# Patient Record
Sex: FEMALE | Race: WHITE | NOT HISPANIC OR LATINO | Employment: UNEMPLOYED | URBAN - NONMETROPOLITAN AREA
[De-identification: names, ages, dates, MRNs, and addresses within clinical notes are randomized per-mention and may not be internally consistent; named-entity substitution may affect disease eponyms.]

---

## 2018-01-09 NOTE — PROGRESS NOTES
NOV 10 2016         RE: Jo Beard                                 To: [facility]   MR#: [id number]                                   [address]   : [address] [address], [address] [place] [address]    ENC: [id number]                             Fax: [phone]   (Exam #: [id number])      The LMP of this 25year old,  G1, P0-0-0-0 patient was unknown, her   working MARII is DEC 7 2016 and the current gestational age is 42 weeks 1   day by [address] [place] [address]  A sonographic examination was performed on NOV   10 2016 using real time equipment  The ultrasound examination was   performed using abdominal technique  The patient has a BMI of 27 5  Her   blood pressure today was 135/78  MFM ultrasound 16   21w1d   16 MARII      Cardiac motion was observed at 134 bpm       INDICATIONS      asthma      Exam Types      Level I      RESULTS      Fetus # 1 of 1   Vertex presentation   Fetal growth appeared normal   Placenta Location = Posterior   No placenta previa   Placenta Grade = I      MEASUREMENTS (* Included In Average GA)      AC              33 8 cm        37 weeks 6 days* (83%)   BPD              8 7 cm        35 weeks 0 days* (33%)   HC              32 0 cm        35 weeks 4 days* (31%)   Femur            7 2 cm        36 weeks 1 day * (62%)      Cerebellum       4 7 cm        36 weeks 1 day   CisternaMagna    7 9 mm      HC/AC           0 95   FL/AC           0 21   FL/BPD          0 83   EFW (Ac/Fl/Hc)  3094 grams - 6 lbs 13 oz                 (65%)      THE AVERAGE GESTATIONAL AGE is 36 weeks 1 day +/- 21 days  AMNIOTIC FLUID      Q1: 2 8      Q2: 3 4      Q3: 4 9      Q4: 5 1   MAGGIE Total = 16 3 cm   Amniotic Fluid: Normal      ANATOMY DETAILS      Visualized Appearing Sonographically Normal:   HEAD: (Calvarium, BPD Level, Cavum, Lateral Ventricles, Cerebellum,   Cisterna Magna);    TH  CAV  : (Lungs, Diaphragm); HEART: (Four Chamber   View, Proximal Right Outflow, Cardiac Axis, Cardiac Position);    ABD    CAV : (Liver);    STOMACH, RIGHT KIDNEY, LEFT KIDNEY, BLADDER, PLACENTA      Not Visualized:   HEART: (Proximal Left Outflow)      IMPRESSION      Toussaint IUP   36 weeks and 1 day by this ultrasound  (MARII=DEC 7 2016)   36 weeks and 1 day by 1st Tri Sono  (MARII=DEC 7 2016)   Vertex presentation   Fetal growth appeared normal   Regular fetal heart rate of 134 bpm   Posterior placenta   No placenta previa      GENERAL COMMENT      On exam today the patient appears well, in no acute distress, and denies   any complaints  Her abdomen is non-tender  There has been appropriate interval fetal growth  Good fetal movement and   tone are seen  The amniotic fluid volume appears normal   The placenta is   posterior and it appears sonographically normal   The anatomic structures   listed above could not be optimally imaged today because of fetal   position; however, these structures were seen on a prior ultrasound and   appeared sonographically normal   The patient was informed of today's   findings and all of her questions were answered  The limitations of   ultrasound were reviewed with the patient   labor precautions as   well as fetal kick counts were reviewed  Recommend further ultrasounds as clinically indicated  Thank you very much for allowing us to participate in the care of this   very nice patient  Should you have any questions, please do not hesitate   to contact our office  HALI Mcdaniel M D     Electronically signed 11/10/16 14:08

## 2018-01-11 NOTE — PROGRESS NOTES
OCT 11 2016         RE: Preet Marin                                 To: [facility] [address]  [facility]   MR#: [id number]                                   [address]   : [address] [facility], [address] [place] [address]    ENC: [id number]                             Fax: [phone]   (Exam #: [id number])      The LMP of this 27year old,  G1, P0-0-0-0 patient was unknown, her   working MARII is DEC 7 2016 and the current gestational age is 34 weeks 6   days by [address] [place] [address]  A sonographic examination was performed on OCT   11 2016 using real time equipment  The ultrasound examination was   performed using abdominal technique  The patient has a BMI of 26 7  Her   blood pressure today was 105/70  MFM ultrasound 16   21w1d   16 MARII      Cardiac motion was observed at 139 bpm       INDICATIONS      asthma      Exam Types      Level I      RESULTS      Fetus # 1 of 1   Vertex presentation   Fetal growth appeared normal   Placenta Location = Posterior   No placenta previa   Placenta Grade = I      MEASUREMENTS (* Included In Average GA)      AC              28 1 cm        32 weeks 1 day * (52%)   BPD              7 7 cm        30 weeks 6 days* (21%)   HC              28 3 cm        30 weeks 5 days* (12%)   Femur            6 3 cm        32 weeks 3 days* (58%)      Cerebellum       3 7 cm        32 weeks 1 day   CisternaMagna    6 1 mm      HC/AC           1 01   FL/AC           0 22   FL/BPD          0 82   EFW (Ac/Fl/Hc)  1896 grams - 4 lbs 3 oz                 (46%)      THE AVERAGE GESTATIONAL AGE is 31 weeks 4 days +/- 18 days        AMNIOTIC FLUID      Q1: 3 4      Q2: 3 4      Q3: 2 4      Q4: 4 1   MAGGIE Total = 13 2 cm   Amniotic Fluid: Normal      ANATOMY DETAILS      Visualized Appearing Sonographically Normal:   HEAD: (Calvarium, BPD Level, Cavum, Lateral Ventricles, Choroid Plexus,   Cerebellum, Cisterna Magna);    TH  CAV : (Diaphragm); HEART: (Four   eXelate, Proximal Right Outflow, 3 Vessel Trachea, Short Axis of   Greater Vessels, Ductal Arch, Aortic Arch, Interventricular Septum,   Interatrial Septum, Cardiac Axis, Cardiac Position);    ABD  CAV ,   STOMACH, RIGHT KIDNEY, LEFT KIDNEY, BLADDER, PLACENTA      Not Visualized:   HEART: (Proximal Left Outflow)      BIOPHYSICAL PROFILE      The Biophysical Profile score was 8/8  Breathin  Movement: 2  Tone: 2  AFV: 2      IMPRESSION      Toussaint IUP   31 weeks and 4 days by this ultrasound  (MARII=DEC 9 2016)   31 weeks and 6 days by 1st Tri Sono  (MARII=DEC 7 2016)   Vertex presentation   Fetal growth appeared normal   Regular fetal heart rate of 139 bpm   Posterior placenta   No placenta previa      GENERAL COMMENT      Office Visit      On exam today the patient appears well, in no acute distress, and denies   any complaints other than occasional asthma exacerbation  Her abdomen is   non-tender  There has been appropriate interval fetal growth  Good fetal movement and   tone are seen  The amniotic fluid volume appears normal   The placenta is   posterior and it appears sonographically normal   The anatomic structures   listed above could not be optimally imaged today because of fetal   position; however, these structures were seen on a prior ultrasound and   appeared sonographically normal   The patient was informed of today's   findings and all of her questions were answered  The limitations of   ultrasound were reviewed with the patient   labor precautions and   fetal kick counts were reviewed  Provide the patient with a new requisition for a Ventolin inhaler given   that she stated she has not had one but feels the need to utilize it   recently for some wheezing  I provided her instructions upon its use and   asked her to call her family doctor if she feels like she needs to use it   more than twice a week        Recommend the patient return as clinically indicated  Thank you very much for allowing us to participate in the care of this   very nice patient  Should you have any questions, please do not hesitate   to contact our office  Please note, in addition to the time spent discussing the results of the   ultrasound, I spent approximately 10 minutes of face-to-face time with the   patient, greater than 50% of which was spent in counseling and the   coordination of care for this patient  HALI Angela M D     Electronically signed 10/11/16 13:55

## 2018-01-12 NOTE — MISCELLANEOUS
Message  Return to work or school:   [facility]  She was seen in my office on 11/16/2016       Due to high risk with patient blood pressure she is to sit and takes breaks frequently  Signatures   Electronically signed by :  Carolyn Villarreal, ; Nov 16 2016  1:51PM EST                       (Author)

## 2018-01-13 NOTE — RESULT NOTES
Verified Results  (1) URINE CULTURE [id number] 12:00AM Tobytonja Solano     Test Name Result Flag Reference   CULTURE, URINE, ROUTINE  A    CULTURE, URINE, ROUTINE         MICRO NUMBER:      [id number]    TEST STATUS:       FINAL    SPECIMEN SOURCE:   URINE    SPECIMEN QUALITY:  ADEQUATE    RESULT:            Greater than 100,000 CFU/mL of Escherichia coli                            E coli                            ----------------                            INT   MICHELLE     AMOX/CLAVULANATE       R     >=32 0     AMPICILLIN             R     >=32 0     AMP/SULBACTAM          R     >=32 0     CEFAZOLIN              R     >=64 0 **1     CEFEPIME               S     <=1 0     CEFTRIAXONE            S     <=1 0     CIPROFLOXACIN          S     <=0 25     ERTAPENEM              S     <=0 5     GENTAMICIN             S     <=1 0     IMIPENEM               S     <=0 25     LEVOFLOXACIN           S     <=0 12     NITROFURANTOIN         S     <=16 0     PIP/TAZOBACTAM         S     8 0     TOBRAMYCIN             S     <=1 0     TRIMETHOPRIM/SULFA     S     <=20 0  S=Susceptible  I=Intermediate  R=Resistant  * = Not Tested  NR = Not Reported  **NN = See Therapy Comments  THERAPY COMMENTS      Note 1:      ORAL therapy: A cefazolin MICHELLE of < 32 predicts      susceptibility to the oral agents cefaclor,      cefdinir, cefpodoxime, cefprozil, cefuroxime,      cephalexin, and loracarbef when used for therapy      of uncomplicated UTIs due to E  coli,      K  pneumoniae, and P  mirabilis  PARENTERAL therapy: A cefazolin MICHELLE of > 8      indicates resistance to parenteral cefazolin  An alternate test method must be performed to      to confirm susceptibility to parenteral cefazolin

## 2018-01-14 NOTE — MISCELLANEOUS
Reason For Visit  Reason For Visit Free Text Note Form: Received request to contact pt  regarding elevated depression score  Call to pt  who reports she is feeling more anxious than depressed  She denies SI at this time  Pt  reports she is interested in outpatient counseling however her Medical Assistance is pending and therefore she is self-pay for services at this time  / [facility]  office as option for free mental health services and she is agreeable to consider  Supportive counseling provided to pt  Letter mailed to pt  today including contact information for [facility] as well as contact information for  at [facility]  Pt  denies further needs at this time  Will continue to be available to provide support  Active Problems    1  Anxiety (300 00) (F41 9)   2  Normal pregnancy (V22 2) (Z34 90)   3  Screen for STD (sexually transmitted disease) (V74 5) (Z11 3)   4  UTI in pregnancy (646 60,599 0) (O23 40)    Current Meds   1  Prenatal Vitamins 0 8 MG Oral Tablet; TAKE 1 TABLET DAILY; Therapy: [id number] to (Evaluate:93Eqj0883)  Requested for: 92QFG8682; Last   Rx:[id number] Ordered    Allergies    1  No Known Drug Allergies    2   Seasonal    Signatures   Electronically signed by : BEBE Betancourt; Jul 28 2016 11:00AM EST                       (Author)

## 2018-01-16 NOTE — PROGRESS NOTES
2016         RE: Sukumar Rebolledo                                 To: FLASH MATTA [facility]   MR#: [id number]                                   [address] [place] [address]   : [place] [facility]  ENC: [id number]                             Debbie [address]   (Exam #: [id number])                           Fax: [phone]      The LMP of this 27year old,  G1, P0-0-0-0 patient was unknown, her   working MARII is DEC 7 2016 and the current gestational age is 22 weeks 1   day by [address] [place] [address]  A sonographic examination was performed on 2016 using real time equipment  The ultrasound examination was   performed using abdominal & vaginal techniques  The patient has a BMI of   24 6  Her blood pressure today was 112/61  MFM ultrasound 16   21w1d   16 MARII Thank you very much for   referring this very nice patient for a  consultation and fetal   anatomic survey  This is the patient's first pregnancy  She has a   history of asthma for which she takes albuterol although she no longer has   this medication in her possession  She has a history of anxiety not   requiring any medications  She denies the current use of tobacco,   alcohol, or drugs  Her medications include prenatal vitamins and   albuterol when necessary  The patient's family medical history is   noncontributory  The father the baby states that he has a brother and   sister both of whom have developmental delay although they do not know if   it is geneticly related  A review of systems is otherwise negative  On   exam, the patient appears well, in no acute distress, and her abdomen is   nontender  The patient's lung exam shows that there is no wheezing or   rhonchi appreciated  Her lungs are clear to auscultation bilaterally        Cardiac motion was observed at 148 bpm       INDICATIONS      fetal anatomical survey      Exam Types      LEVEL II   Transvaginal      RESULTS      Fetus # 1 of 1   Vertex presentation   Fetal growth appeared normal   Placenta Location = Posterior   No placenta previa   Placenta Grade = I      MEASUREMENTS (* Included In Average GA)      AC              16 2 cm        21 weeks 0 days* (50%)   BPD              4 9 cm        20 weeks 5 days* (40%)   HC              18 3 cm        20 weeks 4 days* (37%)   Femur            3 8 cm        22 weeks 2 days* (71%)      Nuchal Fold      3 0 mm      Humerus          3 3 cm        21 weeks 1 day   (48%)   Radius           2 6 cm        20 weeks 6 days   Ulna             2 9 cm        20 weeks 6 days   Tibia            2 8 cm        20 weeks 0 days  (13%)   Fibula           2 9 cm        19 weeks 5 days   Foot             3 3 cm        20 weeks 1 day      Cerebellum       2 3 cm        21 weeks 6 days   Biorbit          3 2 cm        20 weeks 4 days   CisternaMagna    8 3 mm      HC/AC           1 13   FL/AC           0 23   FL/BPD          0 78   EFW (Ac/Fl/Hc)   425 grams - 0 lbs 15 oz      THE AVERAGE GESTATIONAL AGE is 21 weeks 1 day +/- 10 days  AMNIOTIC FLUID         Largest Vertical Pocket = 5 8 cm   Amniotic Fluid: Normal      UTERINE ARTERIES                                  S/D   PI    RI    NOTCH       Left Uterine Artery        1 46  0 40  0 31       Right Uterine Artery       2 12  0 84  0 53      CERVICAL EVALUATION      The cervix appeared normal (Ultrasound Examination)  SUPINE      Cervical Length: 4 30 cm      OTHER TEST RESULTS           Funneling?: No             Dynamic Changes?: No        Resp  To TFP?: No      ANATOMY      Head                                    Normal   Face/Neck                               Normal   Th  Cav  Normal   Heart                                   Normal   Abd  Cav                                 Normal   Stomach                                 Normal   Right Kidney Normal   Left Kidney                             Normal   Bladder                                 Normal   Abd  Wall                               Normal   Spine                                   Normal   Extrems                                 Normal   Genitalia                               Normal   Placenta                                Normal   Umbl  Cord                              Normal   Uterus                                  Normal   PCI                                     Normal      ANATOMY DETAILS      Visualized Appearing Sonographically Normal:   HEAD: (Calvarium, BPD Level, Cavum, Lateral Ventricles, Choroid Plexus,   Cerebellum, Cisterna Magna);    FACE/NECK: (Neck, Nuchal Fold, Profile,   Orbits, Nose/Lips, Palate, Face);    TH  CAV : (Diaphragm); HEART:   (Four Chamber View, Proximal Left Outflow, Proximal Right Outflow, 3   Vessel Trachea, Short Axis of Greater Vessels, Ductal Arch, Aortic Arch,   Interventricular Septum, Interatrial Septum, Cardiac Axis, Cardiac   Position);    ABD  CAV , STOMACH, RIGHT KIDNEY, LEFT KIDNEY, BLADDER, ABD  WALL, SPINE: (Cervical Spine, Thoracic Spine, Lumbar Spine, Sacrum);      EXTREMS: (Lt Humerus, Rt Humerus, Lt Forearm, Rt Forearm, Lt Hand, Rt   Hand, Lt Femur, Rt Femur, Lt Low Leg, Rt Low Leg, Lt Foot, Rt Foot);      GENITALIA (Female), PLACENTA, UMBL  CORD, UTERUS, PCI      ADNEXA      The left ovary appeared normal and measured 2 2 x 1 5 x 1 3 cm with a   volume of 2 2 cc  The right ovary appeared normal and measured 2 5 x 1 9 x   1 6 cm with a volume of 4 0 cc        IMPRESSION      Toussaint IUP   21 weeks and 1 day by this ultrasound  (MARII=DEC 7 2016)   21 weeks and 1 day by [facility] Tri Sono  (MARII=DEC 7 2016)   Vertex presentation   Fetal growth appeared normal   Normal anatomy survey   Regular fetal heart rate of 148 bpm   Posterior placenta   No placenta previa      GENERAL COMMENT      The patient has declined genetic screening, and we discussed that a normal   ultrasound does not exclude all congenital birth defects or karyotypic   abnormalities  The fetal anatomic survey is complete  There is no sonographic evidence   of fetal abnormalities at this time  Good fetal movement and tone are   seen  The amniotic fluid volume appears normal   The placenta is   posterior and it appears sonographically normal  No notching of either   uterine artery waveform is appreciated and resistive indices are normal     A transvaginal ultrasound was performed to assess the cervix, which was   not seen well transabdominally  The cervical length was 4 3 centimeters,   which is normal for the current gestational age  There was no significant   funneling or dynamic changes appreciated  The patient was informed of   today's findings and all of her questions were answered  The limitations   of ultrasound were reviewed with the patient, which she accepts  We discussed that asthma puts her at increased risk for adverse pregnancy   outcome and adverse maternal outcome secondary to the physiologic changes   of pregnancy and that is very important to control asthma well during   pregnancy as it is outside of pregnancy  In general, asthma is managed   similarly during pregnancy as it is outside of pregnancy with a rescue   inhaler (beta 2 agonist) as needed and inhaled corticosteroids or inhaled   long-acting beta-2 agonists for severe persistent asthma  I recommend   monitoring her asthma through the use of a peak flow monitor to obtain   objective evidence of adequate control  I provided the patient with a   prescription for albuterol and instructions on its use    She was   instructed that if she requires its use more than twice a week, she should   see her primary care doctor or return for evaluation for an inhaled   corticosteroids      Please note, in addition to the time spent discussing the results of the   ultrasound, I spent approximately 15 minutes of face-to-face time with the   patient, greater than 50% of which was spent in counseling and the   coordination of care for this patient  Thank you very much for allowing us to participate in the care of this   very nice patient  Should you have any questions, please do not hesitate   to contact our office  HALI Cortes , SKY Edwards     Electronically signed 07/28/16 16:21

## 2018-01-18 NOTE — RESULT NOTES
Verified Results  (1) PRENATAL PANEL [id number] 11:19AM Kirstie Backers Order Number: [id number]     Test Name Result Flag Reference   HIV 1/2 AND P24 Non-Reactive  Non-Reactive   This test detects HIV 1, HIV2 and p24 Antigen     BILIRUBIN UA Negative  Negative   CLARITY Hazy     COLOR Yellow     KETONES UA Negative mg/dl  Negative   LEUKOCYTE ESTERASE UA Small A Negative   NITRITE UA Positive A Negative   BLOOD UA Negative  Negative, Trace-Intact   PH UA 6 5  4 5-8 0   PROTEIN UA Negative mg/dl  Negative   SPECIFIC GRAVITY UA 1 025  1 003-1 030   GLUCOSE UA Negative mg/dl  Negative   UROBILINOGEN UA 0 2 E U /dl  0 2, 1 0 E U /dl   HEPATITIS B SURFACE ANTIGEN Non-reactive  Non-reactive, NonReactive - Confirmed   BASOPHILS ABSOLUTE COUNT 0 01 Thousands/?L  0 00-0 10   EOSINOPHILS ABSOLUTE COUNT 0 07 Thousand/?L  0 00-0 61   LYMPHOCYTES ABSOLUTE COUNT 1 61 Thousands/?L  0 60-4 47   MONOCYTES ABSOLUTE COUNT 0 49 Thousand/?L  0 17-1 22   NEUTROPHILS ABSOLUTE COUNT 4 89 Thousands/?L  1 85-7 62   BASOPHILS RELATIVE PERCENT 0 %  0-1   EOSINOPHILS RELATIVE PERCENT 1 %  0-6   HEMATOCRIT 32 5 % L 34 8-46 1   HEMOGLOBIN 11 0 g/dL L 11 5-15 4   LYMPHOCYTES RELATIVE PERCENT 23 %  14-44   MCH 29 6 pg  26 8-34 3   MCHC 33 8 g/dL  31 4-37 4   MCV 87 fL  82-98   MONOCYTES RELATIVE PERCENT 7 %  4-12   nRBC AUTOMATED 0 /100 WBCs     PLATELET COUNT 670 Thousands/uL  149-390   MPV 10 0 fL  8 9-12 7   RBC COUNT 3 72 Million/uL L 3 81-5 12   RDW 13 5 %  11 6-15 1   NEUTROPHILS RELATIVE PERCENT 69 %  43-75   WBC COUNT 7 07 Thousand/uL  4 31-10 16   RUBELLA (IGG) 83 6 IU/mL  >9 9   Rubella IgG       <5 0 IU/mL     Negative: not immune      5 0-9 9 IU/mL  Equivocal     >9 9 IU/mL     Positive: immune   ABO GROUPING O     RH FACTOR Positive     ANTIBODY SCREEN Negative     RPR Non-Reactive  Non-Reactive   CLINICAL REPORT (Report)     Test:        Urine culture  Specimen Type:   Urine  Specimen Date:   7/15/2016 11:19 AM  Result Date:    7/17/2016 9:15 AM  Result Status:   Final result  Resulting Lab:   [facility] [address] [address]            Tel: [phone]      CULTURE                                       ------------------                                   >100,000 cfu/ml Escherichia coli      SUSCEPTIBILITY                                   ------------------                                                       Escherichia coli  METHOD                 MICHELLE  -------------------------------------  -------------------------  AMPICILLIN ($$)             <=8 00 ug/ml Susceptible  AZTREONAM ($$$)             <=8 ug/ml   Susceptible  CEFAZOLIN ($)              <=8 00 ug/ml Susceptible  CIPROFLOXACIN ($)            <=1 00 ug/ml Susceptible  GENTAMICIN ($$)             <=4 ug/ml   Susceptible  LEVOFLOXACIN ($)            <=2 00 ug/ml Susceptible  NITROFURANTOIN             <=32 ug/ml  Susceptible  PIPERACILLIN + TAZOBACTAM ($$$)     <=16 ug/ml  Susceptible  TETRACYCLINE              <=4 ug/ml   Susceptible  TOBRAMYCIN ($)             <=4 ug/ml   Susceptible  TRIMETHOPRIM + SULFAMETHOXAZOLE ($$$)  <=2/38 ug/ml Susceptible

## 2019-01-20 ENCOUNTER — HOSPITAL ENCOUNTER (EMERGENCY)
Facility: HOSPITAL | Age: 24
Discharge: HOME | End: 2019-01-20
Attending: EMERGENCY MEDICINE

## 2019-01-20 VITALS
RESPIRATION RATE: 21 BRPM | HEART RATE: 93 BPM | DIASTOLIC BLOOD PRESSURE: 67 MMHG | TEMPERATURE: 98.5 F | WEIGHT: 141.98 LBS | OXYGEN SATURATION: 97 % | SYSTOLIC BLOOD PRESSURE: 117 MMHG | BODY MASS INDEX: 25.97 KG/M2

## 2019-01-20 DIAGNOSIS — Z34.91 FIRST TRIMESTER PREGNANCY: ICD-10-CM

## 2019-01-20 DIAGNOSIS — O23.41 UTI (URINARY TRACT INFECTION) DURING PREGNANCY, FIRST TRIMESTER: ICD-10-CM

## 2019-01-20 DIAGNOSIS — R42 ORTHOSTATIC LIGHTHEADEDNESS: ICD-10-CM

## 2019-01-20 DIAGNOSIS — R55 SYNCOPE: Primary | ICD-10-CM

## 2019-01-20 DIAGNOSIS — E87.6 ACUTE HYPOKALEMIA: ICD-10-CM

## 2019-01-20 DIAGNOSIS — F12.10 CANNABIS ABUSE: ICD-10-CM

## 2019-01-20 LAB
AMPHETAMINES SERPL QL SCN: NEGATIVE
ANION GAP SERPL CALCULATED.3IONS-SCNC: 10 MMOL/L (ref 4–13)
BACTERIA UR QL AUTO: ABNORMAL /HPF
BARBITURATES UR QL: NEGATIVE
BASOPHILS # BLD AUTO: 0.01 THOUSANDS/ΜL (ref 0–0.1)
BASOPHILS NFR BLD AUTO: 0 % (ref 0–1)
BENZODIAZ UR QL: NEGATIVE
BILIRUB UR QL STRIP: NEGATIVE
BUN SERPL-MCNC: 7 MG/DL (ref 5–25)
CALCIUM SERPL-MCNC: 8.5 MG/DL (ref 8.3–10.1)
CHLORIDE SERPL-SCNC: 104 MMOL/L (ref 100–108)
CLARITY UR: CLEAR
CO2 SERPL-SCNC: 25 MMOL/L (ref 21–32)
COCAINE UR QL: NEGATIVE
COLOR UR: YELLOW
CREAT SERPL-MCNC: 0.7 MG/DL (ref 0.6–1.3)
EOSINOPHIL # BLD AUTO: 0.07 THOUSAND/ΜL (ref 0–0.61)
EOSINOPHIL NFR BLD AUTO: 1 % (ref 0–6)
ERYTHROCYTE [DISTWIDTH] IN BLOOD BY AUTOMATED COUNT: 13.7 % (ref 11.6–15.1)
EXT PREG TEST URINE: POSITIVE
GFR SERPL CREATININE-BSD FRML MDRD: 124 ML/MIN/1.73SQ M
GLUCOSE SERPL-MCNC: 124 MG/DL (ref 65–140)
GLUCOSE UR STRIP-MCNC: NEGATIVE MG/DL
HCT VFR BLD AUTO: 34.8 % (ref 34.8–46.1)
HGB BLD-MCNC: 11.4 G/DL (ref 11.5–15.4)
HGB UR QL STRIP.AUTO: NEGATIVE
IMM GRANULOCYTES # BLD AUTO: 0.03 THOUSAND/UL (ref 0–0.2)
IMM GRANULOCYTES NFR BLD AUTO: 0 % (ref 0–2)
KETONES UR STRIP-MCNC: NEGATIVE MG/DL
LEUKOCYTE ESTERASE UR QL STRIP: ABNORMAL
LYMPHOCYTES # BLD AUTO: 1.94 THOUSANDS/ΜL (ref 0.6–4.47)
LYMPHOCYTES NFR BLD AUTO: 25 % (ref 14–44)
MCH RBC QN AUTO: 28.7 PG (ref 26.8–34.3)
MCHC RBC AUTO-ENTMCNC: 32.8 G/DL (ref 31.4–37.4)
MCV RBC AUTO: 88 FL (ref 82–98)
METHADONE UR QL: NEGATIVE
MONOCYTES # BLD AUTO: 0.5 THOUSAND/ΜL (ref 0.17–1.22)
MONOCYTES NFR BLD AUTO: 7 % (ref 4–12)
MUCOUS THREADS UR QL AUTO: ABNORMAL
NEUTROPHILS # BLD AUTO: 5.11 THOUSANDS/ΜL (ref 1.85–7.62)
NEUTS SEG NFR BLD AUTO: 67 % (ref 43–75)
NITRITE UR QL STRIP: NEGATIVE
NON-SQ EPI CELLS URNS QL MICRO: ABNORMAL /HPF
NRBC BLD AUTO-RTO: 0 /100 WBCS
OPIATES UR QL SCN: NEGATIVE
PCP UR QL: NEGATIVE
PH UR STRIP.AUTO: 6 [PH] (ref 4.5–8)
PLATELET # BLD AUTO: 253 THOUSANDS/UL (ref 149–390)
PMV BLD AUTO: 10.5 FL (ref 8.9–12.7)
POTASSIUM SERPL-SCNC: 3.3 MMOL/L (ref 3.5–5.3)
PROT UR STRIP-MCNC: NEGATIVE MG/DL
RBC # BLD AUTO: 3.97 MILLION/UL (ref 3.81–5.12)
RBC #/AREA URNS AUTO: ABNORMAL /HPF
SODIUM SERPL-SCNC: 139 MMOL/L (ref 136–145)
SP GR UR STRIP.AUTO: 1.02 (ref 1–1.03)
THC UR QL: POSITIVE
UROBILINOGEN UR QL STRIP.AUTO: 0.2 E.U./DL
WBC # BLD AUTO: 7.66 THOUSAND/UL (ref 4.31–10.16)
WBC #/AREA URNS AUTO: ABNORMAL /HPF

## 2019-01-20 PROCEDURE — 96360 HYDRATION IV INFUSION INIT: CPT

## 2019-01-20 PROCEDURE — 80307 DRUG TEST PRSMV CHEM ANLYZR: CPT | Performed by: EMERGENCY MEDICINE

## 2019-01-20 PROCEDURE — 93005 ELECTROCARDIOGRAM TRACING: CPT

## 2019-01-20 PROCEDURE — 85025 COMPLETE CBC W/AUTO DIFF WBC: CPT | Performed by: EMERGENCY MEDICINE

## 2019-01-20 PROCEDURE — 36415 COLL VENOUS BLD VENIPUNCTURE: CPT | Performed by: EMERGENCY MEDICINE

## 2019-01-20 PROCEDURE — 81001 URINALYSIS AUTO W/SCOPE: CPT | Performed by: EMERGENCY MEDICINE

## 2019-01-20 PROCEDURE — 87086 URINE CULTURE/COLONY COUNT: CPT | Performed by: EMERGENCY MEDICINE

## 2019-01-20 PROCEDURE — 81025 URINE PREGNANCY TEST: CPT | Performed by: EMERGENCY MEDICINE

## 2019-01-20 PROCEDURE — 80048 BASIC METABOLIC PNL TOTAL CA: CPT | Performed by: EMERGENCY MEDICINE

## 2019-01-20 PROCEDURE — 99284 EMERGENCY DEPT VISIT MOD MDM: CPT

## 2019-01-20 RX ORDER — POTASSIUM CHLORIDE 20 MEQ/1
20 TABLET, EXTENDED RELEASE ORAL ONCE
Status: COMPLETED | OUTPATIENT
Start: 2019-01-20 | End: 2019-01-20

## 2019-01-20 RX ORDER — NITROFURANTOIN 25; 75 MG/1; MG/1
100 CAPSULE ORAL 2 TIMES DAILY
Qty: 10 CAPSULE | Refills: 0 | Status: SHIPPED | OUTPATIENT
Start: 2019-01-20 | End: 2019-08-20

## 2019-01-20 RX ADMIN — SODIUM CHLORIDE 1000 ML: 0.9 INJECTION, SOLUTION INTRAVENOUS at 05:32

## 2019-01-20 RX ADMIN — POTASSIUM CHLORIDE 20 MEQ: 1500 TABLET, EXTENDED RELEASE ORAL at 06:05

## 2019-01-20 NOTE — ED PROCEDURE NOTE
PROCEDURE  Pelvic Ultrasound  Performed by: Maria C Luna  Authorized by: Maria C Luna     Procedure date/time:  1/20/2019 6:22 AM  Patient location:  ED  Procedure details:     Indications: evaluate for IUP      Assess:  Intrauterine pregnancy and fetal viability    Technique:  Transabdominal obstetric (limited) exam  Uterine findings:     Intrauterine pregnancy: identified      Single gestation: identified      Gestational sac: identified      Fetal heart rate: identified      Fetal heart rate (bpm):  181  Other findings:     Free pelvic fluid: not identified      Free peritoneal fluid: not identified               Abebe Lynn DO  01/20/19 0495

## 2019-01-20 NOTE — ED PROCEDURE NOTE
PROCEDURE  ECG 12 Lead Documentation  Date/Time: 1/20/2019 5:35 AM  Performed by: Otoniel Luque by: Jeremi Cotto     Indications / Diagnosis:  Syncope  ECG reviewed by me, the ED Provider: yes    Patient location:  ED  Interpretation:     Interpretation: non-specific    Rate:     ECG rate:  104    ECG rate assessment: tachycardic    Rhythm:     Rhythm: sinus tachycardia    Ectopy:     Ectopy: none    Conduction:     Conduction: abnormal      Abnormal conduction: incomplete RBBB    ST segments:     ST segments:  Normal  T waves:     T waves: normal           Fadia Bustillos DO  01/20/19 8289

## 2019-01-20 NOTE — ED PROVIDER NOTES
History  Chief Complaint   Patient presents with    Dizziness     Pt who reports to have had positive home pregnancy test ~ 2 months ago c/o dizziness and having 2 syncopal episodes this AM     Patient presents via EMS for complaint of syncope preceded by dizziness while she was at a convenience store  She was in the presence of her boyfriend who helped her to the ground without traumatic injury  Patient thinks she may be pregnant based on a home pregnancy test 2 months ago  She has not seen OB for this pregnancy  This is her 2nd pregnancy  She continues to feel dizzy that she describes as room spinning and a blackening of her vision while seated here in the ED  She does not appear in any discomfort  She reports daily nausea with vomiting for the past 2 weeks, 1-3 times each morning  She has otherwise been eating and drinking well  She has had no infectious symptoms recently including cough or cold, dysuria or fever  She denies any vaginal bleeding or discharge and except for a brief episode of left lower quadrant crampiness yesterday, she has had no abdominal pain  She denies any other associated cardiac symptoms  She reports a h/o sinus bradycardia but was noted to be mildly tachycardic at the convenience store  She is normocardic in the ED  Denies preceding f/c, HA, diaphoresis, CP, SOB, abdominal pain, diarrhea or dysuria  12 system ROS o/w negative  History provided by:  Patient, medical records, parent and EMS personnel  Dizziness   Quality:  Room spinning and lightheadedness  Severity:  Moderate  Onset quality:  Sudden  Duration:  1 hour  Timing:  Constant  Progression:  Waxing and waning  Chronicity:  New  Context: loss of consciousness, physical activity and standing up    Relieved by: Lying down    Worsened by:  Standing up and sitting upright  Associated symptoms: nausea, syncope and vomiting    Associated symptoms: no blood in stool, no chest pain, no diarrhea, no headaches, no hearing loss, no palpitations, no shortness of breath, no tinnitus, no vision changes and no weakness    Nausea:     Severity:  Moderate    Onset quality:  Sudden    Duration:  2 weeks    Timing:  Sporadic    Progression:  Unchanged  Syncope:     Duration:  5 seconds    Witnessed: yes      Suspicion of head trauma:  No  Vomiting:     Quality:  Stomach contents    Number of occurrences:  1-3 x daily     Severity:  Moderate    Duration:  2 weeks    Timing:  Sporadic    Progression:  Unchanged  Risk factors: no anemia, no heart disease, no hx of stroke, no hx of vertigo, no Meniere's disease, no multiple medications and no new medications        Prior to Admission Medications   Prescriptions Last Dose Informant Patient Reported? Taking? Prenatal Multivit-Min-Fe-FA (PRENATAL VITAMINS PO)   Yes No   Sig: Take 1 tablet by mouth daily   acetaminophen (TYLENOL) 325 mg tablet   No No   Sig: Take one tablet every 4-6 hours as needed   albuterol (VENTOLIN HFA) 90 mcg/act inhaler   Yes No   Sig: Inhale 2 puffs as needed   ibuprofen (MOTRIN) 600 mg tablet   No No   Sig: Take 1 tablet by mouth every 6 (six) hours as needed for mild pain for up to 30 days      Facility-Administered Medications: None       Past Medical History:   Diagnosis Date    Anemia     Asthma        History reviewed  No pertinent surgical history  History reviewed  No pertinent family history  I have reviewed and agree with the history as documented  Social History   Substance Use Topics    Smoking status: Never Smoker    Smokeless tobacco: Never Used    Alcohol use No        Review of Systems   Constitutional: Negative for chills, diaphoresis and fever  HENT: Negative for hearing loss, rhinorrhea, sore throat, tinnitus and trouble swallowing  Respiratory: Negative for cough, chest tightness, shortness of breath and wheezing  Cardiovascular: Positive for syncope  Negative for chest pain, palpitations and leg swelling  Gastrointestinal: Positive for nausea and vomiting  Negative for abdominal distention, abdominal pain, blood in stool, constipation and diarrhea  Genitourinary: Negative for difficulty urinating, dysuria, flank pain, frequency, hematuria, urgency, vaginal bleeding and vaginal discharge  Musculoskeletal: Negative for arthralgias, back pain, myalgias, neck pain and neck stiffness  Skin: Negative for pallor and rash  Neurological: Positive for dizziness and syncope  Negative for weakness, light-headedness and headaches  Hematological: Negative for adenopathy  Psychiatric/Behavioral: Negative for confusion  The patient is not nervous/anxious  All other systems reviewed and are negative  Physical Exam  Physical Exam   Constitutional: She is oriented to person, place, and time  She appears well-developed and well-nourished  No distress  HENT:   Head: Normocephalic and atraumatic  Right Ear: External ear normal    Left Ear: External ear normal    Mouth/Throat: Oropharynx is clear and moist    Eyes: Pupils are equal, round, and reactive to light  Conjunctivae and EOM are normal    No nystagmus   Neck: Normal range of motion  Neck supple  No carotid bruits   Cardiovascular: Normal rate, regular rhythm and intact distal pulses  No murmur heard  Pulmonary/Chest: Effort normal and breath sounds normal  No respiratory distress  She has no wheezes  She has no rales  Abdominal: Soft  Bowel sounds are normal  She exhibits no distension  There is no tenderness  Musculoskeletal: Normal range of motion  She exhibits no edema or tenderness  Lymphadenopathy:     She has no cervical adenopathy  Neurological: She is alert and oriented to person, place, and time  She has normal reflexes  No cranial nerve deficit  She exhibits normal muscle tone  Skin: Skin is warm and dry  Capillary refill takes less than 2 seconds  No rash noted  She is not diaphoretic  No erythema  No pallor     Psychiatric: She has a normal mood and affect  Her behavior is normal  Thought content normal    Vitals reviewed  Vital Signs  ED Triage Vitals   Temperature Pulse Respirations Blood Pressure SpO2   01/20/19 0600 01/20/19 0523 01/20/19 0523 01/20/19 0523 01/20/19 0523   98 5 °F (36 9 °C) 96 18 135/72 100 %      Temp Source Heart Rate Source Patient Position - Orthostatic VS BP Location FiO2 (%)   01/20/19 0523 01/20/19 0523 01/20/19 0523 01/20/19 0523 --   Temporal Monitor Lying Left arm       Pain Score       01/20/19 0523       No Pain           Vitals:    01/20/19 0523 01/20/19 0600 01/20/19 0630   BP: 135/72 116/67 125/66   Pulse: 96 88 92   Patient Position - Orthostatic VS: Lying         Visual Acuity      ED Medications  Medications   sodium chloride 0 9 % bolus 1,000 mL (0 mL Intravenous Stopped 1/20/19 0646)   potassium chloride (K-DUR,KLOR-CON) CR tablet 20 mEq (20 mEq Oral Given 1/20/19 2115)       Diagnostic Studies  Results Reviewed     Procedure Component Value Units Date/Time    Rapid drug screen, urine [[id number]]  (Abnormal) Collected:  01/20/19 0628    Lab Status:  Final result Specimen:  Urine from Urine, Clean Catch Updated:  01/20/19 0649     Amph/Meth UR Negative     Barbiturate Ur Negative     Benzodiazepine Urine Negative     Cocaine Urine Negative     Methadone Urine Negative     Opiate Urine Negative     PCP Ur Negative     THC Urine Positive (A)    Narrative:         Presumptive report  If requested, specimen will be sent to reference lab for confirmation  FOR MEDICAL PURPOSES ONLY  IF CONFIRMATION NEEDED PLEASE CONTACT THE LAB WITHIN 5 DAYS      Drug Screen Cutoff Levels:  AMPHETAMINE/METHAMPHETAMINES  1000 ng/mL  BARBITURATES     200 ng/mL  BENZODIAZEPINES     200 ng/mL  COCAINE      300 ng/mL  METHADONE      300 ng/mL  OPIATES      300 ng/mL  PHENCYCLIDINE     25 ng/mL  THC       50 ng/mL    Urine Microscopic [[id number]]  (Abnormal) Collected:  01/20/19 0627    Lab Status:  Final result Specimen: Urine from Urine, Clean Catch Updated:  01/20/19 0648     RBC, UA None Seen /hpf      WBC, UA 2-4 (A) /hpf      Epithelial Cells Occasional /hpf      Bacteria, UA Moderate (A) /hpf      MUCUS THREADS Occasional (A)     URINE COMMENT --    POCT pregnancy, urine [[id number]]  (Abnormal) Resulted:  01/20/19 0647    Lab Status:  Final result Updated:  01/20/19 0647     EXT PREG TEST UR (Ref: Negative) positive    UA w Reflex to Microscopic w Reflex to Culture [[id number]]  (Abnormal) Collected:  01/20/19 0627    Lab Status:  Final result Specimen:  Urine from Urine, Clean Catch Updated:  01/20/19 0635     Color, UA Yellow     Clarity, UA Clear     Specific Gravity, UA 1 025     pH, UA 6 0     Leukocytes, UA Trace (A)     Nitrite, UA Negative     Protein, UA Negative mg/dl      Glucose, UA Negative mg/dl      Ketones, UA Negative mg/dl      Urobilinogen, UA 0 2 E U /dl      Bilirubin, UA Negative     Blood, UA Negative     URINE COMMENT --    Urine culture [[id number]] Collected:  01/20/19 2847    Lab Status: In process Specimen:  Urine from Urine, Clean Catch Updated:  01/20/19 8237    Basic metabolic panel [[id number]]  (Abnormal) Collected:  01/20/19 0531    Lab Status:  Final result Specimen:  Blood from Arm, Left Updated:  01/20/19 0555     Sodium 139 mmol/L      Potassium 3 3 (L) mmol/L      Chloride 104 mmol/L      CO2 25 mmol/L      ANION GAP 10 mmol/L      BUN 7 mg/dL      Creatinine 0 70 mg/dL      Glucose 124 mg/dL      Calcium 8 5 mg/dL      eGFR 124 ml/min/1 73sq m     Narrative:         National Kid[employer] Disease Education Program recommendations are as follows:  GFR calculation is accurate only with a steady state creatinine  Chronic Kidney disease less than 60 ml/min/1 73 sq  meters  Kidney failure less than 15 ml/min/1 73 sq  meters      CBC and differential [[id number]]  (Abnormal) Collected:  01/20/19 0531    Lab Status:  Final result Specimen:  Blood from Arm, Left Updated:  01/20/19 0547     WBC 7 66 Thousand/uL      RBC 3 97 Million/uL      Hemoglobin 11 4 (L) g/dL      Hematocrit 34 8 %      MCV 88 fL      MCH 28 7 pg      MCHC 32 8 g/dL      RDW 13 7 %      MPV 10 5 fL      Platelets 599 Thousands/uL      nRBC 0 /100 WBCs      Neutrophils Relative 67 %      Immat GRANS % 0 %      Lymphocytes Relative 25 %      Monocytes Relative 7 %      Eosinophils Relative 1 %      Basophils Relative 0 %      Neutrophils Absolute 5 11 Thousands/µL      Immature Grans Absolute 0 03 Thousand/uL      Lymphocytes Absolute 1 94 Thousands/µL      Monocytes Absolute 0 50 Thousand/µL      Eosinophils Absolute 0 07 Thousand/µL      Basophils Absolute 0 01 Thousands/µL                  No orders to display              Procedures  Procedures       Phone Contacts  ED Phone Contact    ED Course  ED Course as of Jan 20 0656   Sun Jan 20, 2019   [id number]   Results reviewed with patient  Feels better  All questions answered to the patient's satisfaction    Recommend continued supportive treatment at home and follow up with OB                                       MDM  CritCare Time    Disposition  Final diagnoses:   Syncope   Orthostatic lightheadedness   Acute hypokalemia   Cannabis abuse   First trimester pregnancy   UTI (urinary tract infection) during pregnancy, first trimester     Time reflects when diagnosis was documented in both MDM as applicable and the Disposition within this note     Time User Action Codes Description Comment    1/20/2019  6:52 AM Sheri Vadim Add [R55] Syncope     1/20/2019  6:52 AM Naeem Morales Add [R42] Orthostatic lightheadedness     1/20/2019  6:53 AM Naeem Morales Add [E87 6] Acute hypokalemia     1/20/2019  6:53 AM Sheri Vadim Add [F12 10] Cannabis abuse     1/20/2019  6:53 AM Sheri Vadim Add [Z34 91] First trimester pregnancy     1/20/2019  6:55 AM Sheri Vadim Add [O23 41] UTI (urinary tract infection) during pregnancy, first trimester       ED Disposition     ED Disposition Condition Comment Discharge  [address] discharge to home/self care  Condition at discharge: Stable        Follow-up Information     Follow up With Specialties Details Why Contact Info [facility] [phone] [facility] Schedule an appointment as soon as possible for a visit  [address] [facility] [id number]  [facility], [address] [place] [address], [phone]          Patient's Medications   Discharge Prescriptions    NITROFURANTOIN (MACROBID) 100 MG CAPSULE    Take 1 capsule (100 mg total) by mouth 2 (two) times a day       Start Date: 1/20/2019 End Date: --       Order Dose: 100 mg       Quantity: 10 capsule    Refills: 0     No discharge procedures on file      ED Provider  Electronically Signed by           Eden Holland DO  01/20/19 5276

## 2019-01-20 NOTE — DISCHARGE INSTRUCTIONS
Urinary Tract Infection in Pregnancy   WHAT YOU NEED TO KNOW:   A urinary tract infection (UTI) is caused by bacteria that get inside your urinary tract  The urinary tract includes your kidneys and bladder  UTIs are common in women, especially during pregnancy  This is because of changes in your immune system, hormones, and uterus  As your uterus grows, your bladder may not completely empty  Bacteria can grow in the urine left in your bladder and cause a UTI  UTIs during pregnancy can increase your risk for a kidney infection and  labor  DISCHARGE INSTRUCTIONS:   Return to the emergency department if:   · You are urinating very little or not at all  · You have severe pain  · You have a fever and chills  Contact your healthcare provider if:   · You have pain in the sides of your back  · You do not feel better after 2 days of treatment  · You are vomiting  · You have questions or concerns about your condition or care  Medicines:   · Antibiotics  help fight a bacterial infection  · Medicines  may be given to decrease pain and burning when you urinate  They will also help decrease the feeling that you need to urinate often  These medicines will make your urine orange or red  · Take your medicine as directed  Contact your healthcare provider if you think your medicine is not helping or if you have side effects  Tell him or her if you are allergic to any medicine  Keep a list of the medicines, vitamins, and herbs you take  Include the amounts, and when and why you take them  Bring the list or the pill bottles to follow-up visits  Carry your medicine list with you in case of an emergency  Prevent another UTI:   · Urinate when you feel the urge  Do not hold your urine  Urinate as soon as needed  Always urinate after you have sex  This helps flush out bacteria passed during sex  · Drink liquids as directed  Ask how much liquid to drink each day and which liquids are best for you  You may need to drink more fluids than usual to help flush bacteria out of your urinary tract  Do not drink caffeine or carbonated liquids  These drinks can irritate your bladder  Your healthcare provider may recommend cranberry juice to help prevent a UTI  · Wipe from front to back after you urinate or have a bowel movement  This will help prevent germs from getting into your urinary tract through your urethra  · Do pelvic muscle exercises often  Pelvic muscle exercises may help you start and stop urinating  Strong pelvic muscles may help you empty your bladder easier  Squeeze these muscles tightly for 5 seconds like you are trying to hold back urine  Then relax for 5 seconds  Gradually work up to squeezing for 10 seconds  Do 3 sets of 15 repetitions a day, or as directed  Follow up with your healthcare provider as directed: You may need to return for more urine tests  Write down your questions so you remember to ask them during your visits  © 2017 [address] [employer] Information is for End User's use only and may not be sold, redistributed or otherwise used for commercial purposes  All illustrations and images included in CareNotes® are the copyrighted property of [employer]  or Esequiel Sebastian  The above information is an  only  It is not intended as medical advice for individual conditions or treatments  Talk to your doctor, nurse or pharmacist before following any medical regimen to see if it is safe and effective for you  Cannabis Abuse   WHAT YOU NEED TO KNOW:   Cannabis, also called marijuana, is a drug that comes from the cannabis sativa (hemp plant)  It may also be called pot, weed, or hash  Cannabis can be smoked, baked into food and eaten, or made into a tea you can drink  It can make you feel high, happy, or excited  The effects may start right away and last for 3 to 4 hours depending on whether you smoke or eat cannabis     DISCHARGE INSTRUCTIONS: Return to the emergency department if:   · The effects of cannabis have worn off, and you have shortness of breath, a fast heart rate, or chest pain  · You want to hurt or kill yourself or others  Contact your healthcare provider if:   · You cannot fight the urge to use cannabis  · You have stopped using cannabis, and feel that you cannot cope with your withdrawal symptoms  · You want help or more information on how to decrease or stop using cannabis  · You have questions or concerns about your condition or care  Signs of cannabis abuse:  Cannabis abuse is a pattern of use that causes physical or mental problems:  · The use of cannabis makes you unable to function at work, school, or in your home  You may be absent often, or your work may be done poorly  You may not be able to take care of your children or your home  · You use cannabis when it is dangerous to be under the effects of the drug  This includes when you drive a vehicle or use machinery  · You have problems with the police when you are under the effects of cannabis  · You keep using cannabis even when you argue with your family and friends about your use  · You need to use more cannabis to give you the high feeling or other effects that you want  You have withdrawal symptoms after you stop using cannabis  How cannabis affects your baby:  Cannabis use may keep your unborn baby from growing as fast as he should  It may harm your unborn baby's eyes and nervous system  When he gets older, he may have difficulty in school and with solving problems  He may also have a poor memory, or problems focusing or paying attention  He may be impulsive (reacting without thinking first)  He may be at an increased risk for depression  He may have a higher risk of smoking cigarettes and using cannabis when he is an adult     Signs of cannabis withdrawal:  Cannabis withdrawal happens when you have used cannabis for a long period of time, and you suddenly take less or stop taking it  Withdrawal symptoms may start on the first day and may last up to 2 weeks  You may have more than one of the following:  · Decreased appetite and weight loss     · Night sweats and trouble sleeping    · Craving for cannabis    · Irritability     · Feeling agitated, anxious, or restless    · Depressed or negative mood  Treatment:   · Brief intervention therapy  is done by meeting with a healthcare provider who will talk to and encourage you  During therapy, you will discuss your cannabis use with the healthcare provider  The healthcare provider will help you understand that you are responsible for making changes in your life  He will explain how you can be helped by decreasing or stopping cannabis use, and give you treatment options  · Cognitive behavioral therapy (CBT)  helps you change your thinking and behavior  It can help you manage depression and anxiety caused by cannabis use  CBT can help you learn good coping skills and ways to manage stress  CBT can be done with you and a talk therapist or in a group with others  · Motivational enhancement therapy  is used to help you set goals to change your behavior and stop cannabis abuse  · Group, marriage, and family therapy  can help you find support and motivation to stop cannabis abuse  Self-help group therapy includes meeting with others who also want to stop using cannabis  Marriage and family therapy can help you by including others to support you in your treatment  · A voucher program  provides vouchers (rewards) for attending therapy or not using cannabis  You may need to provide urine samples for testing  If your urine shows no signs of cannabis use, you may get a voucher  This program may report you to the court, or tell your family or friends if you decide to use cannabis  Follow up with your healthcare provider as directed:  Write down your questions so you remember to ask them during your visits  For more information:   · [employer]  [address] [place], [place] [address]  Phone: 5- 767 - 512-1194  Web Address: www [employer]  © 2017 [address] Information is for End User's use only and may not be sold, redistributed or otherwise used for commercial purposes  All illustrations and images included in CareNotes® are the copyrighted property of [employer]  or Esequiel Sebastian  The above information is an  only  It is not intended as medical advice for individual conditions or treatments  Talk to your doctor, nurse or pharmacist before following any medical regimen to see if it is safe and effective for you  Hypokalemia   WHAT YOU NEED TO KNOW:   Hypokalemia is a low level of potassium in your blood  Potassium helps control how your muscles, heart, and digestive system work  Hypokalemia occurs when your body loses too much potassium or does not absorb enough from food  DISCHARGE INSTRUCTIONS:   Return to the emergency department if:   · You cannot move your arm or leg  · You have a fast or irregular heartbeat  · You are too tired or weak to stand up  Contact your healthcare provider if:   · You are vomiting, or you have diarrhea  · You have numbness or tingling in your arms or legs  · Your symptoms do not go away or they get worse  · You have questions or concerns about your condition or care  Medicines:   · Potassium  will be given to bring your potassium levels back to normal     · Take your medicine as directed  Contact your healthcare provider if you think your medicine is not helping or if you have side effects  Tell him of her if you are allergic to any medicine  Keep a list of the medicines, vitamins, and herbs you take  Include the amounts, and when and why you take them  Bring the list or the pill bottles to follow-up visits  Carry your medicine list with you in case of an emergency    Eat foods that are high in potassium:  Foods that are high in potassium include bananas, oranges, tomatoes, potatoes, and avocado  [place] beans, turkey, salmon, lean beef, yogurt, and milk are also high in potassium  Ask your healthcare provider or dietitian for more information about foods that are high in potassium  Follow up with your healthcare provider as directed:  Write down your questions so you remember to ask them during your visits  © 2017 [address] [facility] Information is for End User's use only and may not be sold, redistributed or otherwise used for commercial purposes  All illustrations and images included in CareNotes® are the copyrighted property of [employer]  or Esequiel Sebastian  The above information is an  only  It is not intended as medical advice for individual conditions or treatments  Talk to your doctor, nurse or pharmacist before following any medical regimen to see if it is safe and effective for you  First Trimester Pregnancy   WHAT YOU NEED TO KNOW:   The first trimester of pregnancy lasts from your last period through the 13th week of pregnancy  Follow up with your healthcare provider for prenatal care  Regular prenatal care can help to keep you and your baby healthy  DISCHARGE INSTRUCTIONS:   Return to the emergency department if:   · You have pain or cramping in your abdomen or low back  · You have severe vaginal bleeding or clotting  Contact your healthcare provider or obstetrician if:   · You have light bleeding  · You have chills or a fever  · You have vaginal itching, burning, or pain  · You have yellow, green, white, or foul-smelling vaginal discharge  · You have pain or burning when you urinate, less urine than usual, or pink or bloody urine  · You have questions or concerns about your condition or care    Follow up with your healthcare provider or obstetrician as directed:  Go to all of your prenatal visits during your pregnancy  Write down your questions so you remember to ask them during your visits  Stay healthy during pregnancy:   · Take prenatal vitamins as directed  Prenatal vitamins can help you get the amount of vitamins and minerals you need during pregnancy  Prenatal vitamins may also decrease the risk of certain birth defects  · Eat a variety of healthy foods  Healthy foods include fruits, vegetables, whole-grain breads, low-fat dairy products, beans, turkey and chicken, and lean red meat  Ask your healthcare provider for more information about foods that are healthy and safe to eat during pregnancy  · Drink liquids as directed  Ask how much liquid to drink each day and which liquids are best for you  Some healthy liquids include milk, water, and juice  It is not clear how caffeine affects pregnancy  Limit your intake of caffeine to less than 200 mg each day to avoid possible health problems  Caffeine may be found in coffee, tea, cola, sports drinks, and chocolate  Do not drink alcohol  · Talk to your healthcare provider before you take medicines  Many medicines can harm your baby, especially during early pregnancy  Ask your healthcare provider before you take any medicines, including over-the-counter medicines, vitamins, herbs, or food supplements  Never use illegal or street drugs while you are pregnant  Talk to your healthcare provider if you are having trouble quitting street drugs  · Exercise  Ask your healthcare provider about the best exercise plan for you  Exercise may help you feel better and make your labor and delivery easier  · Do not smoke  Smoking can cause problems during pregnancy  It can also cause your baby to weigh less at birth  If you smoke, it is never too late to quit  Ask for information if you need help quitting  Safety tips:   · Do not use a hot tub or sauna  while you are pregnant, especially during your first trimester   Hot tubs and saunas may raise your baby's temperature and increase the risk of birth defects  It also increases your risk of miscarriage  · Protect yourself from illness  Toxoplasmosis is a disease that can cause birth defects  To protect yourself from this disease, do not clean your cat's litter box yourself  Ask someone else to clean your cat's litter box while you are pregnant  Also, do not  eat raw meat or unwashed fruits and vegetables  Wash your hands after you touch raw meat, and eat only well-cooked meat  Wash fruits and vegetables well before you eat them  © 2017 [address] [facility] Information is for End User's use only and may not be sold, redistributed or otherwise used for commercial purposes  All illustrations and images included in CareNotes® are the copyrighted property of [employer]  or Esequiel Sebastian  The above information is an  only  It is not intended as medical advice for individual conditions or treatments  Talk to your doctor, nurse or pharmacist before following any medical regimen to see if it is safe and effective for you  Syncope   WHAT YOU NEED TO KNOW:   Syncope is also called fainting or passing out  Syncope is a sudden, temporary loss of consciousness, followed by a fall from a standing or sitting position  Syncope ranges from not serious to a sign of a more serious condition that needs to be treated  You can control some health conditions that cause syncope  Your healthcare providers can help you create a plan to manage syncope and prevent episodes  DISCHARGE INSTRUCTIONS:   Seek care immediately if:   · You are bleeding because you hit your head when you fainted  · You suddenly have double vision, difficulty speaking, numbness, and cannot move your arms or legs  · You have chest pain and trouble breathing  · You vomit blood or material that looks like coffee grounds  · You see blood in your bowel movement    Contact your healthcare provider if:   · You have new or worsening symptoms  · You have another syncope episode  · You have a headache, fast heartbeat, or feel too dizzy to stand up  · You have questions or concerns about your condition or care  Follow up with your healthcare provider as directed:  Write down your questions so you remember to ask them during your visits  Manage syncope:   · Keep a record of your syncope episodes  Include your symptoms and your activity before and after the episode  The record can help your healthcare provider find the cause of your syncope and help you manage episodes  · Sit or lie down when needed  This includes when you feel dizzy, your throat is getting tight, and your vision changes  Raise your legs above the level of your heart  · Take slow, deep breaths if you start to breathe faster with anxiety or fear  This can help decrease dizziness and the feeling that you might faint  · Check your blood pressure often  This is important if you take medicine to lower your blood pressure  Check your blood pressure when you are lying down and when you are standing  Ask how often to check during the day  Keep a record of your blood pressure numbers  Your healthcare provider may use the record to help plan your treatment  Prevent a syncope episode:   · Move slowly and let yourself get used to one position before you move to another position  This is very important when you change from a lying or sitting position to a standing position  Take some deep breaths before you stand up from a lying position  Stand up slowly  Sudden movements may cause a fainting spell  Sit on the side of the bed or couch for a few minutes before you stand up  If you are on bedrest, try to be upright for about 2 hours each day, or as directed  Do not lock your legs if you are standing for a long period of time  Move your legs and bend your knees to keep blood flowing  · Follow your healthcare provider's recommendations    Your provider may  recommend that you drink more liquids to prevent dehydration  You may also need to have more salt to keep your blood pressure from dropping too low and causing syncope  Your provider will tell you how much liquid and sodium to have each day  · Watch for signs of low blood sugar  These include hunger, nervousness, sweating, and fast or fluttery heartbeats  Talk with your healthcare provider about ways to keep your blood sugar level steady  · Do not strain if you are constipated  You may faint if you strain to have a bowel movement  Walking is the best way to get your bowels moving  Eat foods high in fiber to make it easier to have a bowel movement  Good examples are high-fiber cereals, beans, vegetables, and whole-grain breads  Prune juice may help make bowel movements softer  · Be careful in hot weather  Heat can cause a syncope episode  Limit activity done outside on hot days  Physical activity in hot weather can lead to dehydration  This can cause an episode  © 2017 [address]  Information is for End User's use only and may not be sold, redistributed or otherwise used for commercial purposes  All illustrations and images included in CareNotes® are the copyrighted property of [employer]  or Esequiel Sebastian  The above information is an  only  It is not intended as medical advice for individual conditions or treatments  Talk to your doctor, nurse or pharmacist before following any medical regimen to see if it is safe and effective for you

## 2019-01-21 LAB
ATRIAL RATE: 104 BPM
BACTERIA UR CULT: NORMAL
P AXIS: 65 DEGREES
PR INTERVAL: 130 MS
QRS AXIS: 60 DEGREES
QRSD INTERVAL: 92 MS
QT INTERVAL: 376 MS
QTC INTERVAL: 494 MS
T WAVE AXIS: 45 DEGREES
VENTRICULAR RATE: 104 BPM

## 2019-01-21 PROCEDURE — 93010 ELECTROCARDIOGRAM REPORT: CPT | Performed by: INTERNAL MEDICINE

## 2019-03-22 ENCOUNTER — INITIAL PRENATAL (OUTPATIENT)
Dept: OBGYN CLINIC | Facility: CLINIC | Age: 24
End: 2019-03-22

## 2019-03-22 VITALS
DIASTOLIC BLOOD PRESSURE: 74 MMHG | HEART RATE: 81 BPM | SYSTOLIC BLOOD PRESSURE: 115 MMHG | WEIGHT: 136.2 LBS | BODY MASS INDEX: 23.25 KG/M2 | HEIGHT: 64 IN

## 2019-03-22 DIAGNOSIS — F12.90 MARIJUANA USE: ICD-10-CM

## 2019-03-22 DIAGNOSIS — Z3A.17 17 WEEKS GESTATION OF PREGNANCY: Primary | ICD-10-CM

## 2019-03-22 PROCEDURE — 99211 OFF/OP EST MAY X REQ PHY/QHP: CPT

## 2019-03-22 PROCEDURE — 80307 DRUG TEST PRSMV CHEM ANLYZR: CPT | Performed by: NURSE PRACTITIONER

## 2019-03-22 NOTE — PATIENT INSTRUCTIONS
Warning signs during pregnancy      When to Call    1  Vaginal bleeding  2  Sharp abdominal pain that does not go away  3  Fever (more than 100 4 and is not relieved by Tylenol)  4  Persistent vomiting lasting greater than 24 hours  5  Chest pain   6  Pain or burning when you urinate  7  Severe headache that doesn't resolve with Tylenol  8  Blurred vision or seeing spots in your vision  9  Sudden swelling of your face or hands  10  Redness, swelling or pain in a leg  11  A sudden weight gain in just a few days  12  Decrease in your baby's movement (after 28 weeks or the 6th month of pregnancy)  13  A loss of watery fluid from your vagina - can be a gush, a trickle or continuous wetness  14  After 20 weeks of pregnancy, rhythmic cramping (greater than 4 per hour) or menstrual like low/pelvic pain          Medications and Pregnancy: The following list of over-the-counter medications is usually considered safe to take during pregnancy  Take care to not double up on products containing acetaminophen (Tylenol)  Colds/Sore Throat   Robitussin DM - Plain (guaifenesin)   Saline nasal spray   Warm salt water gargle   Cepacol throat lozenges or mouthwash (cetylpyridinium)   Sucrets (hexylresoricinol)    Allergy  AVOID the D - or DECONGESTANT   Claritin (loratadine)   Zrytec (cetirizine)   Allerga (fexofenadine)   Headache/ Aches and Pains    Headaches / Aches and Pains:   Tylenol (acetaminophen)  Do NOT exceed more than 3000 mg of Tylenol in a 24-hour period      Heartburn   Mylanta (aluminum hydroxide/simethicone, magnesium hydroxide)   Maalaox (aluminum magnesium hydroxide, magnesium hydroxide)   Tums (calcium carbonate)   Riopan (magaldrate)    Constipation   Colace (docusate sodium)   Surfak (docusate sodium)   MiraLAX   Glycerin suppositories   Fleets enema (sodium phosphate & sodium biphosphate)    Nausea/Vomiting   Vitamin B6 (pyridoxine) - May take 50 mg at bedtime, 25 mg in the morning, 25 mg in the afternoon   Unisom (doxylamine) - May use for nausea/vomiting - (cut a 25 mg tablet in half)  May cause drowsiness  Sleep   Benadryl (diphenhydramine) - Take 1-2 tablets as needed at bedtime   Unisom (doxylamine) 25 mg tablet - As needed at bedtime   Melatonin 5 mg tablet - As needed at bedtime    Generally the generic form of medicine is usually lower priced than the brand name form of the medicine  Pregnancy   AMBULATORY CARE:   What you need to know about pregnancy:  A normal pregnancy lasts about 40 weeks  The first trimester lasts from your last period through the 12th week of pregnancy  The second trimester lasts from the 13th week of your pregnancy through the 23rd week  The third trimester lasts from your 24th week of pregnancy until your baby is born  If you know the date of your last period, your healthcare provider can estimate your due date  You may give birth to your baby any time from 37 weeks to 2 weeks after your due date  Seek care immediately if:   · You develop a severe headache that does not go away  · You have new or increased vision changes, such as blurred or spotted vision  · You have new or increased swelling in your face or hands  · You have pain or cramping in your abdomen or low back  · You have vaginal bleeding  Contact your healthcare provider or obstetrician if:   · You have abdominal cramps, pressure, or tightening  · You have a change in vaginal discharge  · You cannot keep food or drinks down, and you are losing weight  · You have chills or a fever  · You have vaginal itching, burning, or pain  · You have yellow, green, white, or foul-smelling vaginal discharge  · You have pain or burning when you urinate, less urine than usual, or pink or bloody urine  · You have questions or concerns about your condition or care    Body changes that may occur during your pregnancy:   · Breast changes  you will experience include tenderness and tingling during the early part of your pregnancy  Your breasts will become larger  You may need to use a support bra  You may see a thin, yellow fluid, called colostrum, leak from your nipples during the second trimester  Colostrum is a liquid that changes to milk about 3 days after you give birth  · Skin changes and stretch marks  may occur during your pregnancy  You may have red marks, called stretch marks, on your skin  Stretch marks will usually fade after pregnancy  Use lotion if your skin is dry and itchy  The skin on your face, around your nipples, and below your belly button may darken  Most of the time, your skin will return to its normal color after your baby is born  · Morning sickness  is nausea and vomiting that can happen at any time of day  Avoid fatty and spicy foods  Eat small meals throughout the day instead of large meals  Jacqueline may help to decrease nausea  Ask your healthcare provider about other ways of decreasing nausea and vomiting  · Heartburn  may be caused by changes in your hormones during pregnancy  Your growing uterus may also push your stomach upward and force stomach acid to back up into your esophagus  Eat 4 or 5 small meals each day instead of large meals  Avoid spicy foods  Avoid eating right before bedtime  · Constipation  may develop during your pregnancy  To treat constipation, eat foods high in fiber such as fiber cereals, beans, fruits, vegetables, whole-grain breads, and prune juice  Get regular exercise and drink plenty of water  Your healthcare provider may also suggest a fiber supplement to soften your bowel movements  Talk to your healthcare provider before you use any medicines to decrease constipation  · Hemorrhoids  are enlarged veins in the rectal area  They may cause pain, itching, and bright red bleeding from your rectum  To decrease your risk of hemorrhoids, prevent constipation and do not strain to have a bowel movement   If you have hemorrhoids, soak in a tub of warm water to ease discomfort  Ask your healthcare provider how you can treat hemorrhoids  · Leg cramps and swelling  may be caused by low calcium levels or the added weight of pregnancy  Raise your legs above the level of your heart to decrease swelling  During a leg cramp, stretch or massage the muscle that has the cramp  Heat may help decrease pain and muscle spasms  Apply heat on your muscle for 20 to 30 minutes every 2 hours for as many days as directed  · Back pain  may occur as your baby grows  Do not stand for long periods of time or lift heavy items  Use good posture while you stand, squat, or bend  Wear low-heeled shoes with good support  Rest may also help to relieve back pain  Ask your healthcare provider about exercises you can do to strengthen your back muscles  Stay healthy during your pregnancy:   · Eat a variety of healthy foods  Healthy foods include fruits, vegetables, whole-grain breads, low-fat dairy foods, beans, lean meats, and fish  Drink liquids as directed  Ask how much liquid to drink each day and which liquids are best for you  Limit caffeine to less than 200 milligrams each day  Limit your intake of fish to 2 servings each week  Choose fish low in mercury such as canned light tuna, shrimp, crab, salmon, cod, or tilapia  Do not  eat fish high in mercury such as swordfish, tilefish, nel mackerel, and shark  · Take prenatal vitamins as directed  Your need for certain vitamins and minerals, such as folic acid, increases during pregnancy  Prenatal vitamins provide some of the extra vitamins and minerals you need  Prenatal vitamins may also help to decrease the risk of certain birth defects  · Ask how much weight you should gain during your pregnancy  Too much or too little weight gain can be unhealthy for you and your baby  · Talk to your healthcare provider about exercise  Moderate exercise can help you stay fit   Your healthcare provider will help you plan an exercise program that is safe for you during pregnancy  · Do not smoke  If you smoke, it is never too late to quit  Smoking increases your risk of a miscarriage and other health problems during your pregnancy  Smoking can cause your baby to be born too early or weigh less at birth  Ask your healthcare provider for information if you need help quitting  · Do not drink alcohol  Alcohol passes from your body to your baby through the placenta  It can affect your baby's brain development and cause fetal alcohol syndrome (FAS)  FAS is a group of conditions that causes mental, behavior, and growth problems  · Talk to your healthcare provider before you take any medicines  Many medicines may harm your baby if you take them when you are pregnant  Do not take any medicines, vitamins, herbs, or supplements without first talking to your healthcare provider  Never use illegal or street drugs (such as marijuana or cocaine) while you are pregnant  Safety tips:   · Avoid hot tubs and saunas  Do not use a hot tub or sauna while you are pregnant, especially during your first trimester  Hot tubs and saunas may raise your baby's temperature and increase the risk of birth defects  · Avoid toxoplasmosis  This is an infection caused by eating raw meat or being around infected cat feces  It can cause birth defects, miscarriages, and other problems  Wash your hands after you touch raw meat  Make sure any meat is well-cooked before you eat it  Avoid raw eggs and unpasteurized milk  Use gloves or ask someone else to clean your cat's litter box while you are pregnant  · Ask your healthcare provider about travel  The most comfortable time to travel is during the second trimester  Ask your healthcare provider if you can travel after 36 weeks  You may not be able to travel in an airplane after 36 weeks  He may also recommend that you avoid long road trips    Follow up with your healthcare provider or obstetrician as directed:  Go to all of your prenatal visits during your pregnancy  Write down your questions so you remember to ask them during your visits  © 2017 [employer]  Information is for End User's use only and may not be sold, redistributed or otherwise used for commercial purposes  All illustrations and images included in CareNotes® are the copyrighted property of [employer]  or [employer]  The above information is an  only  It is not intended as medical advice for individual conditions or treatments  Talk to your doctor, nurse or pharmacist before following any medical regimen to see if it is safe and effective for you  Pregnancy Diet   WHAT YOU NEED TO KNOW:   What is a healthy diet during pregnancy? A healthy diet during pregnancy is a meal plan that provides the amount of calories and nutrients you need during pregnancy  Your body needs extra calories and nutrients to support your growing baby  You need to gain the right amount of weight for a healthy baby and pregnancy  Babies born at a healthy weight have a lower risk of certain health problems at birth and later in life  A healthy diet may help you avoid gaining too much weight  Too much weight gain may cause problems for you during your pregnancy and delivery  What should I avoid while I am pregnant? · Alcohol:  Do not drink alcohol during pregnancy  Alcohol can increase your risk of a miscarriage (losing your baby)  Your baby may also be born too small and have other health problems, such as learning problems later in life  · Caffeine: It is not clear how caffeine affects pregnancy  Limit your intake of caffeine to avoid possible health problems  Caffeine may be found in coffee, tea, cola, sports drinks, and chocolate  · Foods that contain mercury:  Mercury is naturally found in almost all types of fish and shellfish   Some types of fish absorb higher levels of mercury that can be harmful to an unborn baby  Eat only fish and shellfish that are low in mercury  Each week, you may eat up to 12 ounces of fish or shellfish that have low levels of mercury  These include shrimp, canned light tuna, salmon, pollock, and catfish  Eat only 6 ounces of albacore (white) tuna per week  Albacore tuna has more mercury than canned tuna  Do not eat shark, swordfish, nel mackerel, or tilefish  · Raw and undercooked foods: You should not eat undercooked or raw meat, poultry, eggs, fish, or shellfish (shrimp, crab, lobster)  Cook leftover foods and ready-to-eat foods such as hot dogs until they are steaming hot  · Unpasteurized food:  Unpasteurized foods are foods that have not gone through the heating process (pasteurization) that destroys bacteria  You should not drink milk, juice, or cheese that has not been pasteurized  This includes Brie, feta, Camembert, blue, and [place] cheeses  Which foods can I eat while I am pregnant? Eat a variety of foods from each of the food groups listed below  Your dietitian will tell you how many servings you should have from each food group each day to get enough calories  The amount of calories you need depends on your daily activity, your weight before pregnancy, and current weight gain  Healthcare providers divide pregnancy into 3 periods of time called trimesters  In the first trimester, you usually do not need extra calories  In the second and third trimesters, most women should eat about 300 extra calories each day  · Fruits and vegetables:  Half of your plate should contain fruits and vegetables  ¨ Fruits:  Choose fresh, canned, or dried fruit as often as possible  ¨ 1 cup of sliced, diced, cooked, or canned fruit (canned in light syrup or 100% juice)    ¨ 1 large peach, orange, or banana    ¨ ½ cup of dried fruit    ¨ 1 cup of fruit juice    ¨ Vegetables:  Eat more dark green, red, and orange vegetables   Dark green vegetables include broccoli, spinach, reddy lettuce, and mandy greens  Examples of orange and red vegetables are carrots, sweet potatoes, winter squash, oranges, and red peppers  ¨ 1 cup of cooked or raw vegetables    ¨ 1 cup of vegetable juice    ¨ 2 cups of raw leafy greens    · Grains:  Half of the grains you eat each day should be whole grains  ¨ Whole grains:      ¨ ½ cup of cooked brown rice or cooked oatmeal    ¨ 1 cup (1 ounce) of whole-grain dry cereal    ¨ 1 slice of 250% whole-wheat or rye bread    ¨ 3 cups of popped popcorn    ¨ Other grains:      ¨ ½ cup of cooked white rice or pasta    ¨ ½ of an English muffin    ¨ 1 small flour or corn tortilla    ¨ 1 mini-bagel    · Dairy foods:  Choose fat-free or low-fat dairy foods:    ¨ 1½ ounces of hard cheese (mozzarella, Swiss, cheddar)     ¨ 1 cup (8 ounces) of low-fat or fat-free milk or yogurt    ¨ 1 cup of low-fat frozen yogurt or pudding    · Meat and other protein sources:  Choose lean meats and poultry  Bake, broil, and grill meat instead of frying it  Include a variety of seafood in place of some meat and poultry each week  Eat a variety of protein foods:    ¨ ½ ounce of nuts (12 almonds, 24 pistachios, 7 walnut halves) or 1 tablespoon of peanut butter (1 ounce)    ¨ ¼ cup of soy tofu or tempeh (1 ounce)    ¨ 1 egg    ¨ ¼ cup of cooked dried beans, peas, or lentils (1 ounce)    ¨ 1 small chicken breast or 1 small trout (about 3 ounces)    ¨ 1 salmon steak (4 to 6 ounces)    ¨ 1 small lean hamburger (2 to 3 ounces)    · Fats:  Limit saturated fats, trans fats, and cholesterol  These unhealthy fats are found in shortening, butter, stick margarine, and animal fat  Choose healthy fats such as polyunsaturated and monounsaturated fats:     ¨ 1 tablespoon of canola, olive, corn, sunflower, or soybean oil    ¨ 1 tablespoon of soft margarine    ¨ 1 teaspoon of mayonnaise    ¨ 2 tablespoons of salad dressing    ¨ ½ of an avocado  What vitamin and mineral supplements may I need? Your healthcare provider will tell you if you need a supplement and the type you should take  Talk to your healthcare provider before you take any other kind of supplement, including herbal (natural) supplements  · Prenatal vitamins:  Eat a variety of healthy foods, even if you take a prenatal vitamin  If you forget to take your vitamin, do not take double the amount the next day  · Folic acid:  You need at least 202 mcg of folic acid each day before you get pregnant  Folic acid helps to form your baby's brain and spinal cord in early pregnancy  During pregnancy, your daily need for folic acid increases to about 600 mcg  Get folic acid each day by eating citrus fruits and juices, green leafy vegetables, liver, or dried beans  Folic acid is also added to foods such as breakfast cereals, bread products, flour, and pasta  · Iron:  Iron is a mineral the body needs to make hemoglobin, which is a part of red blood cells  Hemoglobin helps your blood carry oxygen from the lungs to the rest of your body  Foods that are good sources of iron are meat, poultry, fish, beans, spinach, and fortified cereals and breads  Your body will absorb iron better from non-meat sources if you have a source of vitamin C at the same time  Drink tea and coffee separately from iron-fortified foods and iron supplements  You need about 30 mg of iron each day during pregnancy  · Calcium and vitamin D:  Women who do not eat dairy products may need a calcium and vitamin D supplement  Talk to your healthcare provider about calcium supplements if you do not regularly eat good sources of calcium  The amount of calcium you need is about 1,300 mg if you are between 15and 25years old and 1,000 mg if you are 23to 48years old  What diet changes may help if I have morning sickness? Morning sickness is common during the first few months of pregnancy  You may feel nauseated, and you may vomit several times each day   To improve symptoms of morning sickness, eat small, frequent meals instead of 3 large meals  Foods high in carbohydrate, such as crackers, dry toast, and pasta, may be easier for you to eat  Drink liquids between meals rather than with meals  What diet changes may decrease constipation? A high-fiber diet can improve the symptoms of constipation  Whole-grain breakfast cereals, whole-grain breads, and prune juice are high in fiber  Raw fruits and vegetables, and cooked beans are also good sources of fiber  It may also be helpful to increase your intake of fluids and get regular physical activity  Talk with your healthcare provider before you begin any exercise program    What diet changes may decrease heartburn? To improve the symptoms of heartburn, do not lie down right after you eat  When you do lie down, sleep with your head slightly elevated  Eat small, frequent meals instead of 3 large meals  It may also be helpful to avoid caffeine, chocolate, and spicy foods  How can I get enough calcium if I cannot tolerate dairy foods? If you cannot drink milk or eat dairy foods, try lactose-free or lactose-reduced milk or calcium-fortified soy milk  Ask your healthcare provider about pills you can take to help you digest milk products  Eat other drinks and foods that are fortified with calcium, such as orange juice  What other healthy guidelines should I follow? · Vegetarians and vegans: If you are a vegetarian or vegan, get enough protein, vitamin B12, and iron during your pregnancy  Some non-meat sources of these nutrients are fortified cereals, nut butters, soy products (tofu and soymilk), nuts, grains, and legumes  These nutrients are also found in eggs and milk products  · Cravings: You may have cravings for certain foods during your pregnancy  Foods that are high in calories, fat, and sugar should not replace healthy food choices   Some women have cravings for unusual substances such as mio, dirt, laundry starch, ice, and meera  This condition is called pica  These may lead to health problems such as anemia and cause other health problems  When should I contact my healthcare provider? · You are losing weight without trying  · You have cravings for substances such as mio, dirt, laundry starch, or ice  · You have questions or concerns about your condition or care  CARE AGREEMENT:   You have the right to help plan your care  Discuss treatment options with your caregivers to decide what care you want to receive  You always have the right to refuse treatment  The above information is an  only  It is not intended as medical advice for individual conditions or treatments  Talk to your doctor, nurse or pharmacist before following any medical regimen to see if it is safe and effective for you  © 2017 [employer] Information is for End User's use only and may not be sold, redistributed or otherwise used for commercial purposes  All illustrations and images included in CareNotes® are the copyrighted property of [employer]  or [employer]sequiel Sebastian  Nausea and Vomiting in Pregnancy   AMBULATORY CARE:   Nausea and vomiting in pregnancy  can happen any time of day  These symptoms usually start before the 9th week of pregnancy, and end by the 14th week (second trimester)  Some women can have nausea and vomiting for a longer time  These symptoms can affect some women throughout the entire pregnancy  Nausea and vomiting do not harm your baby  These symptoms can make it hard for you to do your daily activities  Seek care immediately if:   · You have signs of dehydration  Examples are dark yellow urine, dry mouth and lips, dry skin, fast heartbeat, and urinating less than usual     · You have severe abdominal pain  · You feel too weak or dizzy to stand up  · You see blood in your vomit or bowel movements    Contact your healthcare provider if:   · You vomit more than 4 times in 1 day     · You have not been able to keep liquids down for more than 1 day  · You lose more than 2 pounds  · You have a fever  · Your nausea and vomiting continue longer than 14 weeks  · You have questions or concerns about your condition or care  Treatment  for nausea and vomiting in pregnancy is usually not needed  You can make changes in the foods you eat and in your activities to help manage your symptoms  You may need to try several things to learn what works for you  Talk to your healthcare provider if your symptoms do not decrease with the changes suggested below  You may need vitamin B6 and medicine if these changes do not help, or your symptoms become severe  Nutrition changes you can make to manage nausea and vomiting:   · Eat small meals throughout the day instead of 3 large meals  You may be more likely to have nausea and vomiting when your stomach is empty  Eat foods that are low in fat and high in protein  Examples are lean meat, beans, turkey, and chicken without the skin  Eat a small snack, such as crackers, dry cereal, or a small sandwich before you go to bed  · Eat some crackers or dry toast before you get out of bed in the morning  Get out of bed slowly  Sudden movements could cause you to get dizzy and nauseated  · Eat bland foods when you feel nauseated  Examples of bland foods include dry toast, dry cereal, plain pasta, white rice, and bread  Other bland foods include saltine crackers, bananas, gelatin, and pretzels  Avoid spicy, greasy, and fried foods  Avoid any other foods that make you feel nauseated  · Drink liquids that contain ginger  Drink ginger ale made with real ginger or ginger tea made with fresh grated ginger  Ginger capsules or ginger candies may also help to decrease nausea and vomiting  · Drink liquids between meals instead of with meals  Wait at least 30 minutes after you eat to drink liquids   Drink small amounts of liquids often throughout the day to prevent dehydration  Ask how much liquid you should drink each day  Other changes you can make to manage nausea and vomiting:   · Avoid smells that bother you  Strong odors may cause nausea and vomiting to start, or make it worse  Take a short walk, turn on a fan, or try to sleep with the window open to get fresh air  When you are cooking, open windows to get rid of smells that may cause nausea  · Do not brush your teeth right after you eat  if it makes you nauseated  · Rest when you need to  Start activity slowly and work up to your usual routine as you start to feel better  · Talk to your healthcare provider about your prenatal vitamins  Prenatal vitamins can cause nausea for some women  Try taking your prenatal vitamin at night or with a snack  If this change does not help, your healthcare provider may recommend a different type of vitamin  · Do not use any medicines, vitamins, or supplements to manage your symptoms without asking your healthcare provider  Many medicines can harm an unborn baby  · Light to moderate exercise  may help to decrease your symptoms  It may also help you to sleep better at night  Ask your healthcare provider about the best exercise plan for you  Follow up with your healthcare provider as directed:  Write down your questions so you remember to ask them during your visits  © 2017 [address] [facility] Information is for End User's use only and may not be sold, redistributed or otherwise used for commercial purposes  All illustrations and images included in CareNotes® are the copyrighted property of [employer]  or Esequiel Sebastian  The above information is an  only  It is not intended as medical advice for individual conditions or treatments  Talk to your doctor, nurse or pharmacist before following any medical regimen to see if it is safe and effective for you        Zika Virus: Information for Pregnant Women   AMBULATORY CARE:   Zika virus  Zika virus is carried by mosquitos  The virus is spread to a human through the bite of an infected mosquito  The virus may also be passed from one person to another through sex  [place] virus may be passed from a mother to her unborn baby  This may cause birth defects such as poor brain development  It may also cause pregnancy loss  There is currently no vaccine to prevent Zika virus infection  Common signs and symptoms include the following: You may not have signs or symptoms of Zika virus  If you develop symptoms, they may happen suddenly and last for 2 to 7 days  You may have any of the following:  · Fever    · Rash    · Headache    · Muscle or joint pain    · Red or itchy eyes  Contact your healthcare provider if:   · You think you have been exposed to [place] virus  · You have symptoms of Zika virus  · You have questions or concerns about your condition or care  Prevent mosquito bites:  Do not travel to areas where [place] virus is common  Ask your healthcare provider where it is safe to travel  Prevent mosquito bites to help decrease your risk for Zika virus infection:  · Apply insect repellent  Ask your healthcare provider which insect repellent is right for you  Most insect repellents are safe to use during pregnancy  Follow directions on the insect repellent container  The following is a list of tips for insect repellent use:     ¨ Do not  apply insect repellent to skin under clothing  ¨ Apply sunscreen before you apply insect repellent  ¨ Wear insect repellent any time you plan to be outside  Wear insect repellent at all times if you travel or live in a high-risk area  Reapply insect repellent as directed  ¨ Apply insect repellent every day for 3 weeks after you travel to high-risk areas  · Wear a long-sleeved shirt and pants  This will protect your skin from mosquito bites  · Use screens and nets  Use a mosquito net around your bed   When you travel, choose a place to stay with screens on all windows and doors  Place screens over windows and doors in your home  Fix holes or tears in screens and nets, or buy new screens and nets  · Keep doors and windows closed  If possible, use air conditioning to cool your home  · Apply insect repellent to clothing and gear  This includes boots, pants, socks, and tents  Do this when you camp, hike, or work outside  You can also buy clothing and gear that comes with insect repellent already on it  · Clean and empty containers of water once a week  Examples are animal bowls, buckets of water, gutters, flower vases, and bird baths  Mosquitoes lay eggs near water  Empty the water and scrub these containers with soap and water  Keep water containers covered with a tight-fitting lid, when possible  · Use insect sprays inside and outside of your home  Use an insect spray that is safe to use inside of your home  Place a device that sprays mosquitoes outside of your home  Place it in a dark, cool, area  Ask your healthcare provider where to buy these items  Follow directions that come with these products  Practice safe sex during pregnancy: The following will decrease your risk for Zika virus  It will also decrease the risk that you will pass Zika virus to your baby  · Do not have sex with a man or a woman who is infected with Zika virus while you are pregnant  Do not have sex with a man or a woman who has been exposed to [place] virus while you are pregnant  Your partner may be at risk for exposure if he or she has traveled to an area with [place] infection  Sex includes oral, vaginal, and anal sex  · If you choose to have sex during pregnancy, use a condom or latex barrier every time you have sex  Use protection for all types of sexual contact with a man or woman  This includes oral, vaginal, and anal sex  Use a new condom or latex barrier each time you have sex  Make sure that the condom fits and is put on correctly   If you are allergic to latex, use a nonlatex product such as polyurethane  For the most up-to-date information on Zika virus:  Knowledge about the [place] virus is changing quickly  Get the most up-to-date information at:  · Centers for Disease Control and Prevention Information on [address]  [address] [place] [facility]  Phone: 6- [phone] - [phone]  Web Address: [place] tn  Follow up with your healthcare provider as directed:  Write down your questions so you remember to ask them during your visits  © 2017 [address] [employer] Information is for End User's use only and may not be sold, redistributed or otherwise used for commercial purposes  All illustrations and images included in CareNotes® are the copyrighted property of A D A M , Inc  or Esequiel Sebastian  The above information is an  only  It is not intended as medical advice for individual conditions or treatments  Talk to your doctor, nurse or pharmacist before following any medical regimen to see if it is safe and effective for you  Influenza Vaccine   AMBULATORY CARE:   The influenza vaccine  is an injection given to help prevent influenza (flu)  The flu is caused by a virus  The virus spreads from person to person through coughing and sneezing  Several types of viruses cause the flu  The viruses change over time, so new vaccines are made each year  The vaccine begins to protect you about 2 weeks after you get it  The flu shot usually injected into your upper arm  It may be given in your thigh  You may get a vaccine with a weak or dead virus  Call 911 for any of the following:   · Your mouth and throat are swollen  · You are wheezing or have trouble breathing  · You have chest pain or your heart is beating faster than normal for you  · You feel like you are going to faint  Seek care immediately if:   · Your face is red or swollen      · You have hives that spread over your body     · You feel weak or dizzy  Contact your healthcare provider if:   · You have increased pain, redness, or swelling around the area where the shot was given  · You have questions or concerns about the influenza vaccine  When to get the influenza vaccine: The influenza vaccine is offered every year starting in September or October  Get the influenza vaccine as soon as it is available  Children 6 months to 6years old need 2 doses during the first year they get the vaccine  The 2 doses should be given at least 4 weeks apart  It is best if the same type of vaccine is given both times  The child can then receive 1 dose each year  Children 9 years or older should get 1 dose each year  Who should get the flu shot:   · Infants 6 months or older    · Any healthy adult who would like to decrease the risk for the flu    · Anyone living with or caring for children younger than 5 years     · Healthcare workers    · Anyone who lives in a long-term care facility    · Anyone who has chronic health problems, such as asthma, diabetes, or blood disorders    · Anyone who has a weak immune system    · Women who are or will be pregnant during the flu season  Who should not get the flu shot:  If you have an egg allergy, ask your healthcare provider if it is safe to get the flu shot  You will need to be closely monitored by a healthcare provider while you receive the vaccine, and for an hour or more after  The following should not get the flu shot:  · Infants younger than 6 months     · Anyone who has had an allergic reaction to the flu shot    · Anyone who is sick or has a fever    · Anyone who received a diagnosis of Guillain-Barré syndrome within 6 weeks of getting a flu vaccine    · Anyone who is allergic to thimerosal (mercury)  Risks of the influenza vaccine: The flu shot may cause mild symptoms, such as a fever, headache, and muscle aches   It may also cause mild to moderate soreness or redness at the area where you were given the shot  The nasal spray may cause a fever, runny or stuffy nose, headache, muscle aches, or vomiting  You may still get the flu after you receive the influenza vaccine  If you are allergic to eggs, ask about an egg-free vaccine  You may have an allergic reaction to the vaccine  This can be life-threatening  Follow up with your healthcare provider as directed:  Write down your questions so you remember to ask them during your visits  © 2017 [address] [employer]  Information is for End User's use only and may not be sold, redistributed or otherwise used for commercial purposes  All illustrations and images included in CareNotes® are the copyrighted property of A D A M , Inc  or Esequiel Romulo  The above information is an  only  It is not intended as medical advice for individual conditions or treatments  Talk to your doctor, nurse or pharmacist before following any medical regimen to see if it is safe and effective for you  Tdap:     Diphtheria/Acellular Pertussis/Tetanus Booster Vaccine (Tdap) (By injection)   Pertussis Vaccine, Acellular (per-TUS-iss VAX-een, q-WCDU-vyy-lar), Reduced Diphtheria Toxoid (ree-DOOST dif-THEER-ee-a TOX-oyd), Tetanus Toxoid (TET-a-nus TOX-oyd)  Protects against infections caused by tetanus (lockjaw), diphtheria, or pertussis (whooping cough)  This is a booster vaccine  Brand Name(s): Adacel, Boostrix   There may be other brand names for this medicine  When This Medicine Should Not Be Used: You should not receive this vaccine if you have had an allergic reaction to the separate or combined tetanus, diphtheria, or pertussis vaccine  You should not receive this vaccine if you have had seizures, mental changes, or any other serious reaction within 7 days after you received a pertussis vaccine  How to Use This Medicine:   Injectable  · A nurse or other health provider will give you this medicine    · Your doctor will prescribe your exact dose and tell you how often it should be given  This medicine is given as a shot into one of your muscles  · You may receive other vaccines at the same time as this one, but in a different body area  You should receive patient instructions for all of the vaccines  Talk to your doctor or nurse if you have questions  · Read and follow the patient instructions that come with this medicine  Talk to your doctor or pharmacist if you have any questions  Drugs and Foods to Avoid:   Ask your doctor or pharmacist before using any other medicine, including over-the-counter medicines, vitamins, and herbal products  · Make sure the doctor knows if you are receiving a treatment or medicine that weakens your immune system  This includes radiation treatment, steroid medicines (such as dexamethasone, hydrocortisone, methylprednisolone, prednisolone, prednisone, Medrol®), or cancer medicines  Warnings While Using This Medicine:   · Make sure your doctor knows if you are pregnant or breastfeeding or have epilepsy, a weak immune system, or a history of a stroke  Tell your doctor if you are sick or have a fever  · Tell your doctor about any reaction you had after you received a vaccine  This includes fainting, seizures, a fever over 105 degrees F, or severe redness or swelling where the shot was given  Tell your doctor if you have a history of Guillain-Barré syndrome after you received a vaccine with tetanus  · Call your doctor right away if you faint or have vision changes, numbness or tingling in your arms, hands, or feet, or a seizure after you receive this vaccine  · Tell your doctor if you have an allergy to latex  The syringes may contain dry natural latex rubber  · This vaccine will not treat an active infection  If you have a diphtheria, tetanus, or pertussis infection, you will need medicine to treat the infection    Possible Side Effects While Using This Medicine:   Call your doctor right away if you notice any of these side effects:  · Allergic reaction: Itching or hives, swelling in your face or hands, swelling or tingling in your mouth or throat, chest tightness, trouble breathing  · Changes in vision  · Fever over 105 degrees F  · Lightheadedness or fainting  · Numbness, tingling, or burning pain in your hands, arms, legs, or feet  · Seizures  · Sudden numbness or weakness in your arms or legs  · Severe pain, redness, or swelling where the shot was given  If you notice these less serious side effects, talk with your doctor:   · Headache  · Mild pain, redness, or swelling where the shot was given  · Nausea, vomiting, diarrhea, or stomach pain  · Tiredness  If you notice other side effects that you think are caused by this medicine, tell your doctor  Call your doctor for medical advice about side effects  You may report side effects to FDA at 1-107-FDA-2437  © 2017 [address] [employer] Information is for End User's use only and may not be sold, redistributed or otherwise used for commercial purposes  The above information is an  only  It is not intended as medical advice for individual conditions or treatments  Talk to your doctor, nurse or pharmacist before following any medical regimen to see if it is safe and effective for you

## 2019-03-22 NOTE — PROGRESS NOTES
OB Intake  o Patient presents for OB intake interview  o Accompanied by: FOB  o FOB:  - Involved: Yes    o     o Hx of  delivery prior to 36 weeks 6 days:                             no  o LMP:   Patient's last menstrual period was 2018  o Estimated Date of Delivery:19   - confirmed by LMP     o Signs and Symptoms of pregnancy:               positive home pregnancy test  - Constipation:    no  - Headaches:   no  - Cramping/spotting:    no  - PICA cravings:    no  - Diabetes: If you answer yes, please order 1 hr gtt testing, 50grams   History of gestational diabetes    no   BMI >35     no   Advance maternal age >35   no   First degree relative with type 2 diabetes    no   History of PCOS   no   Current metformin use    no   Prior history of macrosomia or LGA    no  o Immunization Record  Immunization History   Administered Date(s) Administered    Influenza Quadrivalent Preservative Free 3 years and older IM 10/20/2016    Tdap 2016   -   o Tdap:  - Counseled to be given after 28 weeks  o Influenza vaccine discussed  o MRSA questionnaire:     negative  o Dental visit within last 6 months  - no   - If no, recommendations discussed  Interview education:  Educational material provided on After Visit Summary (AVS)   Handouts given at todays visit  o Faheem Christine & me phone application guide  o [facility] Me support center  o CDCs Response to [address] [facility] [address] [place] Maternal Fetal Medicine  - Sequential screening pamphlet  - Cystic fibrosis pamphlet  o MARII letter given    - [address]  - Work   - Dentist    Nurse Family Partnership:    No  ONAF form submitted:    No    MyChart activated (not 1718 years of age)  No  - Code provided:   Yes    Interview done by: Harpreet Chandler RN 19    1  17 weeks gestation of pregnancy  Late prenatal care  - Prenatal Panel  - Hemoglobin Electrophoresis  - Ambulatory Referral to Maternal Fetal Medicine for dating U/S  Scheduled for 3/27 at 1030 in [place]    2  Marijuana use  - Toxicology screen sent today  Positive UDS in January in ED  Patient admits to marijuana use 3 weeks ago  Patient agreeable to talking to Venkatesh King our  at her H&P apt next Thursday

## 2019-03-27 ENCOUNTER — ULTRASOUND (OUTPATIENT)
Dept: PERINATAL CARE | Facility: CLINIC | Age: 24
End: 2019-03-27

## 2019-03-27 VITALS
BODY MASS INDEX: 23.22 KG/M2 | HEART RATE: 83 BPM | DIASTOLIC BLOOD PRESSURE: 82 MMHG | HEIGHT: 64 IN | WEIGHT: 136 LBS | SYSTOLIC BLOOD PRESSURE: 135 MMHG

## 2019-03-27 DIAGNOSIS — Z3A.17 17 WEEKS GESTATION OF PREGNANCY: ICD-10-CM

## 2019-03-27 DIAGNOSIS — Z36.3 ENCOUNTER FOR ANTENATAL SCREENING FOR MALFORMATION USING ULTRASOUND: Primary | ICD-10-CM

## 2019-03-27 PROCEDURE — 76805 OB US >/= 14 WKS SNGL FETUS: CPT | Performed by: OBSTETRICS & GYNECOLOGY

## 2019-03-27 PROCEDURE — 99212 OFFICE O/P EST SF 10 MIN: CPT | Performed by: OBSTETRICS & GYNECOLOGY

## 2019-03-27 RX ORDER — NITROFURANTOIN 25; 75 MG/1; MG/1
1 CAPSULE ORAL DAILY
COMMUNITY
Start: 2016-10-20 | End: 2019-08-20

## 2019-03-27 RX ORDER — ALBUTEROL SULFATE 90 UG/1
1-2 AEROSOL, METERED RESPIRATORY (INHALATION)
COMMUNITY
Start: 2016-07-28 | End: 2021-01-07 | Stop reason: SDUPTHER

## 2019-03-27 NOTE — PROGRESS NOTES
The patient was seen today for an ultrasound  Please see ultrasound report (located under Ob Procedures) for additional details  Thank you very much for allowing us to participate in the care of this very nice patient  Should you have any questions, please do not hesitate to contact me  MD Rafiq Wongucah  Attending Physician, [place]

## 2019-03-28 PROBLEM — R82.5 POSITIVE URINE DRUG SCREEN: Status: ACTIVE | Noted: 2019-03-28

## 2019-03-28 LAB
AMPHETAMINES UR QL SCN: NEGATIVE NG/ML
BARBITURATES UR QL SCN: NEGATIVE NG/ML
BENZODIAZ UR QL: NEGATIVE NG/ML
BZE UR QL: NEGATIVE NG/ML
CANNABINOIDS UR QL SCN: POSITIVE
METHADONE UR QL SCN: NEGATIVE NG/ML
OPIATES UR QL: NEGATIVE NG/ML
PCP UR QL: NEGATIVE NG/ML
PROPOXYPH UR QL SCN: NEGATIVE NG/ML

## 2019-04-03 ENCOUNTER — PATIENT OUTREACH (OUTPATIENT)
Dept: OBGYN CLINIC | Facility: CLINIC | Age: 24
End: 2019-04-03

## 2019-06-10 ENCOUNTER — TELEPHONE (OUTPATIENT)
Dept: OBGYN CLINIC | Facility: CLINIC | Age: 24
End: 2019-06-10

## 2019-08-20 ENCOUNTER — TELEPHONE (OUTPATIENT)
Dept: LABOR AND DELIVERY | Facility: HOSPITAL | Age: 24
End: 2019-08-20

## 2019-08-20 ENCOUNTER — HOSPITAL ENCOUNTER (OUTPATIENT)
Facility: HOSPITAL | Age: 24
Discharge: HOME | End: 2019-08-20
Attending: OBSTETRICS & GYNECOLOGY | Admitting: OBSTETRICS & GYNECOLOGY
Payer: COMMERCIAL

## 2019-08-20 ENCOUNTER — HOSPITAL ENCOUNTER (EMERGENCY)
Facility: HOSPITAL | Age: 24
Discharge: LEFT BEFORE DISCHARGE | End: 2019-08-20
Attending: EMERGENCY MEDICINE
Payer: COMMERCIAL

## 2019-08-20 VITALS
DIASTOLIC BLOOD PRESSURE: 74 MMHG | HEIGHT: 62 IN | SYSTOLIC BLOOD PRESSURE: 138 MMHG | HEART RATE: 106 BPM | RESPIRATION RATE: 16 BRPM | TEMPERATURE: 98.4 F | BODY MASS INDEX: 25.76 KG/M2 | WEIGHT: 140 LBS

## 2019-08-20 VITALS
WEIGHT: 140 LBS | RESPIRATION RATE: 16 BRPM | OXYGEN SATURATION: 98 % | TEMPERATURE: 99.4 F | DIASTOLIC BLOOD PRESSURE: 76 MMHG | HEIGHT: 62 IN | SYSTOLIC BLOOD PRESSURE: 129 MMHG | BODY MASS INDEX: 25.76 KG/M2 | HEART RATE: 96 BPM

## 2019-08-20 DIAGNOSIS — B37.3 CANDIDA VAGINITIS: Primary | ICD-10-CM

## 2019-08-20 DIAGNOSIS — Z3A.38 38 WEEKS GESTATION OF PREGNANCY: Primary | ICD-10-CM

## 2019-08-20 LAB
ABO GROUP BLD: NORMAL
ALBUMIN SERPL BCP-MCNC: 2.5 G/DL (ref 3.5–5)
ALP SERPL-CCNC: 106 U/L (ref 46–116)
ALT SERPL W P-5'-P-CCNC: 16 U/L (ref 12–78)
AMPHETAMINES SERPL QL SCN: NEGATIVE
ANION GAP SERPL CALCULATED.3IONS-SCNC: 11 MMOL/L (ref 4–13)
AST SERPL W P-5'-P-CCNC: 15 U/L (ref 5–45)
BACTERIA UR QL AUTO: ABNORMAL /HPF
BARBITURATES UR QL: NEGATIVE
BASOPHILS # BLD AUTO: 0.01 THOUSANDS/ΜL (ref 0–0.1)
BASOPHILS NFR BLD AUTO: 0 % (ref 0–1)
BENZODIAZ UR QL: NEGATIVE
BILIRUB SERPL-MCNC: 0.22 MG/DL (ref 0.2–1)
BILIRUB UR QL STRIP: NEGATIVE
BLD GP AB SCN SERPL QL: NEGATIVE
BUN SERPL-MCNC: 8 MG/DL (ref 5–25)
CALCIUM SERPL-MCNC: 8.3 MG/DL (ref 8.3–10.1)
CHLORIDE SERPL-SCNC: 105 MMOL/L (ref 100–108)
CLARITY UR: ABNORMAL
CO2 SERPL-SCNC: 22 MMOL/L (ref 21–32)
COCAINE UR QL: NEGATIVE
COLOR UR: YELLOW
CREAT SERPL-MCNC: 0.57 MG/DL (ref 0.6–1.3)
EOSINOPHIL # BLD AUTO: 0.02 THOUSAND/ΜL (ref 0–0.61)
EOSINOPHIL NFR BLD AUTO: 0 % (ref 0–6)
ERYTHROCYTE [DISTWIDTH] IN BLOOD BY AUTOMATED COUNT: 14.5 % (ref 11.6–15.1)
GFR SERPL CREATININE-BSD FRML MDRD: 133 ML/MIN/1.73SQ M
GLUCOSE 1H P 50 G GLC PO SERPL-MCNC: 159 MG/DL
GLUCOSE SERPL-MCNC: 123 MG/DL (ref 65–140)
GLUCOSE UR STRIP-MCNC: NEGATIVE MG/DL
HBV SURFACE AG SER QL: NORMAL
HCT VFR BLD AUTO: 31.1 % (ref 34.8–46.1)
HGB BLD-MCNC: 9.6 G/DL (ref 11.5–15.4)
HGB UR QL STRIP.AUTO: NEGATIVE
HIV 1+2 AB+HIV1 P24 AG SERPL QL IA: NORMAL
HIV1 P24 AG SER QL: NORMAL
IMM GRANULOCYTES # BLD AUTO: 0.02 THOUSAND/UL (ref 0–0.2)
IMM GRANULOCYTES NFR BLD AUTO: 0 % (ref 0–2)
KETONES UR STRIP-MCNC: NEGATIVE MG/DL
LEUKOCYTE ESTERASE UR QL STRIP: ABNORMAL
LYMPHOCYTES # BLD AUTO: 1.97 THOUSANDS/ΜL (ref 0.6–4.47)
LYMPHOCYTES NFR BLD AUTO: 25 % (ref 14–44)
MCH RBC QN AUTO: 26.1 PG (ref 26.8–34.3)
MCHC RBC AUTO-ENTMCNC: 30.9 G/DL (ref 31.4–37.4)
MCV RBC AUTO: 85 FL (ref 82–98)
METHADONE UR QL: NEGATIVE
MONOCYTES # BLD AUTO: 0.51 THOUSAND/ΜL (ref 0.17–1.22)
MONOCYTES NFR BLD AUTO: 7 % (ref 4–12)
NEUTROPHILS # BLD AUTO: 5.27 THOUSANDS/ΜL (ref 1.85–7.62)
NEUTS SEG NFR BLD AUTO: 68 % (ref 43–75)
NITRITE UR QL STRIP: NEGATIVE
NON-SQ EPI CELLS URNS QL MICRO: ABNORMAL /HPF
NRBC BLD AUTO-RTO: 0 /100 WBCS
OPIATES UR QL SCN: NEGATIVE
PCP UR QL: NEGATIVE
PH UR STRIP.AUTO: 6 [PH]
PLATELET # BLD AUTO: 236 THOUSANDS/UL (ref 149–390)
PMV BLD AUTO: 10.5 FL (ref 8.9–12.7)
POTASSIUM SERPL-SCNC: 3.7 MMOL/L (ref 3.5–5.3)
PROT SERPL-MCNC: 6.5 G/DL (ref 6.4–8.2)
PROT UR STRIP-MCNC: NEGATIVE MG/DL
RBC # BLD AUTO: 3.68 MILLION/UL (ref 3.81–5.12)
RBC #/AREA URNS AUTO: ABNORMAL /HPF
RH BLD: POSITIVE
RPR SER QL: NORMAL
RUBV IGG SERPL IA-ACNC: 85.8 IU/ML
SODIUM SERPL-SCNC: 138 MMOL/L (ref 136–145)
SP GR UR STRIP.AUTO: >=1.03 (ref 1–1.03)
SPECIMEN EXPIRATION DATE: NORMAL
THC UR QL: NEGATIVE
UROBILINOGEN UR QL STRIP.AUTO: 1 E.U./DL
WBC # BLD AUTO: 7.8 THOUSAND/UL (ref 4.31–10.16)
WBC #/AREA URNS AUTO: ABNORMAL /HPF

## 2019-08-20 PROCEDURE — NC001 PR NO CHARGE: Performed by: OBSTETRICS & GYNECOLOGY

## 2019-08-20 PROCEDURE — 99284 EMERGENCY DEPT VISIT MOD MDM: CPT

## 2019-08-20 PROCEDURE — 80053 COMPREHEN METABOLIC PANEL: CPT | Performed by: OBSTETRICS & GYNECOLOGY

## 2019-08-20 PROCEDURE — 99204 OFFICE O/P NEW MOD 45 MIN: CPT

## 2019-08-20 PROCEDURE — 87806 HIV AG W/HIV1&2 ANTB W/OPTIC: CPT | Performed by: OBSTETRICS & GYNECOLOGY

## 2019-08-20 PROCEDURE — 87086 URINE CULTURE/COLONY COUNT: CPT | Performed by: OBSTETRICS & GYNECOLOGY

## 2019-08-20 PROCEDURE — 87653 STREP B DNA AMP PROBE: CPT | Performed by: OBSTETRICS & GYNECOLOGY

## 2019-08-20 PROCEDURE — 80307 DRUG TEST PRSMV CHEM ANLYZR: CPT | Performed by: OBSTETRICS & GYNECOLOGY

## 2019-08-20 PROCEDURE — 81001 URINALYSIS AUTO W/SCOPE: CPT | Performed by: OBSTETRICS & GYNECOLOGY

## 2019-08-20 PROCEDURE — 76815 OB US LIMITED FETUS(S): CPT | Performed by: OBSTETRICS & GYNECOLOGY

## 2019-08-20 PROCEDURE — 90715 TDAP VACCINE 7 YRS/> IM: CPT | Performed by: OBSTETRICS & GYNECOLOGY

## 2019-08-20 PROCEDURE — 82950 GLUCOSE TEST: CPT | Performed by: OBSTETRICS & GYNECOLOGY

## 2019-08-20 PROCEDURE — 80081 OBSTETRIC PANEL INC HIV TSTG: CPT | Performed by: OBSTETRICS & GYNECOLOGY

## 2019-08-20 RX ORDER — FERROUS SULFATE TAB EC 324 MG (65 MG FE EQUIVALENT) 324 (65 FE) MG
324 TABLET DELAYED RESPONSE ORAL
Qty: 30 TABLET | Refills: 0 | Status: ON HOLD | OUTPATIENT
Start: 2019-08-20 | End: 2019-09-09

## 2019-08-20 RX ADMIN — TETANUS TOXOID, REDUCED DIPHTHERIA TOXOID AND ACELLULAR PERTUSSIS VACCINE, ADSORBED 0.5 ML: 5; 2.5; 8; 8; 2.5 SUSPENSION INTRAMUSCULAR at 13:25

## 2019-08-20 NOTE — PROGRESS NOTES
Triage Note - OB  Bhargavi Garcia 26 y o  female MRN: [id number]  Unit/Bed#: L&D 326- Encounter: [id number]    OB TRIAGE NOTE  Bhargavi Garcia  [id number]  2019  4:40 PM  L&D /L&D     ASSESS:  26 y o   at 38w1d with no prenatal care who is presenting for a rule out SROM  Membranes were found to be intact and she is not in active labor  PLAN  1) Rule out SROM      -Nitrazine positive      -No pooling seen on speculum exam, Negative for ferning, MAGGIE total 12 8 cm  Patient determined to have intact membranes      -KOH mount positive for hyphae and speculum exam revealed thick, white discharge       2) Poor prenatal care       -Patient has not had routine prenatal labs and testing  Was last seen for an ultrasound at 17w2d       -Ordered prenatal panel, rapid HIV, urine drug screen, UA, 1 hour GTT, GBS swab, CMP        -Given TDAP today       -CBC showed hgb 9 6, patient discharged on iron tablets      3) Discharge instructions         -Patient instructed to call if experiencing regular contractions, vaginal bleeding, further loss of fluid or decreased fetal movement  D/w Dr Raymond  ______________    SUBJECTIVE    MARII: Estimated Date of Delivery: 19    HPI Chronology:  26 y o  [place] 38w1d states that she felt two gushes of fluid around 2AM  She denied contractions or vaginal bleeding at this time  She initially went [facility] ED to be evaluated and then drove here  Of note, patient has not had prenatal care since her first ultrasound at 17w2d secondary to lack of insurance and problems with transportation  Contractions: no  Leakage: yes  Bleeding: no  Fetal Movement: yes  Pelvic pain: no    Vitals:   /74 (BP Location: Left arm)   Pulse (!) 106   Temp 98 4 °F (36 9 °C) (Oral)   Resp 16   Ht 5' 2" (1 575 m)   Wt 63 5 kg (140 lb)   LMP 2018 (LMP Unknown)   BMI 25 61 kg/m²   Body mass index is 25 61 kg/m²      Review of Systems   Constitutional: Negative for chills and fever  Respiratory: Negative for cough, chest tightness, shortness of breath and wheezing  Cardiovascular: Negative for chest pain and palpitations  Gastrointestinal: Negative for abdominal pain, nausea and vomiting  Genitourinary: Positive for vaginal discharge  Negative for pelvic pain, vaginal bleeding and vaginal pain  Musculoskeletal: Negative for back pain  Neurological: Negative for dizziness, light-headedness and headaches  Physical Exam   Constitutional: She is oriented to person, place, and time  She appears well-developed and well-nourished  Cardiovascular: Normal rate, regular rhythm and normal heart sounds  Pulmonary/Chest: Effort normal and breath sounds normal    Abdominal: There is no tenderness  There is no guarding  Genitourinary: Vaginal discharge found  Musculoskeletal: She exhibits no edema or tenderness  Neurological: She is alert and oriented to person, place, and time  Psychiatric: She has a normal mood and affect   Her behavior is normal  Thought content normal        FHT:  Baseline Rate: 125 bpm  Variability: Moderate 6-25 bpm  Accelerations: 15 x 15 or greater, At variable times  Decelerations: None  TOCO:   Contraction Frequency (minutes): irregular  Contraction Duration (seconds): 50-60  Contraction Quality: Mild    Cervical exam:  3/70/-3    Labs:   Recent Results (from the past 24 hour(s))   Rapid HIV 1/2 AB-AG Combo    Collection Time: 08/20/19 10:57 AM   Result Value Ref Range    Rapid HIV 1 AND 2 Non-Reactive Non-Reactive    HIV-1 P24 Ag Screen Non-Reactive Non-Reactive   Rapid drug screen, urine    Collection Time: 08/20/19 10:57 AM   Result Value Ref Range    Amph/Meth UR Negative Negative    Barbiturate Ur Negative Negative    Benzodiazepine Urine Negative Negative    Cocaine Urine Negative Negative    Methadone Urine Negative Negative    Opiate Urine Negative Negative    PCP Ur Negative Negative    THC Urine Negative Negative Urinalysis with reflex to microscopic    Collection Time: 08/20/19 10:57 AM   Result Value Ref Range    Color, UA Yellow     Clarity, UA Slightly Cloudy     Specific Gravity, UA >=1 030 1 003 - 1 030    pH, UA 6 0 4 5, 5 0, 5 5, 6 0, 6 5, 7 0, 7 5, 8 0    Leukocytes, UA Small (A) Negative    Nitrite, UA Negative Negative    Protein, UA Negative Negative mg/dl    Glucose, UA Negative Negative mg/dl    Ketones, UA Negative Negative mg/dl    Urobilinogen, UA 1 0 0 2, 1 0 E U /dl E U /dl    Bilirubin, UA Negative Negative    Blood, UA Negative Negative   Hepatitis B surface antigen    Collection Time: 08/20/19 10:57 AM   Result Value Ref Range    Hepatitis B Surface Ag Non-reactive Non-reactive, NonReactive - Confirmed   CBC and differential    Collection Time: 08/20/19 10:57 AM   Result Value Ref Range    WBC 7 80 4 31 - 10 16 Thousand/uL    RBC 3 68 (L) 3 81 - 5 12 Million/uL    Hemoglobin 9 6 (L) 11 5 - 15 4 g/dL    Hematocrit 31 1 (L) 34 8 - 46 1 %    MCV 85 82 - 98 fL    MCH 26 1 (L) 26 8 - 34 3 pg    MCHC 30 9 (L) 31 4 - 37 4 g/dL    RDW 14 5 11 6 - 15 1 %    MPV 10 5 8 9 - 12 7 fL    Platelets 995 288 - 120 Thousands/uL    nRBC 0 /100 WBCs    Neutrophils Relative 68 43 - 75 %    Immat GRANS % 0 0 - 2 %    Lymphocytes Relative 25 14 - 44 %    Monocytes Relative 7 4 - 12 %    Eosinophils Relative 0 0 - 6 %    Basophils Relative 0 0 - 1 %    Neutrophils Absolute 5 27 1 85 - 7 62 Thousands/µL    Immature Grans Absolute 0 02 0 00 - 0 20 Thousand/uL    Lymphocytes Absolute 1 97 0 60 - 4 47 Thousands/µL    Monocytes Absolute 0 51 0 17 - 1 22 Thousand/µL    Eosinophils Absolute 0 02 0 00 - 0 61 Thousand/µL    Basophils Absolute 0 01 0 00 - 0 10 Thousands/µL   Rubella antibody, IgG    Collection Time: 08/20/19 10:57 AM   Result Value Ref Range    Rubella IgG Quant 85 8 >9 9 IU/mL   Type and screen    Collection Time: 08/20/19 10:57 AM   Result Value Ref Range    ABO Grouping O     Rh Factor Positive     Antibody Screen Negative     Specimen Expiration Date [id number]    RPR    Collection Time: 08/20/19 10:57 AM   Result Value Ref Range    RPR Non-Reactive Non-Reactive   Urine Microscopic    Collection Time: 08/20/19 10:57 AM   Result Value Ref Range    RBC, UA None Seen None Seen, 0-5 /hpf    WBC, UA 2-4 (A) None Seen, 0-5, 5-55, 5-65 /hpf    Epithelial Cells Occasional None Seen, Occasional /hpf    Bacteria, UA Occasional None Seen, Occasional /hpf   Comprehensive metabolic panel    Collection Time: 08/20/19 11:49 AM   Result Value Ref Range    Sodium 138 136 - 145 mmol/L    Potassium 3 7 3 5 - 5 3 mmol/L    Chloride 105 100 - 108 mmol/L    CO2 22 21 - 32 mmol/L    ANION GAP 11 4 - 13 mmol/L    BUN 8 5 - 25 mg/dL    Creatinine 0 57 (L) 0 60 - 1 30 mg/dL    Glucose 123 65 - 140 mg/dL    Calcium 8 3 8 3 - 10 1 mg/dL    AST 15 5 - 45 U/L    ALT 16 12 - 78 U/L    Alkaline Phosphatase 106 46 - 116 U/L    Total Protein 6 5 6 4 - 8 2 g/dL    Albumin 2 5 (L) 3 5 - 5 0 g/dL    Total Bilirubin 0 22 0 20 - 1 00 mg/dL    eGFR 133 ml/min/1 73sq m   Glucose, 1H PG    Collection Time: 08/20/19  1:07 PM   Result Value Ref Range    Glucose 159 (H) <=134 mg/dL       Lab, Imaging and other studies: I have personally reviewed pertinent reports          Hunter Martines MD  8/20/2019  4:40 PM

## 2019-08-20 NOTE — ED PROVIDER NOTES
History  Chief Complaint   Patient presents with    Pregnancy Problem     38 weeks gestation and leaking fluid; woke up feeling damp between legs; sat up twice early this AM and felt a fluid gush with each time; uncertain if it is urine     26year-old female G2 P 1 approximately 38 weeks pregnant presents for evaluation of possible fluid leak  Patient reports waking up overnight feeling down between her legs, when she sat up she felt a gush of fluid  This occurred twice around 3 a m  Tomy Delong Patient reports mild vaginal pressure at this time but denies contractions, abdominal pain, nausea or vomiting  Patient has not had prenatal care since March, unknown if patient with gestational diabetes or other issues during those months of pregnancy  Patient last ultrasound approximately 17 weeks  History provided by:  Patient   used: No    Pregnancy Problem   Associated symptoms: vaginal discharge    Associated symptoms: no abdominal pain, no back pain, no dizziness, no dysuria, no fever and no nausea        Prior to Admission Medications   Prescriptions Last Dose Informant Patient Reported? Taking? Prenatal Multivit-Min-Fe-FA (PRENATAL VITAMINS PO) 8/19/2019 at Unknown time Self Yes Yes   Sig: Take 1 tablet by mouth daily   acetaminophen (TYLENOL) 325 mg tablet Unknown at Unknown time Self No No   Sig: Take one tablet every 4-6 hours as needed   Patient not taking: Reported on 3/22/2019   albuterol (VENTOLIN HFA) 90 mcg/act inhaler  Self Yes No   Sig: Inhale 1-2 puffs      Facility-Administered Medications: None       Past Medical History:   Diagnosis Date    Anemia     Asthma     Substance abuse ([facility] [address] )     marijuana use    Varicella     immunized       History reviewed  No pertinent surgical history  Family History   Problem Relation Age of Onset    Heart disease Mother      I have reviewed and agree with the history as documented      Social History     Tobacco Use    Smoking status: Never Smoker    Smokeless tobacco: Never Used   Substance Use Topics    Alcohol use: No    Drug use: Yes     Types: Marijuana     Comment: alast use at beginning of pregnancy then stopped using        Review of Systems   Constitutional: Negative for chills and fever  HENT: Negative for rhinorrhea and sore throat  Eyes: Negative for visual disturbance  Respiratory: Negative for cough and shortness of breath  Cardiovascular: Negative for chest pain and leg swelling  Gastrointestinal: Negative for abdominal pain, diarrhea, nausea and vomiting  Genitourinary: Positive for vaginal discharge and vaginal pain  Negative for dysuria  Musculoskeletal: Negative for back pain and myalgias  Skin: Negative for rash  Neurological: Negative for dizziness and headaches  Psychiatric/Behavioral: Negative for confusion  All other systems reviewed and are negative  Physical Exam  Physical Exam   Constitutional: She is oriented to person, place, and time  She appears well-developed and well-nourished  HENT:   Nose: Nose normal    Mouth/Throat: Oropharynx is clear and moist  No oropharyngeal exudate  Eyes: Pupils are equal, round, and reactive to light  Conjunctivae and EOM are normal  No scleral icterus  Neck: Normal range of motion  Neck supple  No JVD present  No tracheal deviation present  Cardiovascular: Normal rate, regular rhythm and normal heart sounds  No murmur heard  Pulmonary/Chest: Effort normal and breath sounds normal  No respiratory distress  She has no wheezes  She has no rales  Abdominal: Soft  Bowel sounds are normal  There is no tenderness  There is no rebound and no guarding  Gravid uterus   Genitourinary: Cervix exhibits no motion tenderness  Right adnexum displays no mass  Left adnexum displays no mass  No tenderness or bleeding in the vagina  No vaginal discharge found     Genitourinary Comments: As dilated 3 millimeters, head low in pelvis   Musculoskeletal: Normal range of motion  She exhibits no edema or tenderness  Neurological: She is alert and oriented to person, place, and time  No cranial nerve deficit or sensory deficit  She exhibits normal muscle tone  5/5 motor, nl sens   Skin: Skin is warm and dry  Psychiatric: She has a normal mood and affect  Her behavior is normal    Nursing note and vitals reviewed  Vital Signs  ED Triage Vitals [08/20/19 0823]   Temperature Pulse Respirations Blood Pressure SpO2   99 4 °F (37 4 °C) 96 16 129/76 98 %      Temp Source Heart Rate Source Patient Position - Orthostatic VS BP Location FiO2 (%)   Temporal Monitor Sitting Right arm --      Pain Score       No Pain           Vitals:    08/20/19 0823   BP: 129/76   Pulse: 96   Patient Position - Orthostatic VS: Sitting         Visual Acuity      ED Medications  Medications - No data to display    Diagnostic Studies  Results Reviewed     None                 No orders to display              Procedures  Procedures       ED Course  ED Course as of Aug 20 0853   Tue Aug 20, 2019   4047 Patient seen and examined at bedside  Fetal heart tones 132 by RN  On exam os dilated 3 centimeters  Discussed with patient plan for transfer to [place] [address] to Labor and delivery  Patient refusing transfer at this time and wishes to sign against medical advice  Discussed with patient risks of signing out against medical advice which include birth of baby EN route, death of mother, death of baby, hemorrhage, overall distress  Patient understands these risks and opportunity for questions given  Patient signed out against medical advice              MDM    Disposition  Final diagnoses:   38 weeks gestation of pregnancy     Time reflects when diagnosis was documented in both MDM as applicable and the Disposition within this note     Time User Action Codes Description Comment    8/20/2019  8:49 AM Jaswinder Garcia Add [Z3A 38] 38 weeks gestation of pregnancy       ED Disposition     ED Disposition Condition Date/Time Comment    ELIAS Rojas Aug 20, 2019  8:39 AM Date: 8/20/2019  Patient: Janice Hinson  Admitted: 8/20/2019  8:18 AM  Attending Provider: Konstantin Haskins DO    Janice Hinson or her authorized caregiver has made the decision for the patient to leave the emergency department against  the advice of her attending physician  She or her authorized caregiver has been informed and understands the inherent risks, including death, premature birth, labor en route to facility  She or her authorized caregiver has decided to accept the resp onsibility for this decision  Janice Hinson and all necessary parties have been advised that she may return for further evaluation or treatment  Her condition at time of discharge was stable  Janice Hinson had current vital signs as fo llows:  /76 (BP Location: Right arm)   Pulse 96   Temp 99 4 °F (37 4 °C) (Temporal)   Resp 16   Ht 5' 2" (1 575 m)   Wt 63 5 kg (140 lb)   LMP 11/21/2018 (LMP Unknown)         Follow-up Information    None         Discharge Medication List as of 8/20/2019  8:50 AM      CONTINUE these medications which have NOT CHANGED    Details   Prenatal Multivit-Min-Fe-FA (PRENATAL VITAMINS PO) Take 1 tablet by mouth daily, Starting Fri 7/15/2016, Historical Med      acetaminophen (TYLENOL) 325 mg tablet Take one tablet every 4-6 hours as needed, No Print      albuterol (VENTOLIN HFA) 90 mcg/act inhaler Inhale 1-2 puffs, Starting Thu 7/28/2016, Historical Med           No discharge procedures on file      ED Provider  Electronically Signed by           Konstantin Haskins DO  08/20/19 1381

## 2019-08-20 NOTE — DISCHARGE INSTRUCTIONS
Anemia   WHAT YOU NEED TO KNOW:   What is anemia? Anemia is a low number of red blood cells or a low amount of hemoglobin in your red blood cells  Hemoglobin is a protein that helps carry oxygen throughout your body  Red blood cells use iron to create hemoglobin  Anemia may develop if your body does not have enough iron  It may also develop if your body does not make enough red blood cells or they die faster than your body can make them  What increases my risk for anemia? · Trauma or surgery that causes massive blood loss    · A gastrointestinal bleed    · A woman's monthly period    · A family history of blood disease or anemia    · Liver or kidney disease, cancer, rheumatoid arthritis, or hyperthyroidism    · Alcohol abuse    · Lack of foods that contain iron, folic acid, or vitamin B12  What are the signs and symptoms of anemia? · Chest pain or a fast heartbeat    · Lightheadedness, dizziness, or shortness of breath    · Cold or pale skin    · Tiredness, weakness, or confusion  How is anemia diagnosed? Blood tests will show if you have anemia  How is anemia treated? Treatment depends on the type of anemia you have  You may need any of the following:  · Iron or folic acid supplements  help increase your red blood cell and hemoglobin levels  · Vitamin B12 injections  may help boost your red blood cell count and decrease your symptoms  · A blood transfusion  may be needed if your body cannot replace the blood you have lost     · Surgery  may be needed to stop bleeding, or if your anemia is severe  How can I prevent anemia? Eat healthy foods rich in iron and vitamin C  Nuts, meat, dark leafy green vegetables, and beans are high in iron and protein  Vitamin C helps your body absorb iron  Foods rich in vitamin C include oranges and other citrus fruits  Ask your healthcare provider for a list of other foods that are high in iron or vitamin C  Ask if you need to be on a special diet     Call 911 or have someone call 911 for any of the following:   · You lose consciousness  · You have severe chest pain  When should I seek immediate care? · You have dark or bloody bowel movements  When should I contact my healthcare provider? · Your symptoms are worse, even after treatment  · You have questions or concerns about your condition or care  CARE AGREEMENT:   You have the right to help plan your care  Learn about your health condition and how it may be treated  Discuss treatment options with your caregivers to decide what care you want to receive  You always have the right to refuse treatment  The above information is an  only  It is not intended as medical advice for individual conditions or treatments  Talk to your doctor, nurse or pharmacist before following any medical regimen to see if it is safe and effective for you  © 2017 [address] Information is for End User's use only and may not be sold, redistributed or otherwise used for commercial purposes  All illustrations and images included in CareNotes® are the copyrighted property of A D A M [employer] Inc  or Esequiel Sebastian  Pregnancy at 28 to [address]:   What changes are happening in my body? You are considered full term at the beginning of 37 weeks  Your breathing may be easier if your baby has moved down into a head-down position  You may need to urinate more often because the baby may be pressing on your bladder  You may also feel more discomfort and get tired easily  How do I care for myself at this stage of my pregnancy? · Eat a variety of healthy foods  Healthy foods include fruits, vegetables, whole-grain breads, low-fat dairy foods, beans, lean meats, and fish  Drink liquids as directed  Ask how much liquid to drink each day and which liquids are best for you  Limit caffeine to less than 200 milligrams each day  Limit your intake of fish to 2 servings each week  Choose fish low in mercury such as canned light tuna, shrimp, salmon, cod, or tilapia  Do not  eat fish high in mercury such as swordfish, tilefish, nel mackerel, and shark  · Take prenatal vitamins as directed  Your need for certain vitamins and minerals, such as folic acid, increases during pregnancy  Prenatal vitamins provide some of the extra vitamins and minerals you need  Prenatal vitamins may also help to decrease the risk of certain birth defects  · Rest as needed  Put your feet up if you have swelling in your ankles and feet  · Do not smoke  If you smoke, it is never too late to quit  Smoking increases your risk of a miscarriage and other health problems during your pregnancy  Smoking can cause your baby to be born too early or weigh less at birth  Ask your healthcare provider for information if you need help quitting  · Do not drink alcohol  Alcohol passes from your body to your baby through the placenta  It can affect your baby's brain development and cause fetal alcohol syndrome (FAS)  FAS is a group of conditions that causes mental, behavior, and growth problems  · Talk to your healthcare provider before you take any medicines  Many medicines may harm your baby if you take them when you are pregnant  Do not take any medicines, vitamins, herbs, or supplements without first talking to your healthcare provider  Never use illegal or street drugs (such as marijuana or cocaine) while you are pregnant  · Talk to your healthcare provider before you travel  You may not be able to travel in an airplane after 36 weeks  He may also recommend that you avoid long road trips  What are some safety tips during pregnancy? · Avoid hot tubs and saunas  Do not use a hot tub or sauna while you are pregnant, especially during your first trimester  Hot tubs and saunas may raise your baby's temperature and increase the risk of birth defects  · Avoid toxoplasmosis    This is an infection caused by eating raw meat or being around infected cat feces  It can cause birth defects, miscarriages, and other problems  Wash your hands after you touch raw meat  Make sure any meat is well-cooked before you eat it  Avoid raw eggs and unpasteurized milk  Use gloves or ask someone else to clean your cat's litter box while you are pregnant  · Ask your healthcare provider about travel  The most comfortable time to travel is during the second trimester  Ask your healthcare provider if you can travel after 36 weeks  You may not be able to travel in an airplane after 36 weeks  He may also recommend that you avoid long road trips  What changes are happening with my baby? By 38 weeks, your baby may weigh between 6 and 9 pounds  Your baby may be about 14 inches long from the top of the head to the rump (baby's bottom)  Your baby hears well enough to know your voice  As your baby gets larger, you may feel fewer kicks and more stretching and rolling  Your baby may move into a head-down position  Your baby will also rest lower in your abdomen  What do I need to know about prenatal care? Your healthcare provider will check your blood pressure and weight  You may also need the following:  · A urine test  may also be done to check for sugar and protein  These can be signs of gestational diabetes or infection  Protein in your urine may also be a sign of preeclampsia  Preeclampsia is a condition that can develop during week 20 or later of your pregnancy  It causes high blood pressure, and it can cause problems with your kidneys and other organs  · A blood test  may be done to check for anemia (low iron level)  · A Tdap vaccine  may be recommended by your healthcare provider  · A group B strep test  is a test that is done to check for group B strep infection  Group B strep is a type of bacteria that may be found in the vagina or rectum  It can be passed to your baby during delivery if you have it   Your healthcare provider will take swab your vagina or rectum and send the sample to the lab for tests  · Fundal height  is a measurement of your uterus to check your baby's growth  This number is usually the same as the number of weeks that you have been pregnant  Your healthcare provider may also check your baby's position  · Your baby's heart rate  will be checked  When should I seek immediate care? · You develop a severe headache that does not go away  · You have new or increased vision changes, such as blurred or spotted vision  · You have new or increased swelling in your face or hands  · You have vaginal spotting or bleeding  · Your water broke or you feel warm water gushing or trickling from your vagina  When should I contact my healthcare provider? · You have more than 5 contractions in 1 hour  · You notice any changes in your baby's movements  · You have abdominal cramps, pressure, or tightening  · You have a change in vaginal discharge  · You have chills or a fever  · You have vaginal itching, burning, or pain  · You have yellow, green, white, or foul-smelling vaginal discharge  · You have pain or burning when you urinate, less urine than usual, or pink or bloody urine  · You have questions or concerns about your condition or care  CARE AGREEMENT:   You have the right to help plan your care  Learn about your health condition and how it may be treated  Discuss treatment options with your caregivers to decide what care you want to receive  You always have the right to refuse treatment  The above information is an  only  It is not intended as medical advice for individual conditions or treatments  Talk to your doctor, nurse or pharmacist before following any medical regimen to see if it is safe and effective for you    © 2017 [address] Information is for End User's use only and may not be sold, redistributed or otherwise used for commercial purposes  All illustrations and images included in CareNotes® are the copyrighted property of [employer]  or Esequiel Sebastian  Glucose Screen for Gestational Diabetes   WHAT YOU NEED TO KNOW:   What is a glucose screen for gestational diabetes? A glucose screen for gestational diabetes is a blood test to find out if you have diabetes caused by pregnancy  The test measures the level of glucose (sugar) in your blood at different points in time  Why do I need a glucose screen? High blood glucose levels throughout your pregnancy can be harmful to you and to your unborn baby  You will need to manage your blood glucose if you do have gestational diabetes  Healthcare providers will help you create meal and exercise plans to keep your blood glucose levels stable  When is the glucose screen done? The test is usually done between 24 and 28 weeks of pregnancy  You may be tested during your first prenatal visit if you have an increased risk for diabetes  Your risk is increased if you have a family member with diabetes  You also have an increased risk if you are overweight or you have prediabetes  Your risk for gestational diabetes is also higher if you have given birth to a large baby before  How is the test done? Your healthcare provider may order either a one-step or two-step glucose screen  The steps for each type of glucose screen are listed below:  · One-step glucose screen:      ¨ You will fast for as many hours as your healthcare provider directs  ¨ A sample of blood is taken from a vein in your arm  It will be checked for the amount of glucose it contains  ¨ Your healthcare provider will then give you a glucose drink  ¨ Your blood will be tested again 1 hour and 2 hours after you finish the glucose drink  During this time, you cannot eat or drink anything other than water      ¨ Your healthcare provider will review your test results to see how your blood glucose level changed over time  · Two-step glucose screen:      ¨ First step:  You do not have to fast  You will be given a glucose drink  Your blood will be tested 1 hour after you finish the glucose drink  During this time, you cannot eat or drink anything other than water  If your blood glucose is higher than a certain level, another test will be ordered  ¨ Second step:      § You will fast for as many hours as your healthcare provider directs  § A sample of blood is taken from a vein in your arm  It will be checked for the amount of glucose it contains  § Your healthcare provider will then give you a glucose drink  § Your blood will be tested again 1 hour, 2 hours, and 3 hours after you finish the glucose drink  During this time, you cannot eat or drink anything other than water  § Your healthcare provider will review your test results to see how your blood glucose level changed over time  · You may need the test again 6 to 12 weeks after your baby is born  The test will check that your blood sugar levels returned to normal   CARE AGREEMENT:   You have the right to help plan your care  To help with this plan, you must learn about your lab tests  You can then discuss the results with your caregivers  Work with them to decide what care may be used to treat you  You always have the right to refuse treatment  The above information is an  only  It is not intended as medical advice for individual conditions or treatments  Talk to your doctor, nurse or pharmacist before following any medical regimen to see if it is safe and effective for you  © 2017 [address] Information is for End User's use only and may not be sold, redistributed or otherwise used for commercial purposes  All illustrations and images included in CareNotes® are the copyrighted property of A D A M , Inc  or Esequiel Sebastian

## 2019-08-20 NOTE — ED NOTES
Pt requesting to sign out AMA and progress to [address] [place] [address] via private vehicle  Both ER MD and this RN spoke with patient together regarding risks and benefits of going by private vehicle   Pt still wishes to be transported to [facility] by her female friend (present in room with patient)     Donald Severs, RN  08/20/19 0662

## 2019-08-21 LAB
BACTERIA UR CULT: NORMAL
HIV 1+2 AB+HIV1 P24 AG SERPL QL IA: NORMAL

## 2019-08-22 LAB — GP B STREP DNA SPEC QL NAA+PROBE: NORMAL

## 2019-09-09 ENCOUNTER — ANESTHESIA (INPATIENT)
Dept: ANESTHESIOLOGY | Facility: HOSPITAL | Age: 24
DRG: 560 | End: 2019-09-09
Payer: COMMERCIAL

## 2019-09-09 ENCOUNTER — HOSPITAL ENCOUNTER (INPATIENT)
Dept: LABOR AND DELIVERY | Facility: HOSPITAL | Age: 24
Discharge: HOME | DRG: 560 | End: 2019-09-09
Payer: COMMERCIAL

## 2019-09-09 ENCOUNTER — TELEPHONE (OUTPATIENT)
Dept: OBGYN CLINIC | Facility: CLINIC | Age: 24
End: 2019-09-09

## 2019-09-09 ENCOUNTER — ANESTHESIA EVENT (INPATIENT)
Dept: ANESTHESIOLOGY | Facility: HOSPITAL | Age: 24
DRG: 560 | End: 2019-09-09
Payer: COMMERCIAL

## 2019-09-09 ENCOUNTER — HOSPITAL ENCOUNTER (INPATIENT)
Facility: HOSPITAL | Age: 24
LOS: 2 days | Discharge: HOME | DRG: 560 | End: 2019-09-11
Attending: OBSTETRICS & GYNECOLOGY | Admitting: OBSTETRICS & GYNECOLOGY
Payer: COMMERCIAL

## 2019-09-09 DIAGNOSIS — Z3A.41 41 WEEKS GESTATION OF PREGNANCY: Primary | ICD-10-CM

## 2019-09-09 PROBLEM — O48.0 41 WEEKS GESTATION OF PREGNANCY: Status: ACTIVE | Noted: 2019-08-20

## 2019-09-09 LAB
ABO GROUP BLD: NORMAL
AMPHETAMINES SERPL QL SCN: NEGATIVE
BARBITURATES UR QL: NEGATIVE
BENZODIAZ UR QL: NEGATIVE
BLD GP AB SCN SERPL QL: NEGATIVE
COCAINE UR QL: NEGATIVE
ERYTHROCYTE [DISTWIDTH] IN BLOOD BY AUTOMATED COUNT: 15.1 % (ref 11.6–15.1)
HCT VFR BLD AUTO: 32.8 % (ref 34.8–46.1)
HGB BLD-MCNC: 10.1 G/DL (ref 11.5–15.4)
MCH RBC QN AUTO: 25.6 PG (ref 26.8–34.3)
MCHC RBC AUTO-ENTMCNC: 30.8 G/DL (ref 31.4–37.4)
MCV RBC AUTO: 83 FL (ref 82–98)
METHADONE UR QL: NEGATIVE
OPIATES UR QL SCN: NEGATIVE
PCP UR QL: NEGATIVE
PLATELET # BLD AUTO: 275 THOUSANDS/UL (ref 149–390)
PMV BLD AUTO: 10.1 FL (ref 8.9–12.7)
RBC # BLD AUTO: 3.94 MILLION/UL (ref 3.81–5.12)
RH BLD: POSITIVE
SPECIMEN EXPIRATION DATE: NORMAL
THC UR QL: NEGATIVE
WBC # BLD AUTO: 8.41 THOUSAND/UL (ref 4.31–10.16)

## 2019-09-09 PROCEDURE — NC001 PR NO CHARGE: Performed by: OBSTETRICS & GYNECOLOGY

## 2019-09-09 PROCEDURE — 86901 BLOOD TYPING SEROLOGIC RH(D): CPT | Performed by: OBSTETRICS & GYNECOLOGY

## 2019-09-09 PROCEDURE — 86592 SYPHILIS TEST NON-TREP QUAL: CPT | Performed by: OBSTETRICS & GYNECOLOGY

## 2019-09-09 PROCEDURE — 86850 RBC ANTIBODY SCREEN: CPT | Performed by: OBSTETRICS & GYNECOLOGY

## 2019-09-09 PROCEDURE — 80307 DRUG TEST PRSMV CHEM ANLYZR: CPT | Performed by: OBSTETRICS & GYNECOLOGY

## 2019-09-09 PROCEDURE — 4A1HXCZ MONITORING OF PRODUCTS OF CONCEPTION, CARDIAC RATE, EXTERNAL APPROACH: ICD-10-PCS | Performed by: OBSTETRICS & GYNECOLOGY

## 2019-09-09 PROCEDURE — 3E033VJ INTRODUCTION OF OTHER HORMONE INTO PERIPHERAL VEIN, PERCUTANEOUS APPROACH: ICD-10-PCS | Performed by: OBSTETRICS & GYNECOLOGY

## 2019-09-09 PROCEDURE — 85027 COMPLETE CBC AUTOMATED: CPT | Performed by: OBSTETRICS & GYNECOLOGY

## 2019-09-09 PROCEDURE — 86900 BLOOD TYPING SEROLOGIC ABO: CPT | Performed by: OBSTETRICS & GYNECOLOGY

## 2019-09-09 RX ORDER — OXYTOCIN/RINGER'S LACTATE 30/500 ML
1-30 PLASTIC BAG, INJECTION (ML) INTRAVENOUS
Status: DISCONTINUED | OUTPATIENT
Start: 2019-09-09 | End: 2019-09-10

## 2019-09-09 RX ORDER — ONDANSETRON 2 MG/ML
4 INJECTION INTRAMUSCULAR; INTRAVENOUS EVERY 6 HOURS PRN
Status: DISCONTINUED | OUTPATIENT
Start: 2019-09-09 | End: 2019-09-10

## 2019-09-09 RX ORDER — ROPIVACAINE HYDROCHLORIDE 2 MG/ML
INJECTION, SOLUTION EPIDURAL; INFILTRATION; PERINEURAL
Status: COMPLETED
Start: 2019-09-09 | End: 2019-09-09

## 2019-09-09 RX ORDER — LIDOCAINE HYDROCHLORIDE AND EPINEPHRINE 15; 5 MG/ML; UG/ML
INJECTION, SOLUTION EPIDURAL
Status: COMPLETED | OUTPATIENT
Start: 2019-09-09 | End: 2019-09-09

## 2019-09-09 RX ORDER — SODIUM CHLORIDE, SODIUM LACTATE, POTASSIUM CHLORIDE, CALCIUM CHLORIDE 600; 310; 30; 20 MG/100ML; MG/100ML; MG/100ML; MG/100ML
125 INJECTION, SOLUTION INTRAVENOUS CONTINUOUS
Status: DISCONTINUED | OUTPATIENT
Start: 2019-09-09 | End: 2019-09-10

## 2019-09-09 RX ORDER — ROPIVACAINE HYDROCHLORIDE 2 MG/ML
INJECTION, SOLUTION EPIDURAL; INFILTRATION; PERINEURAL AS NEEDED
Status: DISCONTINUED | OUTPATIENT
Start: 2019-09-09 | End: 2019-09-10 | Stop reason: SURG

## 2019-09-09 RX ADMIN — LIDOCAINE HYDROCHLORIDE AND EPINEPHRINE 3 ML: 15; 5 INJECTION, SOLUTION EPIDURAL at 23:34

## 2019-09-09 RX ADMIN — ROPIVACAINE HYDROCHLORIDE 5 ML: 2 INJECTION, SOLUTION EPIDURAL; INFILTRATION at 23:37

## 2019-09-09 RX ADMIN — Medication 2 MILLI-UNITS/MIN: at 21:21

## 2019-09-09 RX ADMIN — SODIUM CHLORIDE, SODIUM LACTATE, POTASSIUM CHLORIDE, AND CALCIUM CHLORIDE 125 ML/HR: .6; .31; .03; .02 INJECTION, SOLUTION INTRAVENOUS at 21:21

## 2019-09-09 RX ADMIN — ROPIVACAINE HYDROCHLORIDE 5 ML: 2 INJECTION, SOLUTION EPIDURAL; INFILTRATION at 23:34

## 2019-09-09 RX ADMIN — ROPIVACAINE HYDROCHLORIDE: 2 INJECTION, SOLUTION EPIDURAL; INFILTRATION at 23:40

## 2019-09-09 RX ADMIN — SODIUM CHLORIDE, SODIUM LACTATE, POTASSIUM CHLORIDE, AND CALCIUM CHLORIDE 125 ML/HR: .6; .31; .03; .02 INJECTION, SOLUTION INTRAVENOUS at 23:47

## 2019-09-09 NOTE — TELEPHONE ENCOUNTER
Informed pt that she will be getting induced tonight at 8 pm  I explained that she needs to arrive at the hospital and go through the ED and let them know she is there to get induced   Pt verbalized understanding and stated that she will have a ride to the hospital

## 2019-09-10 LAB
BASE EXCESS BLDCOA CALC-SCNC: -3.2 MMOL/L (ref 3–11)
BASE EXCESS BLDCOV CALC-SCNC: -1.7 MMOL/L (ref 1–9)
HCO3 BLDCOA-SCNC: 24.9 MMOL/L (ref 17.3–27.3)
HCO3 BLDCOV-SCNC: 22.1 MMOL/L (ref 12.2–28.6)
O2 CT VFR BLDCOA CALC: 5.2 ML/DL
OXYHGB MFR BLDCOA: 23.1 %
OXYHGB MFR BLDCOV: 79 %
PCO2 BLDCOA: 57 MM[HG] (ref 30–60)
PCO2 BLDCOV: 35.4 MM HG (ref 27–43)
PH BLDCOA: 7.26 [PH] (ref 7.23–7.43)
PH BLDCOV: 7.41 [PH] (ref 7.19–7.49)
PO2 BLDCOA: 16.1 MM HG (ref 5–25)
PO2 BLDCOV: 34.6 MM HG (ref 15–45)
RPR SER QL: NORMAL
SAO2 % BLDCOV: 19.4 ML/DL

## 2019-09-10 PROCEDURE — 59409 OBSTETRICAL CARE: CPT | Performed by: OBSTETRICS & GYNECOLOGY

## 2019-09-10 PROCEDURE — 10907ZC DRAINAGE OF AMNIOTIC FLUID, THERAPEUTIC FROM PRODUCTS OF CONCEPTION, VIA NATURAL OR ARTIFICIAL OPENING: ICD-10-PCS | Performed by: OBSTETRICS & GYNECOLOGY

## 2019-09-10 PROCEDURE — 82805 BLOOD GASES W/O2 SATURATION: CPT | Performed by: OBSTETRICS & GYNECOLOGY

## 2019-09-10 PROCEDURE — 0HQ9XZZ REPAIR PERINEUM SKIN, EXTERNAL APPROACH: ICD-10-PCS | Performed by: OBSTETRICS & GYNECOLOGY

## 2019-09-10 RX ORDER — CALCIUM CARBONATE 200(500)MG
1000 TABLET,CHEWABLE ORAL DAILY PRN
Status: DISCONTINUED | OUTPATIENT
Start: 2019-09-10 | End: 2019-09-11 | Stop reason: HOSPADM

## 2019-09-10 RX ORDER — OXYCODONE HYDROCHLORIDE AND ACETAMINOPHEN 5; 325 MG/1; MG/1
2 TABLET ORAL EVERY 4 HOURS PRN
Status: DISCONTINUED | OUTPATIENT
Start: 2019-09-10 | End: 2019-09-11 | Stop reason: HOSPADM

## 2019-09-10 RX ORDER — DOCUSATE SODIUM 100 MG/1
100 CAPSULE, LIQUID FILLED ORAL 2 TIMES DAILY
Status: DISCONTINUED | OUTPATIENT
Start: 2019-09-10 | End: 2019-09-11 | Stop reason: HOSPADM

## 2019-09-10 RX ORDER — ACETAMINOPHEN 325 MG/1
650 TABLET ORAL EVERY 6 HOURS PRN
Status: DISCONTINUED | OUTPATIENT
Start: 2019-09-10 | End: 2019-09-11 | Stop reason: HOSPADM

## 2019-09-10 RX ORDER — DIAPER,BRIEF,INFANT-TODD,DISP
1 EACH MISCELLANEOUS AS NEEDED
Status: DISCONTINUED | OUTPATIENT
Start: 2019-09-10 | End: 2019-09-11 | Stop reason: HOSPADM

## 2019-09-10 RX ORDER — DIPHENHYDRAMINE HYDROCHLORIDE 50 MG/ML
25 INJECTION INTRAMUSCULAR; INTRAVENOUS EVERY 6 HOURS PRN
Status: DISCONTINUED | OUTPATIENT
Start: 2019-09-10 | End: 2019-09-11 | Stop reason: HOSPADM

## 2019-09-10 RX ORDER — IBUPROFEN 600 MG/1
600 TABLET ORAL EVERY 6 HOURS PRN
Status: DISCONTINUED | OUTPATIENT
Start: 2019-09-10 | End: 2019-09-11 | Stop reason: HOSPADM

## 2019-09-10 RX ORDER — ONDANSETRON 2 MG/ML
4 INJECTION INTRAMUSCULAR; INTRAVENOUS EVERY 8 HOURS PRN
Status: DISCONTINUED | OUTPATIENT
Start: 2019-09-10 | End: 2019-09-11 | Stop reason: HOSPADM

## 2019-09-10 RX ORDER — OXYTOCIN/RINGER'S LACTATE 30/500 ML
250 PLASTIC BAG, INJECTION (ML) INTRAVENOUS CONTINUOUS
Status: ACTIVE | OUTPATIENT
Start: 2019-09-10 | End: 2019-09-10

## 2019-09-10 RX ORDER — OXYCODONE HYDROCHLORIDE AND ACETAMINOPHEN 5; 325 MG/1; MG/1
1 TABLET ORAL EVERY 4 HOURS PRN
Status: DISCONTINUED | OUTPATIENT
Start: 2019-09-10 | End: 2019-09-11 | Stop reason: HOSPADM

## 2019-09-10 RX ORDER — SIMETHICONE 80 MG
80 TABLET,CHEWABLE ORAL 4 TIMES DAILY PRN
Status: DISCONTINUED | OUTPATIENT
Start: 2019-09-10 | End: 2019-09-11 | Stop reason: HOSPADM

## 2019-09-10 RX ADMIN — DOCUSATE SODIUM 100 MG: 100 CAPSULE, LIQUID FILLED ORAL at 08:19

## 2019-09-10 RX ADMIN — DOCUSATE SODIUM 100 MG: 100 CAPSULE, LIQUID FILLED ORAL at 17:49

## 2019-09-10 RX ADMIN — ACETAMINOPHEN 650 MG: 325 TABLET ORAL at 08:19

## 2019-09-10 RX ADMIN — BENZOCAINE AND LEVOMENTHOL: 200; 5 SPRAY TOPICAL at 08:19

## 2019-09-10 RX ADMIN — IBUPROFEN 600 MG: 600 TABLET ORAL at 16:22

## 2019-09-10 NOTE — ANESTHESIA POSTPROCEDURE EVALUATION
Post-Op Assessment Note    CV Status:  Stable    Pain management: adequate     Mental Status:  Alert and awake   Hydration Status:  Euvolemic   PONV Controlled:  Controlled   Airway Patency:  Patent   Post Op Vitals Reviewed: Yes      Staff: Anesthesiologist     Post-op block assessment: catheter intact and no complications        /31 (09/10/19 0803)    Temp (!) 100 6 °F (38 1 °C) (09/10/19 0810)    Pulse (!) 117 (09/10/19 0803)   Resp      SpO2

## 2019-09-10 NOTE — ANESTHESIA PROCEDURE NOTES
Epidural Block    Patient location during procedure: OB  Start time: 9/9/2019 11:34 PM  Reason for block: at surgeon's request  Staffing  Anesthesiologist: Jarrett Bennett DO  Performed: anesthesiologist   Preanesthetic Checklist  Completed: patient identified, surgical consent, pre-op evaluation, timeout performed, IV checked, risks and benefits discussed and monitors and equipment checked  Epidural  Patient position: sitting  Prep: Betadine  Patient monitoring: frequent blood pressure checks  Approach: midline  Location: lumbar (1-5)  Injection technique: HAROON saline  Needle  Needle type: Tuohy   Needle gauge: 18 G  Catheter type: end hole  Catheter size: 20 G  Catheter at skin depth: 9 cmlidocaine 1 5% with epinephrine 1:200,000 test dose, 3 mLnegative aspiration for CSF, negative aspiration for heme and no paresthesia on injection  patient tolerated the procedure well with no immediate complications

## 2019-09-10 NOTE — OB LABOR/OXYTOCIN SAFETY PROGRESS
Oxytocin Safety Progress Check Note - Jazmin Howard 26 y o  female MRN: [id number]    Unit/Bed#: L&D 321-01 Encounter: [id number]    OB History    Para Term  AB Living   2 1 1 0 0 1   SAB TAB Ectopic Multiple Live Births   0 0 0 0 1     Gestational Age: 41w1d  Dose (jeff-units/min) Oxytocin: 10 jeff-units/min  Contraction Frequency (minutes): 2-3  Contraction Quality: Mild  Tachysystole: No   Dilation: 10  Dilation Complete Date: 09/10/19  Dilation Complete Time: 415  Effacement (%): 100  Station: 0  Baseline Rate: 130 bpm     FHR Category: Category I          Notes/comments:     AROM for clear fluid, complete and 0 station  Feels pressure with contractions, but otherwise no urge to push  Did a trial push, plan to labor down for 30-60 mins        Pierre Boast, MD 9/10/2019 4:18 AM

## 2019-09-10 NOTE — PLAN OF CARE
Problem: Knowledge Deficit  Goal: Verbalizes understanding of labor plan  Description  Assess patient/family/caregiver's baseline knowledge level and ability to understand information  Provide education via patient/family/caregiver's preferred learning method at appropriate level of understanding  1  Provide teaching at level of understanding  2  Provide teaching via preferred learning method(s)  9/9/2019 2034 by Peggy Aparicio RN  Outcome: Progressing  9/9/2019 2033 by Peggy Aparicio RN  Outcome: Progressing     Problem: Labor & Delivery  Goal: Manages discomfort  Description  Assess and monitor for signs and symptoms of discomfort  Assess patient's pain level regularly and per hospital policy  Administer medications as ordered  Support use of nonpharmacological methods to help control pain such as distraction, imagery, relaxation, and application of heat and cold  Collaborate with interdisciplinary team and patient to determine appropriate pain management plan  1  Include patient in decisions related to comfort  2  Offer non-pharmacological pain management interventions  3  Report ineffective pain management to physician  9/9/2019 2034 by Peggy Aparicio RN  Outcome: Progressing  9/9/2019 2033 by Peggy Aparicio RN  Outcome: Progressing  Goal: Patient vital signs are stable  Description  1  Assess vital signs - vaginal delivery    9/9/2019 2034 by Peggy Aparicio RN  Outcome: Progressing  9/9/2019 2033 by Peggy Aparicio RN  Outcome: Progressing

## 2019-09-10 NOTE — H&P
History & Physical - OB/GYN   Macy Yo 26 y o  female MRN: [id number]  Unit/Bed#: L&D 321-01 Encounter: [id number]    26 y o  Iván George at 41w0d by 2nd trimester US  She is a patient of [facility]  Chief complaint:  IOL    HPI:  Contractions:  no  Fetal movement:  yes  Vaginal bleeding:   no  Leaking of fluid:  no    Patient has had poor prenatal care  She completed most of her prenatal labs, but never completed her 3hr GTT  She denies any issues during the pregnancy  Pelvis proven to [id number]  Had an uncomplicated vaginal delivery three years ago  Pregnancy Complications:  1) Poor prenatal care  2) Failed 1hr GTT, never completed 3hr GTT  3) Anemia  4) Asthma  5) Hx of THC use    PMH:  Past Medical History:   Diagnosis Date    Anemia     Asthma     Substance abuse ([address] )     marijuana use    Varicella     immunized       PSH:  History reviewed  No pertinent surgical history  OB Hx:  OB History    Para Term  AB Living   2 1 1 0 0 1   SAB TAB Ectopic Multiple Live Births   0 0 0 0 1      # Outcome Date GA Lbr Devyn/2nd Weight Sex Delivery Anes PTL Lv   2 Current            1 Term 12/10/16 40w3d / 01:47 3780 g (8 lb 5 3 oz) F Vag-Spont EPI N CAROL      Name: Izabela Brown: 8  Apgar5: 9       Meds:  No current facility-administered medications on file prior to encounter        Current Outpatient Medications on File Prior to Encounter   Medication Sig Dispense Refill    albuterol (VENTOLIN HFA) 90 mcg/act inhaler Inhale 1-2 puffs      Prenatal Multivit-Min-Fe-FA (PRENATAL VITAMINS PO) Take 1 tablet by mouth daily      [DISCONTINUED] acetaminophen (TYLENOL) 325 mg tablet Take one tablet every 4-6 hours as needed 30 tablet 0    [DISCONTINUED] ferrous sulfate 324 (65 Fe) mg Take 1 tablet (324 mg total) by mouth 2 (two) times a day before meals 30 tablet 0    [DISCONTINUED] miconazole (MONISTAT 7) 100 mg vaginal suppository Insert 1 suppository (100 mg total) into the vagina daily at bedtime 7 suppository 0       Allergies: Allergies   Allergen Reactions    Pollen Extract Sneezing       Review of Systems   Respiratory: Negative for cough and shortness of breath  Cardiovascular: Negative for chest pain  Gastrointestinal: Negative for abdominal pain, diarrhea, nausea and vomiting  Genitourinary: Negative for vaginal bleeding, vaginal discharge and vaginal pain  All other systems reviewed and are negative  Physical Exam:  /78 (BP Location: Left arm)   Pulse (!) 118   Temp 99 6 °F (37 6 °C) (Oral)   Resp 18   Ht 5' 2" (1 575 m)   Wt 68 kg (150 lb)   LMP 11/21/2018 (LMP Unknown)   Breastfeeding? No   BMI 27 44 kg/m²     Physical Exam   Constitutional: She is oriented to person, place, and time  She appears well-developed and well-nourished  No distress  Cardiovascular: Normal rate  Pulmonary/Chest: Effort normal  No respiratory distress  Abdominal:   Gravid, soft, non-tender   Neurological: She is alert and oriented to person, place, and time  Skin: Skin is warm and dry  Psychiatric: She has a normal mood and affect  Her behavior is normal        Labs:  Blood type: O+  Antibody: negative  Group B strep: negative  HIV: negative  Hepatitis B: negative  RPR: non-reactive  Rubella: Immune  Varicella Immune  1 hour Glucose: 159    Estimated Fetal Weight: 8 lbs  Presentation: vertex    SVE: 4 / 70% / -2  FHT:  120 / Moderate 6 - 25 bpm / accels, no decels  [place]: irregular    Membranes: intact      Assessment:   26 y o  Cory Perez at 41w0d presenting for IOL  Plan:   1  Admit to L&D  2  CBC, RPR, type and screen  3  Pitocin titration for induction  4  Pain control: Epidural upon request    Discussed with Dr Kirstie Smyth MD  OB/GYN PGY-2  9/9/2019  9:09 PM

## 2019-09-10 NOTE — OB LABOR/OXYTOCIN SAFETY PROGRESS
Oxytocin Safety Progress Check Note - Alea Blue 26 y o  female MRN: [id number]    Unit/Bed#: L&D 321-01 Encounter: [id number]    OB History    Para Term  AB Living   2 1 1 0 0 1   SAB TAB Ectopic Multiple Live Births   0 0 0 0 1     Gestational Age: 41w1d  Dose (jeff-units/min) Oxytocin: 10 jeff-units/min  Contraction Frequency (minutes): 1 5-2 5  Contraction Quality: Mild  Tachysystole: No   Dilation: 4        Effacement (%): 70  Station: -2  Baseline Rate: 120 bpm     FHR Category: Category I          Notes/comments:     SVE deferred, patient comfortable        Rikki De La Rosa MD 9/10/2019 1:09 AM

## 2019-09-10 NOTE — ANESTHESIA PREPROCEDURE EVALUATION
Review of Systems/Medical History  Patient summary reviewed  Chart reviewed      Cardiovascular  Negative cardio ROS    Pulmonary  Asthma ,        GI/Hepatic  Negative GI/hepatic ROS          Negative  ROS        Endo/Other  Negative endo/other ROS      GYN  Negative gynecology ROS          Hematology  Anemia ,     Musculoskeletal  Negative musculoskeletal ROS        Neurology  Negative neurology ROS      Psychology   Negative psychology ROS              Physical Exam    Airway    Mallampati score: II  TM Distance: >3 FB  Neck ROM: full     Dental   No notable dental hx     Cardiovascular  Comment: Negative ROS, Cardiovascular exam normal    Pulmonary      Other Findings        Anesthesia Plan  ASA Score- 2     Anesthesia Type- epidural with ASA Monitors  Additional Monitors:   Airway Plan:         Plan Factors-    Induction-     Postoperative Plan-     Informed Consent- Anesthetic plan and risks discussed with patient

## 2019-09-10 NOTE — L&D DELIVERY NOTE
Vaginal Delivery Summary - OB/GYN   Tiffany Birch 26 y o  female MRN: [id number]  Unit/Bed#: L&D 321-01 Encounter: [id number]          Pre-delivery Diagnosis:   1  Pregnancy at 41w1d   2  Poor prenatal care  3  Elevated 1hr GTT  4  Anemia  5  Asthma  6  Hx of THC use    Post-delivery Diagnosis: same, delivered    Procedure: Spontaneous Vaginal Delivery, repair of 1st degree perineal laceration    Attending: Elba Wheatley    Assistant(s): Ro    Anesthesia: Epidural    QBL: [id number]    Complications: none apparent    Specimens:   1  Arterial and venous cord gases  2  Cord blood  3  Segment of umbilical cord  4  Placenta to storage     Findings:  1  Viable male on 9/10/2019 at [address] [place] [facility], with APGARS of 7 and 9 at 1 and 5 minutes respectively,  2  Spontaneous delivery of intact placenta at 0607  3  1st degree laceration repaired with 3-0 Vicryl rapide  4  Blood gases:   Arterial pH: 7 259   Arterial base excess: -3 2   Venous pH: 7 413   Venous base excess: -1 7    Disposition:  Patient tolerated the procedure well and was recovering in labor and delivery room       Brief history and labor course:  Ms Tiffany Birch is a 26 y o  [id number] at [address] [place] [address]  She presented to labor and delivery for induction of labor  She was started on pitocin titration for induction and had an uncomplicated labor course  Description of procedure:  After pushing for 3 minutes, at [address] [facility] patient delivered a viable male , wt pending, apgars of 7 (1 min) and 9 (5 min)  The fetal vertex delivered spontaneously  There was a nuchal cord that was reduced at the perineum  The anterior shoulder delivered atraumatically with maternal expulsive forces and the assistance of gentle downward traction  The posterior shoulder delivered with maternal expulsive forces and the assistance of gentle upward traction  The remainder of the fetus delivered spontaneously       Upon delivery, the infant was placed on the mothers abdomen and the cord was clamped and cut  The infant was noted to cry spontaneously and was moving all extremities appropriately  There was no evidence for injury  Awaiting nurse resuscitators evaluated the   Arterial and venous cord blood gases and cord blood was collected for analysis  These were promptly sent to the lab  In the immediate post-partum, 30 units of IV pitocin was administered, and the uterus was noted to contract down well with massage and pitocin  The placenta delivered spontaneously at 0607 and was noted to have a centrally inserted 3 vessel cord  The vagina, cervix, perineum, and rectum were inspected and there was noted to be a 1st degree perineal laceration which was repaired with a figure of eight suture of 3-0 Vicryl rapide  Good hemostasis was confirmed at the conclusion of this procedure  At the conclusion of the procedure, all needle, sponge, and instrument counts were noted to be correct  Patient tolerated the procedure well and was allowed to recover in labor and delivery room with family and  before being transferred to the post-partum floor  Dr Vera Lewis was present and participated in all key portions of the case        Veronica Velasquez MD  OB/GYN PGY-2  9/10/2019  6:27 AM

## 2019-09-10 NOTE — DISCHARGE SUMMARY
Discharge Summary - OB/GYN  Dipak Price 26 y o  female MRN: [id number]  Unit/Bed#: L&D 445-41 Encounter: [id number]    Admission Date: 2019     Discharge Date: 2019    Admitting Attending: Meghnan Brewer MD    Delivering Attending: Jessica Mccall    Discharging Attending: Dr Federico Becerra    Principal Diagnosis: Pregnancy at 41w1d    Secondary Diagnosis:   1  Poor prenatal care  2  Elevated 1hr GTT  3  Asthma  4  Anemia    Procedures: spontaneous vaginal delivery    Anesthesia: epidural    Hospital course:  Ms Dipak Price is a 26 y o  [id number] at [address] [place] [address]  She presented to labor and delivery for induction of labor  She was started on pitocin titration for induction and had an uncomplicated labor course  She delivered a viable male  on 2019 at [facility] [facility]  Weight 8lbs 9oz via normal spontaneous vaginal delivery  She sustained a 1st degree laceration during delivery which was adequately repaired  Apgars were 7 (1 min) and 9 (5 min)   was transferred to  nursery  Patient tolerated the procedure well  Her post-delivery course was uncomplicated  Her postpartum pain was well controlled with oral analgesics  Case management was consulted for history of THC use and no concerns were identified  She received depo provera for contraception  On day of discharge, she was ambulating and able to reasonably perform all ADLs  She was voiding and had appropriate bowel function  Pain was well controlled  She was discharged home on postpartum day #1 without complications  Patient was instructed to follow up with her OB as an outpatient and was given appropriate warnings to call provider if she develops signs of infection or uncontrolled pain  Complications: none apparent    Condition at discharge: good     Discharge instructions/Information to patient and family:   - Do not place anything (no partner, tampons or douche) in your vagina for 6 weeks    -You may walk for exercise for the first 6 weeks then gradually return to your usual activities    -Please do not drive for 1 week if you have no stitches and for 2 weeks if you have stitches or underwent a  delivery     -You may take baths or shower per your preference    -Please look at your bust (breasts) in the mirror daily and call for redness or tenderness or increased warmth    -Please call us for temperature > 100 4*F or 38* C, worsening pain or a foul discharge       Discharge Medications:   Prenatal vitamin daily for 6 months or the duration of nursing whichever is longer  Motrin 600 mg orally every 6 hours as needed for pain  Tylenol (over the counter) per bottle directions as needed for pain: do NOT use with percocet  Hydrocortisone cream 1% (over the counter) applied 1-2x daily to hemorrhoids as needed    Provisions for Follow-Up Care: Follow up with your doctor in 3 weeks for postpartum care       Planned Readmission: No    Tim Webb MD  OBGYN, PGY-3  2019  3:54 PM

## 2019-09-10 NOTE — DISCHARGE INSTRUCTIONS
Self Care After Delivery   AMBULATORY CARE:   The postpartum period  is the period of time from delivery to about 6 weeks  During this time you may experience many physical and emotional changes  It is important to understand what is normal and when you need to call your healthcare provider  It is also important to know how to care for yourself during this time  Call 911 for any of the following:   · You soak through 1 pad in 15 minutes, have blurry vision, clammy or pale skin, and feel faint  · You faint or lose consciousness  · Your heart is beating faster than normal      · You have trouble breathing  · You cough up blood  Seek care immediately if:   · You soak through 1 or more pads in an hour, or pass blood clots larger than a quarter from your vagina  · Your leg is painful, red, and larger than normal      · You have severe abdominal pain  · You have a bad headache or changes in your vision  · Your episiotomy or C section incision is red, swollen, bleeding, or draining pus  · Your incision comes apart  · You see or hear things that are not there, or have thoughts of harming yourself or your baby  Contact your obstetrician or midwife if:   · You have a fever  · You have new or worsening pain in your abdomen or vagina  · You continue to have the baby blues 10 days after you deliver  · You have trouble sleeping  · You have foul-smelling discharge from your vagina  · You have pain or burning when you urinate  · You do not have a bowel movement for 3 days or more  · You have nausea or are vomiting  · You have hard lumps or red streaks over your breasts  · You have cracked nipples or bleed from your nipples  · You have questions or concerns about your condition or care  Physical changes:   The following are normal changes after you give birth:  · Pain in the area between your anus and vagina    · Breast pain    · Constipation or hemorrhoids    · Hot or cold flashes    · Vaginal bleeding or discharge    · Mild to moderate abdominal cramping    · Difficulty controlling bowel movements or urine  Emotional changes: The following are symptoms of the baby blues, or normal emotions after you give birth  The changes in your emotions may be caused by a drop in hormone levels after you deliver  If these symptoms last longer than 1 to 2 weeks after you give birth, you may have postpartum depression  · Feeling irritable    · Feeling sad    · Crying for no reason    · Feeling anxious  Breast care for nursing mothers: You may have sore breasts for 3 to 6 days after you give birth  This happens as your milk begins to fill your breasts  You may also have sore breasts if you do not breastfeed frequently  Do the following to care for your breasts:  · Apply a moist, warm, compress to your breast as directed  This may help soothe your breasts  Make sure the washcloth is not too hot before you apply it to your breast      · Nurse your baby or pump your milk frequently  This may prevent clogged milk ducts  Ask your healthcare provider how often to nurse or pump  · Massage your breasts as directed  This may help increase your milk flow  Gently rub your breasts in a circular motion before you breastfeed  You may need to gently squeeze your breast or nipple to help release milk  You can also use a breast pump to help release milk from your breast      · Wash your breasts with warm water only  Do not put soap on your nipples  Soap may cause your nipples to become dry  · Apply lanolin cream to your nipples as directed  Lanolin cream may add moisture to your skin and prevent nipple dryness  Always  wash off lanolin cream with warm water before you breastfeed  · Place pads in your bra  Your nipples may leak milk when you are not breastfeeding  You can place pads inside of your bra to help prevent leaking onto your clothing   Ask your healthcare provider where to purchase bra pads  · Get breastfeeding support if needed  There are healthcare providers who can answer questions about breastfeeding and provide you with support  Ask your healthcare provider who you can contact if you need breastfeeding support  Breast care for non-nursing mothers:  Milk will fill your breasts even if you bottle feed your baby  Do the following to help stop your milk from filling your breasts and causing pain:  · Wear a bra with support at all times  A sports bra or a tight-fitting bra will help stop your milk from coming in  · Apply ice on each breast for 15 to 20 minutes every hour or as directed  Use an ice pack, or put crushed ice in a plastic bag  Cover it with a towel  Ice helps your milk ducts shrink  · Keep your breasts away from warm water  Warm water will make it easier for milk to fill your breasts  Stand with your breasts away from warm water in the shower  · Limit how much you touch your breasts  This will prevent them from filling with milk  Perineum care: Your perineum is the area between your rectum and vagina  It is normal to have swelling and pain in this area after you give birth  If you had an episiotomy, your healthcare provider may give you special instructions  · Clean your perineum after you use the bathroom  This may prevent infection and help with healing  Use a spray bottle with warm water to clean your perineum  You may also gently spray warm water against your perineum when you urinate  Always wipe front to back  · Take a sitz bath as directed  A sitz bath may help relieve swelling and pain  Fill your bath tub or bucket with water up to your hips and sit in the water  Use cold water for 2 days after you deliver  Then use warm water  Ask your healthcare provider for more information about a sitz bath  · Apply ice packs for the first 24 hours or as directed  Use a plastic glove filled with ice or buy an ice pack   Wrap the ice pack or plastic glove in a small towel or wash cloth  Place the ice pack on your perineum for 20 minutes at a time  · Sit on a donut-shaped pillow  This may relieve pressure on your perineum when you sit  · Use wipes with medicine or take pills as directed  Your healthcare provider may tell you to use witch hazel pads  You can place witch hazel pads in the refrigerator before you apply them to your perineum  He may also tell you to take NSAIDs  Ask your healthcare provider how often to take pills or use wipes with medicine  · Do not go swimming or take tub baths for 4 to 6 weeks or as directed  This will help prevent an infection in your vagina or uterus  Bowel and bladder care: It may take 3 to 5 days to have a bowel movement after you deliver your baby  You can do the following to prevent or manage constipation, and get control of your bowel or bladder:  · Take stool softeners as directed  A stool softener is medicine that will make your bowel movements softer  This may prevent or relieve constipation  A stool softener may also make bowel movements less painful  · Drink plenty of liquids  Ask how much liquid to drink each day and which liquids are best for you  Liquids may help prevent constipation  · Eat foods high in fiber  Examples include fruits, vegetables, grains, beans, and lentils  Ask your healthcare provider how much fiber you need each day  Fiber may prevent constipation  · Do Kegel exercises as directed  Kegel exercises will help strengthen the muscles that control bowel movements and urination  Ask your healthcare provider for more information on Kegel exercises  · Apply cold compresses or medicine to hemorrhoids as directed  This may relieve swelling and pain  Your healthcare provider may tell you to apply ice or wipes with medicine to your hemorrhoids  He may also tell you to use a sitz bath   Ask your healthcare for more information on how to manage hemorrhoids  Nutrition:  Good nutrition is important in the postpartum period  It will help you return to a healthy weight, increase your energy levels, and prevent constipation  It will also help you get enough nutrients and calories if you are going to breastfeed your baby  · Eat a variety of healthy foods  Healthy foods include fruits, vegetables, whole-grain breads, low-fat dairy products, beans, lean meats, and fish  You may need 500 to 700 additional calories each day if you breastfeed your baby  You may also need extra protein  · Limit foods with added sugar and high amounts of fat  These foods are high in calories and low in healthy nutrients  Read food labels so you know how much sugar and fat is in the food you want to eat  · Drink 8 to 10 glasses of water per day  Water will help you make plenty of milk for your baby  It will also help prevent constipation  Drink a glass of water every time you breastfeed your baby  · Take vitamins as directed  Ask your healthcare provider what vitamins you need  · Limit caffeine and alcohol if you are breastfeeding  Caffeine and alcohol can get into your breast milk  Caffeine and alcohol can make your baby fussy  They can also interfere with your baby's sleep  Ask your healthcare provider if you can drink alcohol or caffeine  Rest and sleep: You may feel very tired in the postpartum period  Enough sleep will help you heal and give you energy to care for your baby  The following may help you get sleep and rest:  · Nap when your baby naps  Your baby may nap several times during the day  Get rest during this time  · Limit visitors  Many people may want to see you and your baby  Ask friends or family to visit on different days  This will give you time to rest      · Do not plan too much for one day  Put off household chores so that you have time to rest  Gradually do more each day  · Ask for help from family, friends, or neighbors    Ask them to help you with laundry, cleaning, or errands  Also ask someone to watch the baby while you take a nap or relax  Ask your partner to help with the care of your baby  Pump some of your breast milk so your partner can feed your baby during the night  Exercise after delivery:  Wait until your healthcare provider says it is okay to exercise  Exercise can help you lose weight, increase your energy levels, and manage your mood  It can also prevent constipation and blood clots  Start with gentle exercises such as walking  Do more as you have more energy  You may need to avoid abdominal exercises for 1 to 2 weeks after you deliver  Talk to your healthcare provider about an exercise plan that is right for you  Sexual activity after delivery:   · Do not have sex until your healthcare provider says it is okay  You may need to wait 4 to 6 weeks before you have sex  This may prevent infection and allow time to heal      · Your menstrual cycle may begin as soon as 3 weeks after you deliver  Your period may be delayed if you breastfeed your baby  You can become pregnant before you get your first postpartum period  Talk to your healthcare provider about birth control that is right for you  Some types of birth control are not safe during breastfeeding  For support and more information:  Join a support group for new mothers  Ask for help from family and friends with chores, errands, and care of your baby  · Office of Women's Health, [place] Department of Health and Human Services  [address], [address] [place] [address]  [address], [address] [place] [address]  Phone: 4- [phone]  Web Address: www womenshealth gov  · [facility]partum [address] , [address] [place] [address]  [address]  Web Address: [employer]  org/pregnancy/postpartum-care  aspx  Follow up with your obstetrician or midwife as directed:   You will need to follow up with your healthcare provider in 2 to 6 weeks after delivery  Write down your questions so you remember to ask them at your visits  © 2017 [address] Information is for End User's use only and may not be sold, redistributed or otherwise used for commercial purposes  All illustrations and images included in CareNotes® are the copyrighted property of A D A M , Inc  or Esequiel Sebastian  The above information is an  only  It is not intended as medical advice for individual conditions or treatments  Talk to your doctor, nurse or pharmacist before following any medical regimen to see if it is safe and effective for you

## 2019-09-11 VITALS
HEART RATE: 87 BPM | WEIGHT: 150 LBS | SYSTOLIC BLOOD PRESSURE: 130 MMHG | BODY MASS INDEX: 27.6 KG/M2 | DIASTOLIC BLOOD PRESSURE: 72 MMHG | RESPIRATION RATE: 17 BRPM | TEMPERATURE: 98 F | HEIGHT: 62 IN

## 2019-09-11 PROCEDURE — 99024 POSTOP FOLLOW-UP VISIT: CPT | Performed by: OBSTETRICS & GYNECOLOGY

## 2019-09-11 RX ORDER — IBUPROFEN 600 MG/1
600 TABLET ORAL EVERY 6 HOURS PRN
Qty: 30 TABLET | Refills: 0 | Status: SHIPPED | OUTPATIENT
Start: 2019-09-11 | End: 2021-01-07

## 2019-09-11 RX ORDER — DOCUSATE SODIUM 100 MG/1
100 CAPSULE, LIQUID FILLED ORAL 2 TIMES DAILY
Qty: 10 CAPSULE | Refills: 0 | Status: SHIPPED | OUTPATIENT
Start: 2019-09-11 | End: 2021-01-07

## 2019-09-11 RX ORDER — MEDROXYPROGESTERONE ACETATE 150 MG/ML
150 INJECTION, SUSPENSION INTRAMUSCULAR ONCE
Status: COMPLETED | OUTPATIENT
Start: 2019-09-11 | End: 2019-09-11

## 2019-09-11 RX ORDER — DIAPER,BRIEF,INFANT-TODD,DISP
1 EACH MISCELLANEOUS 4 TIMES DAILY PRN
Qty: 30 G | Refills: 0
Start: 2019-09-11 | End: 2021-01-07

## 2019-09-11 RX ORDER — ACETAMINOPHEN 325 MG/1
650 TABLET ORAL EVERY 6 HOURS PRN
Qty: 30 TABLET | Refills: 0
Start: 2019-09-11 | End: 2021-01-07

## 2019-09-11 RX ADMIN — MEDROXYPROGESTERONE ACETATE 150 MG: 150 INJECTION, SUSPENSION, EXTENDED RELEASE INTRAMUSCULAR at 06:30

## 2019-09-11 RX ADMIN — OXYCODONE HYDROCHLORIDE AND ACETAMINOPHEN 1 TABLET: 5; 325 TABLET ORAL at 01:27

## 2019-09-11 RX ADMIN — DOCUSATE SODIUM 100 MG: 100 CAPSULE, LIQUID FILLED ORAL at 08:00

## 2019-09-11 RX ADMIN — IBUPROFEN 600 MG: 600 TABLET ORAL at 08:00

## 2019-09-11 NOTE — SOCIAL WORK
CM received a consult for Hx of THC use - reviewed UDS for mom and infant with are both negative on admission  MOB is Grant Yu  FOB is Madi Oliver  Infant is Jaqui Mei  Parents are in a relationship and live together with FOBs mother and his brothers  Their address: [address] [place] [address], [address]    Parents report having all necessary items for the infant at discharge  Report a good support system with family and friends  MOB is formula feeding and has [facility]  Notified her of what documents to bring to her [facility] appt  MOB is an  (heike etc )  FOB is currently unemployed  MOB plans to stay home with the infant  MOB has a license, but does not have a vehicle  They rely on friends to assist with transport  Plans to use Dr Rehana Stern in [place] pass for ped needs  [facility] provided through [employer] reports no MH history  No hx of incarceration    Hx of THC use - we discussed last use was [facility]  Discussed that if infants chord tox comes back + for THC that CYS will be notified, however, no intervention at this time  No hx of CYS or any other community supports  No concerns identified

## 2019-09-11 NOTE — PROGRESS NOTES
Progress Note - OB/GYN   Veronica Ruff 26 y o  female MRN: [id number]  Unit/Bed#: L&D 314-01 Encounter: [id number]    Assessment:  PPD#1 s/p Spontaneous Vaginal Delivery, stable    Plan:  1  Post partum   - Continue routine post partum care  - Encourage ambulation  - Encourage breastfeeding  - Case management consult pending, pt agreeable    2  Discharge planning  - Contraception: wants depo  - Anticipate discharge tomorrow        Subjective/Objective   Chief Complaint:   PPD#1 s/p Spontaneous Vaginal Delivery  Patient doing well  Lochia WNL  Pain well controlled  Subjective: This morning, she has no major complaints  Pain: cramping, responding to PO meds  Tolerating PO: yes  Voiding: yes  Flatus: yes  BM: no  Ambulating: yes  Breastfeeding: Breastfeeding  Chest pain: no  Shortness of breath: no  Leg pain: no  Lochia: WNL    Objective:     Vitals: /72 (BP Location: Left arm)   Pulse 102   Temp 98 2 °F (36 8 °C) (Oral)   Resp 18   Ht 5' 2" (1 575 m)   Wt 68 kg (150 lb)   LMP 11/21/2018 (LMP Unknown)   Breastfeeding? No   BMI 27 44 kg/m²     I/O       09/09 0701 - 09/10 0700 09/10 0701 - 09/11 0700    Urine (mL/kg/hr) 300 1000 (0 6)    Blood 439     Total Output 739 1000    Net -739 -1000                Lab Results   Component Value Date    WBC 8 41 09/09/2019    HGB 10 1 (L) 09/09/2019    HCT 32 8 (L) 09/09/2019    MCV 83 09/09/2019     09/09/2019       Physical Exam:   Physical Exam   Constitutional: She is oriented to person, place, and time  She appears well-developed and well-nourished  No distress  HENT:   Head: Normocephalic and atraumatic  Eyes: Conjunctivae are normal    Cardiovascular: Normal rate, regular rhythm and normal heart sounds  Pulmonary/Chest: Effort normal and breath sounds normal    Abdominal: Soft  She exhibits no distension  There is no tenderness (uterus firm below the umbilicus)  Musculoskeletal: Normal range of motion     Neurological: She is alert and oriented to person, place, and time  Skin: Skin is warm and dry         Sary Rosales MD  PGY-1, OB/GYN  9/11/2019 5:20 AM

## 2019-09-12 NOTE — UTILIZATION REVIEW
Notification of Maternity Inpatient Admission/Maternity Inpatient Authorization Request  This is a Notification of Maternity Inpatient Admission/Maternity Inpatient Authorization Request to our facility [facility]  Please be advised that this patient is currently in our facility under Inpatient Status  Below you will find the Birth/[place] Summary, Attending Physician and Facilitys information including NPI#  and contact information for the Utilization Review Department where the patient is receiving care services  Facility: [address] [place] [address]  Address: Debbie Metcalf, [address]  Phone: [phone] Tax ID: [id number]  NPI: [id number]  Medicare ID: [id number]    Place of Service Code: 24   Place of Service Name: Inpatient Hospital  Presentation Date & Time: 2019  8:16 PM  Inpatient Admission Date & Time: 19 2016  Discharge Date & Time: 2019  9:15 PM   Discharge Disposition (if discharged): Home/Self Care  Attending Physician & NPI: Naresh Cooney Md [[id number]]    Mother of [place] Information: Jasper Woodall   MRN: [id number] YOB: 1995   Mother's Admitting Diagnosis: Encounter for full-term uncomplicated delivery [X62]  Estimated Date of Delivery: 19  Type of Delivery: Vaginal, Spontaneous    Delivering clinician: Naresh Cooney   OB History        2    Para   2    Term   2       0    AB   0    Living   2       SAB   0    TAB   0    Ectopic   0    Multiple   0    Live Births   2                Name & MRN:   Information for the patient's :  Yaritza Ponce [[id number]]      Delivery Information:  Sex: male  Delivered 9/10/2019 6:03 AM by Vaginal, Spontaneous; Gestational Age: 40w1d    [place] Measurements:  Weight: 8 lb 9 oz (3884 g);   Height: 21 25"    APGAR 1 minute 5 minutes 10 minutes   Totals: 7 9      Thank you,  [address] Utilization Review Department  Phone: Geni Paul - [phone]; Fax [phone]    Send all requests for admission clinical reviews, approved or denied determinations and any other requests to fax [phone]   All voicemails are confidential

## 2019-09-18 LAB — PLACENTA IN STORAGE: NORMAL

## 2021-01-07 ENCOUNTER — HOSPITAL ENCOUNTER (EMERGENCY)
Facility: HOSPITAL | Age: 26
Discharge: HOME | End: 2021-01-07
Attending: EMERGENCY MEDICINE
Payer: COMMERCIAL

## 2021-01-07 VITALS
SYSTOLIC BLOOD PRESSURE: 122 MMHG | RESPIRATION RATE: 18 BRPM | HEIGHT: 63 IN | HEART RATE: 80 BPM | OXYGEN SATURATION: 98 % | WEIGHT: 183.64 LBS | BODY MASS INDEX: 32.54 KG/M2 | DIASTOLIC BLOOD PRESSURE: 74 MMHG | TEMPERATURE: 97.6 F

## 2021-01-07 DIAGNOSIS — R05.9 COUGH: Primary | ICD-10-CM

## 2021-01-07 DIAGNOSIS — Z01.89 ENCOUNTER FOR LABORATORY TEST: ICD-10-CM

## 2021-01-07 PROCEDURE — U0005 INFEC AGEN DETEC AMPLI PROBE: HCPCS | Performed by: PHYSICIAN ASSISTANT

## 2021-01-07 PROCEDURE — 99284 EMERGENCY DEPT VISIT MOD MDM: CPT | Performed by: PHYSICIAN ASSISTANT

## 2021-01-07 PROCEDURE — U0003 INFECTIOUS AGENT DETECTION BY NUCLEIC ACID (DNA OR RNA); SEVERE ACUTE RESPIRATORY SYNDROME CORONAVIRUS 2 (SARS-COV-2) (CORONAVIRUS DISEASE [COVID-19]), AMPLIFIED PROBE TECHNIQUE, MAKING USE OF HIGH THROUGHPUT TECHNOLOGIES AS DESCRIBED BY CMS-2020-01-R: HCPCS | Performed by: PHYSICIAN ASSISTANT

## 2021-01-07 PROCEDURE — 99282 EMERGENCY DEPT VISIT SF MDM: CPT

## 2021-01-07 RX ORDER — ALBUTEROL SULFATE 90 UG/1
1-2 AEROSOL, METERED RESPIRATORY (INHALATION) EVERY 4 HOURS PRN
Qty: 1 INHALER | Refills: 0 | Status: SHIPPED | OUTPATIENT
Start: 2021-01-07

## 2021-01-07 NOTE — Clinical Note
Damon Moon was seen and treated in our emergency department on 1/7/2021  Diagnosis: covid testing pending    Robert Paul    She may return on this date: If you have any questions or concerns, please don't hesitate to call        Durrell Burkitt, PA-C    ______________________________           _______________          _______________  Hospital Representative                              Date                                Time

## 2021-01-07 NOTE — Clinical Note
Herman Cook was seen and treated in our emergency department on 1/7/2021  Diagnosis: Amber Jackson  may return to work on return date  She may return on this date: 01/11/2021    Covid negative     If you have any questions or concerns, please don't hesitate to call        Andrés Burris PA-C    ______________________________           _______________          _______________  Hospital Representative                              Date                                Time

## 2021-01-07 NOTE — ED PROVIDER NOTES
History  Chief Complaint   Patient presents with    Labs Only     pt says work sent here here to be tested after a coworkers daughter tested positive  Complains intermittent SOB, and a cough  She states " she would only like a covid swab today "     23year old female presents ambulatory from home requesting covid testing  She reports that yesterday she began with a cough and was feeling short of breath with exertion  She notes she was exposed to a co-worker who's daughter tested positive  Denies fever, chills  Denies congestion, ear pain, sore throat  Denies chest pain  Denies N/V/D, abdominal pain  She notes her employer advised her to get tested  PMH includes asthma  She notes she is currently out of her albuterol inhaler  History provided by:  Patient   used: No        Prior to Admission Medications   Prescriptions Last Dose Informant Patient Reported? Taking? albuterol (VENTOLIN HFA) 90 mcg/act inhaler  Self Yes Yes   Sig: Inhale 1-2 puffs   albuterol (Ventolin HFA) 90 mcg/act inhaler   No No   Sig: Inhale 1-2 puffs every 4 (four) hours as needed for wheezing or shortness of breath      Facility-Administered Medications: None       Past Medical History:   Diagnosis Date    Anemia     Asthma     Substance abuse ([facility] [address] )     marijuana use    Varicella     immunized       History reviewed  No pertinent surgical history  Family History   Problem Relation Age of Onset    Heart disease Mother      I have reviewed and agree with the history as documented  E-Cigarette/Vaping    E-Cigarette Use Current Some Day User      E-Cigarette/Vaping Substances    THC Yes      Social History     Tobacco Use    Smoking status: Never Smoker    Smokeless tobacco: Never Used   Substance Use Topics    Alcohol use: No    Drug use: Not Currently     Comment: alast use at beginning of pregnancy then stopped using       Review of Systems   Constitutional: Positive for fatigue   Negative for chills and fever  HENT: Negative  Negative for congestion, rhinorrhea and sore throat  Eyes: Negative  Negative for visual disturbance  Respiratory: Positive for cough and shortness of breath  Negative for wheezing  Cardiovascular: Negative  Negative for chest pain, palpitations and leg swelling  Gastrointestinal: Negative  Negative for abdominal pain, constipation, diarrhea, nausea and vomiting  Genitourinary: Negative  Negative for dysuria, flank pain, frequency and hematuria  Musculoskeletal: Negative  Negative for back pain and myalgias  Skin: Negative  Negative for rash  Neurological: Negative  Negative for dizziness, light-headedness and headaches  Psychiatric/Behavioral: Negative  Negative for confusion  All other systems reviewed and are negative  Physical Exam  Physical Exam  Vitals signs and nursing note reviewed  Constitutional:       General: She is not in acute distress  Appearance: She is well-developed  She is not toxic-appearing  HENT:      Head: Normocephalic and atraumatic  Right Ear: Hearing, tympanic membrane, ear canal and external ear normal       Left Ear: Hearing, tympanic membrane, ear canal and external ear normal       Nose: Nose normal       Mouth/Throat:      Mouth: Mucous membranes are moist       Pharynx: Oropharynx is clear  Uvula midline  No oropharyngeal exudate  Eyes:      General: No scleral icterus  Extraocular Movements: Extraocular movements intact  Conjunctiva/sclera: Conjunctivae normal       Pupils: Pupils are equal, round, and reactive to light  Neck:      Musculoskeletal: Normal range of motion and neck supple  Trachea: Trachea and phonation normal  No tracheal deviation  Cardiovascular:      Rate and Rhythm: Normal rate and regular rhythm  Pulses: Normal pulses  Heart sounds: Normal heart sounds, S1 normal and S2 normal  No murmur     Pulmonary:      Effort: Pulmonary effort is normal  No respiratory distress  Breath sounds: Normal breath sounds  No wheezing, rhonchi or rales  Abdominal:      General: Bowel sounds are normal  There is no distension  Palpations: Abdomen is soft  Tenderness: There is no abdominal tenderness  There is no guarding or rebound  Musculoskeletal: Normal range of motion  General: No tenderness  Right lower leg: No edema  Left lower leg: No edema  Skin:     General: Skin is warm and dry  Capillary Refill: Capillary refill takes less than 2 seconds  Findings: No rash  Neurological:      General: No focal deficit present  Mental Status: She is alert and oriented to person, place, and time  GCS: GCS eye subscore is 4  GCS verbal subscore is 5  GCS motor subscore is 6  Cranial Nerves: No cranial nerve deficit  Sensory: No sensory deficit  Motor: No abnormal muscle tone  Gait: Gait normal    Psychiatric:         Mood and Affect: Mood normal          Speech: Speech normal          Behavior: Behavior normal          Vital Signs  ED Triage Vitals [01/07/21 1353]   Temperature Pulse Respirations Blood Pressure SpO2   97 6 °F (36 4 °C) 80 18 122/74 98 %      Temp Source Heart Rate Source Patient Position - Orthostatic VS BP Location FiO2 (%)   Temporal Monitor Sitting Left arm --      Pain Score       --           Vitals:    01/07/21 1353   BP: 122/74   Pulse: 80   Patient Position - Orthostatic VS: Sitting         Visual Acuity      ED Medications  Medications - No data to display    Diagnostic Studies  Results Reviewed     Procedure Component Value Units Date/Time    Novel Coronavirus (COVID-19), PCR [facility] [[id number]] Collected: 01/07/21 1423    Lab Status: In process Specimen: Nasopharyngeal Swab Updated: 01/07/21 1434                 No orders to display              Procedures  Procedures         ED Course       Pt afebrile, nontoxic appearing  Symptoms felt to be viral in nature    Appropriate for discharge with continued symptomatic treatment  Instructed to quarantine at home, continue social distancing, use of a mask, hand hygiene, etc   Will call with covid results  Discussed continued symptomatic/supportive care  Advised rest, fluids, OTC meds as needed for symptoms  Recommended vitamin C, vitamin D and multivitamin  Refill on albuterol inhaler was given  Strict return precautions outlined  Note for work provided  Advised outpatient follow up with PCP or return to ER for change in condition as outlined  Pt verbalized understanding and had no further questions  MDM  Number of Diagnoses or Management Options  Cough: new and requires workup  Encounter for laboratory test: new and requires workup     Amount and/or Complexity of Data Reviewed  Clinical lab tests: ordered  Decide to obtain previous medical records or to obtain history from someone other than the patient: yes  Obtain history from someone other than the patient: yes  Review and summarize past medical records: yes    Patient Progress  Patient progress: improved      Disposition  Final diagnoses:   Cough   Encounter for laboratory test     Time reflects when diagnosis was documented in both MDM as applicable and the Disposition within this note     Time User Action Codes Description Comment    1/7/2021  2:21 PM Aretha Hernández [R05] Cough     1/7/2021  2:21 PM Aretha Faustin Add [Z01 89] Encounter for laboratory test       ED Disposition     ED Disposition Condition Date/Time Comment    Discharge Stable Thu Jan 7, 2021  2:21 PM [address] discharge to home/self care              Follow-up Information     Follow up With Specialties Details Why Contact Info Additional Information    [facility] Emergency Department Emergency Medicine  As needed [address] [facility] ED, [address], [address] Discharge Medication List as of 1/7/2021  2:23 PM      CONTINUE these medications which have CHANGED    Details   albuterol (Ventolin HFA) 90 mcg/act inhaler Inhale 1-2 puffs every 4 (four) hours as needed for wheezing or shortness of breath, Starting Thu 1/7/2021, Normal           No discharge procedures on file      PDMP Review     None          ED Provider  Electronically Signed by           Yane Grey PA-C  01/07/21 8871

## 2021-01-07 NOTE — DISCHARGE INSTRUCTIONS
We will call you with covid results  Continue to quarantine at home  Rest, plenty of fluids  Continue OTC medications as needed for symptoms  Albuterol inhaler as needed  Vitamin C, vitamin D and multivitamin daily  Follow up with PCP or return to ER as needed

## 2021-01-08 ENCOUNTER — PATIENT OUTREACH (OUTPATIENT)
Dept: CASE MANAGEMENT | Facility: HOSPITAL | Age: 26
End: 2021-01-08

## 2021-01-08 LAB — SARS-COV-2 RNA SPEC QL NAA+PROBE: NOT DETECTED

## 2021-06-07 ENCOUNTER — HOSPITAL ENCOUNTER (EMERGENCY)
Facility: HOSPITAL | Age: 26
Discharge: HOME | End: 2021-06-07
Attending: EMERGENCY MEDICINE
Payer: COMMERCIAL

## 2021-06-07 ENCOUNTER — APPOINTMENT (EMERGENCY)
Dept: ULTRASOUND IMAGING | Facility: HOSPITAL | Age: 26
End: 2021-06-07
Payer: COMMERCIAL

## 2021-06-07 VITALS
SYSTOLIC BLOOD PRESSURE: 111 MMHG | OXYGEN SATURATION: 98 % | HEART RATE: 89 BPM | BODY MASS INDEX: 35.62 KG/M2 | WEIGHT: 201.06 LBS | RESPIRATION RATE: 16 BRPM | DIASTOLIC BLOOD PRESSURE: 66 MMHG | HEIGHT: 63 IN | TEMPERATURE: 98.3 F

## 2021-06-07 DIAGNOSIS — E34.9 ELEVATED SERUM HCG: ICD-10-CM

## 2021-06-07 DIAGNOSIS — N93.9 VAGINAL BLEEDING: Primary | ICD-10-CM

## 2021-06-07 LAB
ABO GROUP BLD: NORMAL
ANION GAP SERPL CALCULATED.3IONS-SCNC: 7 MMOL/L (ref 4–13)
B-HCG SERPL-ACNC: 10.3 MIU/ML
BACTERIA UR QL AUTO: ABNORMAL /HPF
BASOPHILS # BLD AUTO: 0.02 THOUSANDS/ΜL (ref 0–0.1)
BASOPHILS NFR BLD AUTO: 0 % (ref 0–1)
BILIRUB UR QL STRIP: NEGATIVE
BLD GP AB SCN SERPL QL: NEGATIVE
BUN SERPL-MCNC: 8 MG/DL (ref 5–25)
CALCIUM SERPL-MCNC: 8.7 MG/DL (ref 8.3–10.1)
CHLORIDE SERPL-SCNC: 108 MMOL/L (ref 100–108)
CLARITY UR: ABNORMAL
CO2 SERPL-SCNC: 28 MMOL/L (ref 21–32)
COLOR UR: ABNORMAL
CREAT SERPL-MCNC: 0.61 MG/DL (ref 0.6–1.3)
EOSINOPHIL # BLD AUTO: 0.06 THOUSAND/ΜL (ref 0–0.61)
EOSINOPHIL NFR BLD AUTO: 1 % (ref 0–6)
ERYTHROCYTE [DISTWIDTH] IN BLOOD BY AUTOMATED COUNT: 13.4 % (ref 11.6–15.1)
EXT PREG TEST URINE: NEGATIVE
EXT. CONTROL ED NAV: NORMAL
GFR SERPL CREATININE-BSD FRML MDRD: 128 ML/MIN/1.73SQ M
GLUCOSE SERPL-MCNC: 90 MG/DL (ref 65–140)
GLUCOSE UR STRIP-MCNC: NEGATIVE MG/DL
HCT VFR BLD AUTO: 37.6 % (ref 34.8–46.1)
HGB BLD-MCNC: 11.9 G/DL (ref 11.5–15.4)
HGB UR QL STRIP.AUTO: ABNORMAL
IMM GRANULOCYTES # BLD AUTO: 0.02 THOUSAND/UL (ref 0–0.2)
IMM GRANULOCYTES NFR BLD AUTO: 0 % (ref 0–2)
KETONES UR STRIP-MCNC: NEGATIVE MG/DL
LEUKOCYTE ESTERASE UR QL STRIP: NEGATIVE
LYMPHOCYTES # BLD AUTO: 1.87 THOUSANDS/ΜL (ref 0.6–4.47)
LYMPHOCYTES NFR BLD AUTO: 31 % (ref 14–44)
MCH RBC QN AUTO: 26.9 PG (ref 26.8–34.3)
MCHC RBC AUTO-ENTMCNC: 31.6 G/DL (ref 31.4–37.4)
MCV RBC AUTO: 85 FL (ref 82–98)
MONOCYTES # BLD AUTO: 0.39 THOUSAND/ΜL (ref 0.17–1.22)
MONOCYTES NFR BLD AUTO: 6 % (ref 4–12)
NEUTROPHILS # BLD AUTO: 3.76 THOUSANDS/ΜL (ref 1.85–7.62)
NEUTS SEG NFR BLD AUTO: 62 % (ref 43–75)
NITRITE UR QL STRIP: NEGATIVE
NON-SQ EPI CELLS URNS QL MICRO: ABNORMAL /HPF
NRBC BLD AUTO-RTO: 0 /100 WBCS
PH UR STRIP.AUTO: 8.5 [PH]
PLATELET # BLD AUTO: 296 THOUSANDS/UL (ref 149–390)
PMV BLD AUTO: 9.5 FL (ref 8.9–12.7)
POTASSIUM SERPL-SCNC: 3.8 MMOL/L (ref 3.5–5.3)
PROT UR STRIP-MCNC: ABNORMAL MG/DL
RBC # BLD AUTO: 4.43 MILLION/UL (ref 3.81–5.12)
RBC #/AREA URNS AUTO: ABNORMAL /HPF
RH BLD: POSITIVE
SODIUM SERPL-SCNC: 143 MMOL/L (ref 136–145)
SP GR UR STRIP.AUTO: 1.01 (ref 1–1.03)
SPECIMEN EXPIRATION DATE: NORMAL
UROBILINOGEN UR QL STRIP.AUTO: 1 E.U./DL
WBC # BLD AUTO: 6.12 THOUSAND/UL (ref 4.31–10.16)
WBC #/AREA URNS AUTO: ABNORMAL /HPF

## 2021-06-07 PROCEDURE — 81025 URINE PREGNANCY TEST: CPT | Performed by: EMERGENCY MEDICINE

## 2021-06-07 PROCEDURE — 76815 OB US LIMITED FETUS(S): CPT

## 2021-06-07 PROCEDURE — 96374 THER/PROPH/DIAG INJ IV PUSH: CPT

## 2021-06-07 PROCEDURE — 36415 COLL VENOUS BLD VENIPUNCTURE: CPT | Performed by: PHYSICIAN ASSISTANT

## 2021-06-07 PROCEDURE — 99284 EMERGENCY DEPT VISIT MOD MDM: CPT

## 2021-06-07 PROCEDURE — 81001 URINALYSIS AUTO W/SCOPE: CPT | Performed by: EMERGENCY MEDICINE

## 2021-06-07 PROCEDURE — 87086 URINE CULTURE/COLONY COUNT: CPT | Performed by: EMERGENCY MEDICINE

## 2021-06-07 PROCEDURE — 86901 BLOOD TYPING SEROLOGIC RH(D): CPT | Performed by: PHYSICIAN ASSISTANT

## 2021-06-07 PROCEDURE — 84702 CHORIONIC GONADOTROPIN TEST: CPT | Performed by: PHYSICIAN ASSISTANT

## 2021-06-07 PROCEDURE — 86900 BLOOD TYPING SEROLOGIC ABO: CPT | Performed by: PHYSICIAN ASSISTANT

## 2021-06-07 PROCEDURE — 99284 EMERGENCY DEPT VISIT MOD MDM: CPT | Performed by: PHYSICIAN ASSISTANT

## 2021-06-07 PROCEDURE — 80048 BASIC METABOLIC PNL TOTAL CA: CPT | Performed by: PHYSICIAN ASSISTANT

## 2021-06-07 PROCEDURE — 85025 COMPLETE CBC W/AUTO DIFF WBC: CPT | Performed by: PHYSICIAN ASSISTANT

## 2021-06-07 PROCEDURE — 86850 RBC ANTIBODY SCREEN: CPT | Performed by: PHYSICIAN ASSISTANT

## 2021-06-07 RX ORDER — ONDANSETRON 2 MG/ML
4 INJECTION INTRAMUSCULAR; INTRAVENOUS ONCE
Status: COMPLETED | OUTPATIENT
Start: 2021-06-07 | End: 2021-06-07

## 2021-06-07 RX ADMIN — ONDANSETRON 4 MG: 2 INJECTION INTRAMUSCULAR; INTRAVENOUS at 13:41

## 2021-06-07 NOTE — Clinical Note
Cherie Sifuentes was seen and treated in our emergency department on 6/7/2021  Diagnosis:     Brock Belle  may return to work on return date  She may return on this date: 06/08/2021         If you have any questions or concerns, please don't hesitate to call        Umer Morel PA-C    ______________________________           _______________          _______________  Hospital Representative                              Date                                Time

## 2021-06-07 NOTE — Clinical Note
accompanied Vandana Hathaway to the emergency department on 6/7/2021  Return date if applicable: 90/75/3138    Barbara Leggett    If you have any questions or concerns, please don't hesitate to call        Klarissa August PA-C

## 2021-06-07 NOTE — ED PROVIDER NOTES
History  Chief Complaint   Patient presents with    Vaginal Bleeding     patient reports four positive pregnancy tests and that she began bleeding last night with cramping  she also reports clots are present  hx of miscarrages in the family and one personal       Patient is a 22 y/o female presenting to the ED for evaluation of vaginal bleeding and lower abdominal cramping  Patient started with light vaginal bleeding last night with passage of a few small clots  She is going through 1 maxi pad every 3-4 hours and is not soaking them  She also reports nausea and bilateral lower abdominal cramping that feels like a menstrual cramp  Patient says her period was late which prompted her to take a home pregnancy test about a week ago and they were positive x4  LMP was 4/28  She has 2 living children, both were full-term vaginal deliveries without complication  She does report a family hx of miscarriages but denies every having one personally  She denies fevers, chills, chest pain, SOB, abdominal pain, vomiting, diarrhea, constipation or urinary symptoms  Prior to Admission Medications   Prescriptions Last Dose Informant Patient Reported? Taking? albuterol (Ventolin HFA) 90 mcg/act inhaler   No No   Sig: Inhale 1-2 puffs every 4 (four) hours as needed for wheezing or shortness of breath      Facility-Administered Medications: None       Past Medical History:   Diagnosis Date    Anemia     Asthma     Substance abuse ([facility] [address] )     marijuana use    Varicella     immunized       History reviewed  No pertinent surgical history  Family History   Problem Relation Age of Onset    Heart disease Mother      I have reviewed and agree with the history as documented  E-Cigarette/Vaping    E-Cigarette Use Current Some Day User      E-Cigarette/Vaping Substances    THC Yes      Social History     Tobacco Use    Smoking status: Never Smoker    Smokeless tobacco: Never Used   Substance Use Topics    Alcohol use:  No  Drug use: Not Currently     Types: Marijuana     Comment: alast use at beginning of pregnancy then stopped using       Review of Systems   Constitutional: Negative for appetite change, chills, fatigue and fever  HENT: Negative for congestion, rhinorrhea, sinus pressure, sinus pain and sore throat  Eyes: Negative for photophobia and visual disturbance  Respiratory: Negative for cough, shortness of breath and wheezing  Cardiovascular: Negative for chest pain, palpitations and leg swelling  Gastrointestinal: Positive for abdominal pain and nausea  Negative for blood in stool, constipation, diarrhea and vomiting  Genitourinary: Positive for vaginal bleeding  Negative for difficulty urinating, dysuria, flank pain, frequency, hematuria and urgency  Musculoskeletal: Negative for arthralgias, back pain, joint swelling, myalgias and neck pain  Skin: Negative for color change, pallor and rash  Neurological: Negative for dizziness, syncope, weakness, light-headedness and headaches  Psychiatric/Behavioral: Negative for confusion and sleep disturbance  All other systems reviewed and are negative  Physical Exam  Physical Exam  Vitals signs and nursing note reviewed  Constitutional:       General: She is awake  Appearance: Normal appearance  She is well-developed  She is not toxic-appearing or diaphoretic  HENT:      Head: Normocephalic and atraumatic  Right Ear: External ear normal       Left Ear: External ear normal       Nose: Nose normal       Mouth/Throat:      Lips: Pink  Mouth: Mucous membranes are moist    Eyes:      General: Lids are normal  No scleral icterus  Conjunctiva/sclera: Conjunctivae normal       Pupils: Pupils are equal, round, and reactive to light  Neck:      Musculoskeletal: Full passive range of motion without pain and neck supple  No injury or pain with movement  Cardiovascular:      Rate and Rhythm: Normal rate and regular rhythm        Pulses: Normal pulses  Radial pulses are 2+ on the right side and 2+ on the left side  Heart sounds: Normal heart sounds, S1 normal and S2 normal    Pulmonary:      Effort: Pulmonary effort is normal  No accessory muscle usage  Breath sounds: Normal breath sounds  No stridor  No decreased breath sounds, wheezing, rhonchi or rales  Abdominal:      General: Abdomen is flat  Bowel sounds are normal  There is no distension  Palpations: Abdomen is soft  Tenderness: There is abdominal tenderness in the right lower quadrant, suprapubic area and left lower quadrant  There is no right CVA tenderness, left CVA tenderness, guarding or rebound  Comments: Mild tenderness to palpation in bilateral lower quadrants, primarily suprapubic  No rebound or guarding  Musculoskeletal:      Right lower leg: No edema  Left lower leg: No edema  Lymphadenopathy:      Cervical: No cervical adenopathy  Skin:     General: Skin is warm and dry  Capillary Refill: Capillary refill takes less than 2 seconds  Coloration: Skin is not cyanotic, jaundiced or pale  Neurological:      Mental Status: She is alert and oriented to person, place, and time  GCS: GCS eye subscore is 4  GCS verbal subscore is 5  GCS motor subscore is 6  Gait: Gait normal    Psychiatric:         Mood and Affect: Mood normal          Speech: Speech normal          Behavior: Behavior is cooperative           Vital Signs  ED Triage Vitals [06/07/21 1159]   Temperature Pulse Respirations Blood Pressure SpO2   98 3 °F (36 8 °C) 71 16 136/79 97 %      Temp Source Heart Rate Source Patient Position - Orthostatic VS BP Location FiO2 (%)   Temporal Monitor Sitting Right arm --      Pain Score       --           Vitals:    06/07/21 1600 06/07/21 1630 06/07/21 1700 06/07/21 1730   BP: 114/79 114/69 121/72 111/66   Pulse: 74 87 73 89   Patient Position - Orthostatic VS: Sitting Sitting Sitting Sitting         Visual Acuity      ED Medications  Medications   ondansetron (ZOFRAN) injection 4 mg (4 mg Intravenous Given 6/7/21 1341)       Diagnostic Studies  Results Reviewed     Procedure Component Value Units Date/Time    Urine culture [[id number]] Collected: 06/07/21 1207    Lab Status: Final result Specimen: Urine, Clean Catch Updated: 06/08/21 1003     Urine Culture No Growth <1000 cfu/mL    Quantitative hCG [[id number]]  (Abnormal) Collected: 06/07/21 1340    Lab Status: Final result Specimen: Blood from Arm, Right Updated: 06/07/21 1501     HCG, Quant 10 3 mIU/mL     Narrative:       Expected Ranges:     Approximate               Approximate HCG  Gestation age          Concentration ( mIU/mL)  _____________          ______________________   Reedmariana Llamas                      HCG values  0 2-1                       5-50  1-2                           2-3                         100-5000  3-4                         500-95699  4-5                         1000-27607  5-6                         [id number]  6-8                         14539-108904  8-12                        [id number]      Basic metabolic panel [[id number]] Collected: 06/07/21 1340    Lab Status: Final result Specimen: Blood from Arm, Right Updated: 06/07/21 1426     Sodium 143 mmol/L      Potassium 3 8 mmol/L      Chloride 108 mmol/L      CO2 28 mmol/L      ANION GAP 7 mmol/L      BUN 8 mg/dL      Creatinine 0 61 mg/dL      Glucose 90 mg/dL      Calcium 8 7 mg/dL      eGFR 128 ml/min/1 73sq m     Narrative:      [place] guidelines for Chronic Kidney Disease (CKD):     Stage 1 with normal or high GFR (GFR > 90 mL/min/1 73 square meters)    Stage 2 Mild CKD (GFR = 60-89 mL/min/1 73 square meters)    Stage 3A Moderate CKD (GFR = 45-59 mL/min/1 73 square meters)    Stage 3B Moderate CKD (GFR = 30-44 mL/min/1 73 square meters)    Stage 4 Severe CKD (GFR = 15-29 mL/min/1 73 square meters)    Stage 5 End Stage CKD (GFR <15 mL/min/1 73 square meters)  Note: GFR calculation is accurate only with a steady state creatinine    CBC and differential [[id number]] Collected: 06/07/21 1340    Lab Status: Final result Specimen: Blood from Arm, Right Updated: 06/07/21 1349     WBC 6 12 Thousand/uL      RBC 4 43 Million/uL      Hemoglobin 11 9 g/dL      Hematocrit 37 6 %      MCV 85 fL      MCH 26 9 pg      MCHC 31 6 g/dL      RDW 13 4 %      MPV 9 5 fL      Platelets 016 Thousands/uL      nRBC 0 /100 WBCs      Neutrophils Relative 62 %      Immat GRANS % 0 %      Lymphocytes Relative 31 %      Monocytes Relative 6 %      Eosinophils Relative 1 %      Basophils Relative 0 %      Neutrophils Absolute 3 76 Thousands/µL      Immature Grans Absolute 0 02 Thousand/uL      Lymphocytes Absolute 1 87 Thousands/µL      Monocytes Absolute 0 39 Thousand/µL      Eosinophils Absolute 0 06 Thousand/µL      Basophils Absolute 0 02 Thousands/µL     Urine Microscopic [[id number]]  (Abnormal) Collected: 06/07/21 1207    Lab Status: Final result Specimen: Urine, Clean Catch Updated: 06/07/21 1227     RBC, UA Innumerable /hpf      WBC, UA 2-4 /hpf      Epithelial Cells Occasional /hpf      Bacteria, UA Occasional /hpf      URINE COMMENT --    UA w Reflex to Microscopic w Reflex to Culture [[id number]]  (Abnormal) Collected: 06/07/21 1207    Lab Status: Final result Specimen: Urine, Clean Catch Updated: 06/07/21 1213     Color, UA Red     Clarity, UA Cloudy     Specific Gravity, UA 1 015     pH, UA 8 5     Leukocytes, UA Negative     Nitrite, UA Negative     Protein,  (2+) mg/dl      Glucose, UA Negative mg/dl      Ketones, UA Negative mg/dl      Urobilinogen, UA 1 0 E U /dl      Bilirubin, UA Negative     Blood, UA Large     URINE COMMENT --    POCT pregnancy, urine [[id number]]  (Normal) Resulted: 06/07/21 1209    Lab Status: Final result Updated: 06/07/21 1209     EXT PREG TEST UR (Ref: Negative) negative     Control valid                 US OB pregnancy limited with transvaginal Final Result by Olga Umana MD ([id number])      No evidence of intrauterine pregnancy  Differential remains early IUP, spontaneous  and ectopic pregnancy  Short-term follow-up with serial beta-hCG and pelvic/OB ultrasound recommended in 2 weeks  Workstation performed: [id number]                    Procedures  Procedures         ED Course  ED Course as of  1343   Mon 2021   1628 HCG QUANTITATIVE(!): 10 3   1628 No evidence of intrauterine pregnancy  Differential remains early IUP, spontaneous  and ectopic pregnancy  Short-term follow-up with serial beta-hCG and pelvic/OB ultrasound recommended in 2 weeks  SBIRT 20yo+      Most Recent Value   SBIRT (24 yo +)   In order to provide better care to our patients, we are screening all of our patients for alcohol and drug use  Would it be okay to ask you these screening questions? Yes Filed at: 2021 1210   Initial Alcohol Screen: US AUDIT-C    1  How often do you have a drink containing alcohol?  0 Filed at: 2021 1210   2  How many drinks containing alcohol do you have on a typical day you are drinking? 0 Filed at: 2021 1210   3a  Male UNDER 65: How often do you have five or more drinks on one occasion? 0 Filed at: 2021 1210   3b  FEMALE Any Age, or MALE 65+: How often do you have 4 or more drinks on one occassion? 0 Filed at: 2021 1210   Audit-C Score  0 Filed at: 2021 1210   SABINA: How many times in the past year have you    Used an illegal drug or used a prescription medication for non-medical reasons? Never Filed at: 2021 1210                    MDM  Number of Diagnoses or Management Options  Elevated serum hCG:   Vaginal bleeding:   Diagnosis management comments: Patient is a 22 y/o female presenting to the ED for evaluation of vaginal bleeding and lower abdominal cramping    Quantitative hCG 10; Pelvic U/S showed no evidence of intrauterine pregnancy  Differential remains early IUP, spontaneous  and ectopic pregnancy  Short-term follow-up with serial beta-hCG and pelvic/OB ultrasound recommended in 2 weeks  Patient reports small to moderate amount of bleeding and is not soaking pads  Hgb stable  Rh positive  Advised patient to follow-up with OB and repeat hCG ordered for patient to have drawn in 48-72 hours  Strict ED return precautions discussed in detail  The management plan was discussed in detail with the patient at bedside and all questions were answered  Prior to discharge, verbal and written instructions provided  The patient verbalized understanding of our discussion and plan of care, and agrees to return to the Emergency Department for concerns and progression of illness  Amount and/or Complexity of Data Reviewed  Clinical lab tests: ordered and reviewed  Tests in the radiology section of CPT®: ordered and reviewed  Discuss the patient with other providers: yes (Dr Annalisa Ortega)    Patient Progress  Patient progress: stable      Disposition  Final diagnoses:   Vaginal bleeding   Elevated serum hCG     Time reflects when diagnosis was documented in both MDM as applicable and the Disposition within this note     Time User Action Codes Description Comment    2021  5:06 PM Taz German Add [O20 0] Threatened miscarriage in early pregnancy     2021  5:06 PM Taz German Add [N93 9] Vaginal bleeding     2021  5:07 PM Taz German Modify [N93 9] Vaginal bleeding     2021  5:07 PM Josie Livingston Remove [O20 0] Threatened miscarriage in early pregnancy     2021  5:07 PM Taz German Add [E34 9] Elevated serum hCG       ED Disposition     ED Disposition Condition Date/Time Comment    Discharge Stable   5:06 PM [address] discharge to home/self care              Follow-up Information     Follow up With Specialties Details Why Contact Info Additional Information    Ob/Gyn [facility]  Obstetrics and Gynecology Schedule an appointment as soon as possible for a visit   [address] [facility] [phone]  [address] [place], [place], [facility] Emergency Department Emergency Medicine  If symptoms worsen [facility] [address] [phone]  [facility] Emergency Department, [address], [address]          Discharge Medication List as of 6/7/2021  5:35 PM      CONTINUE these medications which have NOT CHANGED    Details   albuterol (Ventolin HFA) 90 mcg/act inhaler Inhale 1-2 puffs every 4 (four) hours as needed for wheezing or shortness of breath, Starting Thu 1/7/2021, Normal           Outpatient Discharge Orders   hCG, quantitative   Standing Status: Future Standing Exp   Date: 06/07/22       PDMP Review     None          ED Provider  Electronically Signed by           Kim Schmidt PA-C  06/09/21 7961

## 2021-06-08 LAB — BACTERIA UR CULT: NORMAL

## 2021-06-09 ENCOUNTER — TELEPHONE (OUTPATIENT)
Dept: OBGYN CLINIC | Facility: MEDICAL CENTER | Age: 26
End: 2021-06-09

## 2021-07-19 ENCOUNTER — TELEPHONE (OUTPATIENT)
Dept: OBGYN CLINIC | Facility: MEDICAL CENTER | Age: 26
End: 2021-07-19

## 2021-07-19 DIAGNOSIS — N91.2 AMENORRHEA: Primary | ICD-10-CM

## 2021-07-19 NOTE — TELEPHONE ENCOUNTER
Pt called in had positive pregnancy test LMP 06/06/2021 she will be using [facility]  Please review and call pt

## 2021-07-19 NOTE — TELEPHONE ENCOUNTER
LMP=6/6/2021  Positive UPT  Known Rh positive  HCG/PRO ordered    Measures to help with nausea and vomiting discussed

## 2021-07-23 ENCOUNTER — LAB (OUTPATIENT)
Dept: LAB | Facility: HOSPITAL | Age: 26
End: 2021-07-23
Attending: OBSTETRICS & GYNECOLOGY
Payer: COMMERCIAL

## 2021-07-23 DIAGNOSIS — N91.2 AMENORRHEA: ICD-10-CM

## 2021-07-23 LAB — B-HCG SERPL-ACNC: ABNORMAL MIU/ML

## 2021-07-23 PROCEDURE — 84144 ASSAY OF PROGESTERONE: CPT

## 2021-07-23 PROCEDURE — 84702 CHORIONIC GONADOTROPIN TEST: CPT

## 2021-07-23 PROCEDURE — 36415 COLL VENOUS BLD VENIPUNCTURE: CPT

## 2021-07-24 ENCOUNTER — NURSE TRIAGE (OUTPATIENT)
Dept: OTHER | Facility: OTHER | Age: 26
End: 2021-07-24

## 2021-07-24 LAB — PROGEST SERPL-MCNC: 15.6 NG/ML

## 2021-07-24 NOTE — TELEPHONE ENCOUNTER
On call reached via TC  Will call Reglan 10mg TID  Notified patient medication will make her drowsy and not to drive while taking  Plan to follow up with OB on Monday  Reason for Disposition   MILD-MODERATE vomiting (e g , 1-5 times / day) or nausea    Answer Assessment - Initial Assessment Questions  1  VOMITING SEVERITY: "How many times have you vomited in the past 24 hours?"      - MILD:  1 - 2 times/day     - MODERATE: 3 - 5 times/day, decreased oral intake without significant weight loss or symptoms of dehydration     - SEVERE: 6 or more times/day, vomits everything or nearly everything, with significant weight loss, symptoms of dehydration       5x     2  ONSET: "When did the vomiting begin?"       Couple days     3  FLUIDS: "What fluids or food have you vomited up today?" "Are you able to keep any liquids down?"      Vomit today 3x  Keeping liquids done  4  TREATMENT: "What have you been doing so far to treat this?"       B6    5  DEHYDRATION: "When was the last time you urinated?" "Are you feeling lightheaded?" "Weight loss?"      Little lightheaded     6  PREGNANCY: "How many weeks pregnant are you?" "How has the pregnancy been going?"      7 weeks     7  MARII: "What date are you expecting to deliver?"      Unsure    8  MEDICATIONS: "What medications are you taking?" (e g , prenatal vitamins, iron)      Prenatal vitamins    9   OTHER SYMPTOMS: "Do you have any other symptoms?"      No    Protocols used: PREGNANCY - MORNING SICKNESS (NAUSEA AND VOMITING OF PREGNANCY)-ADULT-

## 2021-07-25 NOTE — RESULT ENCOUNTER NOTE
Please call patient with results  Labs consistent with early pregnancy  7w0d by LMP  Needs US for pregnancy dating/location within next 3wks  Can complete here or with radiology

## 2021-07-26 ENCOUNTER — TELEPHONE (OUTPATIENT)
Dept: OBGYN CLINIC | Facility: MEDICAL CENTER | Age: 26
End: 2021-07-26

## 2021-07-26 DIAGNOSIS — Z32.01 POSITIVE BLOOD PREGNANCY TEST: Primary | ICD-10-CM

## 2021-07-26 NOTE — TELEPHONE ENCOUNTER
Patient called in, LMP 06/06 stating that she has been having nausea and vomiting in pregnancy and was prescribed a medication from the on call doctor this weekend, however she stated the medication has caused bad side effects and would like to know if an alternative could be given  Please review, thank you

## 2021-07-26 NOTE — TELEPHONE ENCOUNTER
returned call to patient  States she spoke with on-call physician over weekend  Was given reglan for n/v  Is getting some relief but is requesting additional instruction  Dietary measures discussed  Also encouraged small frequent meals  Advised ok to try vitamin B6 and unisom with the reglan  Encouraged to go to ED for fluids if dehydrated    Instructions for scheduling dating US given

## 2021-07-29 ENCOUNTER — HOSPITAL ENCOUNTER (OUTPATIENT)
Dept: ULTRASOUND IMAGING | Facility: HOSPITAL | Age: 26
Discharge: HOME | End: 2021-07-29
Attending: OBSTETRICS & GYNECOLOGY
Payer: COMMERCIAL

## 2021-07-29 DIAGNOSIS — Z32.01 POSITIVE BLOOD PREGNANCY TEST: ICD-10-CM

## 2021-07-29 PROCEDURE — 76801 OB US < 14 WKS SINGLE FETUS: CPT

## 2021-08-02 ENCOUNTER — TELEPHONE (OUTPATIENT)
Dept: OBGYN CLINIC | Facility: MEDICAL CENTER | Age: 26
End: 2021-08-02

## 2021-08-02 NOTE — RESULT ENCOUNTER NOTE
Please call patient with results  US notable for intrauterine pregnancy, 6w6d by this scan  Needs OB Intake and H&P  Also +subchorionic hematoma (1 4cm); review bleeding precautions

## 2021-08-02 NOTE — TELEPHONE ENCOUNTER
Patient returned nurse phone call in regards to ultrasound results  Needs to review bleeding precautions as she has subchronic hematoma  Please review  Thank you

## 2021-08-06 ENCOUNTER — TELEPHONE (OUTPATIENT)
Dept: OBGYN CLINIC | Facility: MEDICAL CENTER | Age: 26
End: 2021-08-06

## 2021-08-07 ENCOUNTER — NURSE TRIAGE (OUTPATIENT)
Dept: OTHER | Facility: OTHER | Age: 26
End: 2021-08-07

## 2021-08-07 DIAGNOSIS — O21.9 NAUSEA/VOMITING IN PREGNANCY: ICD-10-CM

## 2021-08-07 RX ORDER — METOCLOPRAMIDE 10 MG/1
10 TABLET ORAL 3 TIMES DAILY PRN
Qty: 30 TABLET | Refills: 0 | Status: SHIPPED | OUTPATIENT
Start: 2021-08-07 | End: 2021-08-09 | Stop reason: SDUPTHER

## 2021-08-07 NOTE — TELEPHONE ENCOUNTER
Nurse who originally triaged this pt forwarded information to me to relay back to pt based of of providers response  Dr Nelida Murphy sent over Reglan for pt but recommended ER visit if pt continues to feel dehydrated  Stated if pt is able to hold her own with fluids then she should follow up with the office on Monday for further recommendations  Called pt back to relay recommendation but no answer  LM on VM and recommended she call back if she has any further questions

## 2021-08-07 NOTE — TELEPHONE ENCOUNTER
Pt requested med refill on Thurs 8/5/21  Additionally left message on Friday 8/6/21  Requesting r/f of Reglan, as she is still very nauseous and vomiting daily

## 2021-08-07 NOTE — TELEPHONE ENCOUNTER
8 weeks  NKDA  Requesting r/f of Reglan  (Called on Thurs and Fri)  Still nauseous and vomiting daily  Pt wondering if any additional med she can try? Also taking Unisom and vit B6  Pharmacy on record  Thanks    Reason for Disposition   [1] Prescription refill request for ESSENTIAL medicine (i e , likelihood of harm to patient if not taken) AND [2] triager unable to refill per department policy    Answer Assessment - Initial Assessment Questions  1   DRUG NAME: "What medicine do you need to have refilled?"  Reglan    Protocols used: MEDICATION REFILL AND RENEWAL CALL-ADULT-

## 2021-08-09 DIAGNOSIS — O21.9 NAUSEA/VOMITING IN PREGNANCY: ICD-10-CM

## 2021-08-09 RX ORDER — METOCLOPRAMIDE 10 MG/1
10 TABLET ORAL 3 TIMES DAILY PRN
Qty: 90 TABLET | Refills: 1 | Status: SHIPPED | OUTPATIENT
Start: 2021-08-09 | End: 2021-09-08

## 2021-08-16 ENCOUNTER — TELEPHONE (OUTPATIENT)
Dept: OBGYN CLINIC | Facility: MEDICAL CENTER | Age: 26
End: 2021-08-16

## 2021-08-16 NOTE — LETTER
August 16, 2021     Patient: Clinton Oden   YOB: 1995           To Whom it May Concern:    Deniz Dumont is under my professional care  Please excuse from work today, August 16, 2021  If you have any questions or concerns, please don't hesitate to call           Sincerely,          Moises Justin MD      CC: No Recipients

## 2021-08-16 NOTE — TELEPHONE ENCOUNTER
Pt has bben throwing up all weekend would like work note does not feel like she could handle going into work tonight  Please review and contact pt

## 2021-08-18 ENCOUNTER — TELEPHONE (OUTPATIENT)
Dept: OBGYN CLINIC | Facility: MEDICAL CENTER | Age: 26
End: 2021-08-18

## 2021-09-07 NOTE — PATIENT INSTRUCTIONS
Pregnancy at 7 to [address]:   Changes happening to your body:  Pregnancy hormones may cause your body to go through many changes during this stage of your pregnancy  You may feel more tired than usual, and have mood swings, nausea and vomiting, and headaches  Your breasts may feel tender and swollen and you may urinate more frequently  Seek care immediately if:   · You have pain or cramping in your abdomen or low back  · You have heavy vaginal bleeding or clotting  · You pass material that looks like tissue or large clots  Collect the material and bring it with you  Call your doctor or obstetrician if:   · You have light bleeding  · You have chills or a fever  · You have vaginal itching, burning, or pain  · You have yellow, green, white, or foul-smelling vaginal discharge  · You have pain or burning when you urinate, less urine than usual, or pink or bloody urine  · You have questions or concerns about your condition or care  How to care for yourself at this stage of your pregnancy:   · Manage nausea and vomiting  Avoid fatty and spicy foods  Eat small meals throughout the day instead of large meals  Jacqueline may help to decrease nausea  Ask your healthcare provider about other ways of decreasing nausea and vomiting  · Eat a variety of healthy foods  Healthy foods include fruits, vegetables, whole-grain breads, low-fat dairy foods, beans, lean meats, and fish  Drink liquids as directed  Ask how much liquid to drink each day and which liquids are best for you  Limit caffeine to less than 200 milligrams each day  Limit your intake of fish to 2 servings each week  Choose fish low in mercury such as canned light tuna, shrimp, salmon, cod, or tilapia  Do not  eat fish high in mercury such as swordfish, tilefish, nel mackerel, and shark  · Take prenatal vitamins as directed    Your need for certain vitamins and minerals, such as folic acid, increases during pregnancy  Prenatal vitamins provide some of the extra vitamins and minerals you need  Prenatal vitamins may also help to decrease the risk of certain birth defects  · Ask how much weight you should gain each month  Too much or too little weight gain can be unhealthy for you and your baby  · Do not smoke  Smoking increases your risk of a miscarriage and other health problems during your pregnancy  Smoking can cause your baby to be born too early or weigh less at birth  Quit smoking as soon as you think you might be pregnant  Ask your healthcare provider for information if you need help quitting  · Do not drink alcohol  Alcohol passes from your body to your baby through the placenta  It can affect your baby's brain development and cause fetal alcohol syndrome (FAS)  FAS is a group of conditions that causes mental, behavior, and growth problems  · Talk to your healthcare provider before you take any medicines  Many medicines may harm your baby if you take them when you are pregnant  Do not take any medicines, vitamins, herbs, or supplements without first talking to your healthcare provider  Never use illegal or street drugs (such as marijuana or cocaine) while you are pregnant  Safety tips during pregnancy:   · Avoid hot tubs and saunas  Do not use a hot tub or sauna while you are pregnant, especially during your first trimester  Hot tubs and saunas may raise your baby's temperature and increase the risk of birth defects  · Avoid toxoplasmosis  This is an infection caused by eating raw meat or being around infected cat feces  It can cause birth defects, miscarriages, and other problems  Wash your hands after you touch raw meat  Make sure any meat is well-cooked before you eat it  Avoid raw eggs and unpasteurized milk  Use gloves or ask someone else to clean your cat's litter box while you are pregnant      Changes that are happening with your baby:  By 10 weeks, your baby will be about 2½ inches long from the top of the head to the rump (baby's bottom)  Your baby weighs about ½ ounce  Major body organs, such as the brain, heart, and lungs, are forming  Your baby's facial features are also starting to form  Prenatal care:  Prenatal care is a series of visits with your healthcare provider throughout your pregnancy  During the first 28 weeks of your pregnancy, you will see your healthcare provider 1 time each month  Prenatal care can help prevent problems during pregnancy and childbirth  Your healthcare provider will check your blood pressure and weight  Your baby's heart rate will also be checked  You may also need the following at some visits:  · A pelvic exam  allows your healthcare provider to see your cervix (the bottom part of your uterus)  Your healthcare provider will use a speculum to open your vagina  He or she will check the size and shape of your uterus  You may also have a Pap smear at your first prenatal visit  This is a test to check your cervix for abnormal cells  · Blood tests  may be done to check for any of the following:     ? Gestational diabetes or anemia (low iron level)    ? Blood type or Rh factor, or certain birth defects    ? Immunity to certain diseases, such as chickenpox or rubella    ? An infection, such as a sexually transmitted infection, HIV, or hepatitis B    · Hepatitis B  may need to be prevented or treated  Hepatitis B is inflammation of the liver caused by the hepatitis B virus (HBV)  HBV can spread from a mother to her baby during delivery  You will be checked for HBV as early as possible in the first trimester of each pregnancy  You need the test even if you received the hepatitis B vaccine or were tested before  You may need to have an HBV infection treated before you give birth  · Urine tests  may also be done to check for sugar and protein  These can be signs of gestational diabetes or preeclampsia   Urine tests may also be done to check for signs of infection  · A fetal ultrasound  shows pictures of your baby inside your uterus  The pictures are used to check your baby's development, movement, and position  · Genetic disorder screening tests  may be offered to you  These screening tests check your baby's risk for genetic disorders such as Down syndrome  A screening test includes a blood test and ultrasound  Follow up with your doctor or obstetrician as directed:  Go to all prenatal visits  Write down your questions so you remember to ask them during your visits  © Copyright [employer] 2021 Information is for End User's use only and may not be sold, redistributed or otherwise used for commercial purposes  All illustrations and images included in CareNotes® are the copyrighted property of A D A M , Inc  or Carmelo Harman   The above information is an  only  It is not intended as medical advice for individual conditions or treatments  Talk to your doctor, nurse or pharmacist before following any medical regimen to see if it is safe and effective for you  Pregnancy at 11 to 14 Weeks   AMBULATORY CARE:   Changes happening to your body: You are now at the end of your first trimester and entering your second trimester  Morning sickness usually goes away by this time  You may have other symptoms such as fatigue, frequent urination, and headaches  You may have gained 2 to 4 pounds by now  Seek care immediately if:   · You have pain or cramping in your abdomen or low back  · You have heavy vaginal bleeding or clotting  · You pass material that looks like tissue or large clots  Collect the material and bring it with you  Call your doctor or obstetrician if:   · You cannot keep food or drinks down, and you are losing weight  · You have light vaginal bleeding  · You have chills or a fever  · You have vaginal itching, burning, or pain  · You have yellow, green, white, or foul-smelling vaginal discharge      · You have pain or burning when you urinate, less urine than usual, or pink or bloody urine  · You have questions or concerns about your condition or care  How to care for yourself at this stage of your pregnancy:   · Get plenty of rest   You may feel more tired than normal  You may need to take naps or go to bed earlier  · Manage nausea and vomiting  Avoid fatty and spicy foods  Eat small meals throughout the day instead of large meals  Jacqueline may help to decrease nausea  Ask your healthcare provider about other ways of decreasing nausea and vomiting  · Eat a variety of healthy foods  Healthy foods include fruits, vegetables, whole-grain breads, low-fat dairy foods, beans, lean meats, and fish  Drink liquids as directed  Ask how much liquid to drink each day and which liquids are best for you  Limit caffeine to less than 200 milligrams each day  Limit your intake of fish to 2 servings each week  Choose fish low in mercury such as canned light tuna, shrimp, salmon, cod, or tilapia  Do not  eat fish high in mercury such as swordfish, tilefish, nel mackerel, and shark  · Take prenatal vitamins as directed  Your need for certain vitamins and minerals, such as folic acid, increases during pregnancy  Prenatal vitamins provide some of the extra vitamins and minerals you need  Prenatal vitamins may also help to decrease the risk of certain birth defects  · Do not smoke  Smoking increases your risk of a miscarriage and other health problems during your pregnancy  Smoking can cause your baby to be born too early or weigh less at birth  Ask your healthcare provider for information if you need help quitting  · Do not drink alcohol  Alcohol passes from your body to your baby through the placenta  It can affect your baby's brain development and cause fetal alcohol syndrome (FAS)  FAS is a group of conditions that causes mental, behavior, and growth problems       · Talk to your healthcare provider before you take any medicines  Many medicines may harm your baby if you take them when you are pregnant  Do not take any medicines, vitamins, herbs, or supplements without first talking to your healthcare provider  Never use illegal or street drugs (such as marijuana or cocaine) while you are pregnant  Safety tips during pregnancy:   · Avoid hot tubs and saunas  Do not use a hot tub or sauna while you are pregnant, especially during your first trimester  Hot tubs and saunas may raise your baby's temperature and increase the risk of birth defects  · Avoid toxoplasmosis  This is an infection caused by eating raw meat or being around infected cat feces  It can cause birth defects, miscarriages, and other problems  Wash your hands after you touch raw meat  Make sure any meat is well-cooked before you eat it  Avoid raw eggs and unpasteurized milk  Use gloves or ask someone else to clean your cat's litter box while you are pregnant  Changes happening with your baby: Your baby has fully formed fingernails and toenails  Your baby's heartbeat can now be heard  Ask your healthcare provider if you can listen to your baby's heartbeat  By week 14, your baby is over 4 inches long from the top of the head to the rump (baby's bottom)  Your baby weighs over 3 ounces  Prenatal care:  Prenatal care is a series of visits with your healthcare provider throughout your pregnancy  During the first 28 weeks of your pregnancy, you will see your healthcare provider 1 time each month  Prenatal care can help prevent problems during pregnancy and childbirth  Your healthcare provider will check your blood pressure and weight  Your baby's heart rate will also be checked  You may also need the following at some visits:  · A pelvic exam  allows your healthcare provider to see your cervix (the bottom part of your uterus)  Your healthcare provider will use a speculum to open your vagina   He or she will check the size and shape of your uterus  · Blood tests  may be done to check for any of the following:     ? Gestational diabetes or anemia (low iron level)    ? Blood type or Rh factor, or certain birth defects    ? Immunity to certain diseases, such as chickenpox or rubella    ? An infection, such as a sexually transmitted infection, HIV, or hepatitis B    · Hepatitis B  may need to be prevented or treated  Hepatitis B is inflammation of the liver caused by the hepatitis B virus (HBV)  HBV can spread from a mother to her baby during delivery  You will be checked for HBV as early as possible in the first trimester of each pregnancy  You need the test even if you received the hepatitis B vaccine or were tested before  You may need to have an HBV infection treated before you give birth  · Urine tests  may also be done to check for sugar and protein  These can be signs of gestational diabetes or preeclampsia  Urine tests may also be done to check for signs of infection  · A fetal ultrasound  shows pictures of your baby inside your uterus  The pictures are used to check your baby's development, movement, and position  · Genetic disorder screening tests  may be offered to you  These tests check your baby's risk for genetic disorders such as Down syndrome  A screening test includes a blood test and ultrasound  Follow up with your doctor or obstetrician as directed:  Go to all prenatal visits  Write down your questions so you remember to ask them during your visits  © Copyright [employer] 2021 Information is for End User's use only and may not be sold, redistributed or otherwise used for commercial purposes  All illustrations and images included in CareNotes® are the copyrighted property of A D A M , Inc  or [facility] Jamie Harman   The above information is an  only  It is not intended as medical advice for individual conditions or treatments   Talk to your doctor, nurse or pharmacist before following any medical regimen to see if it is safe and effective for you

## 2021-09-09 ENCOUNTER — INITIAL PRENATAL (OUTPATIENT)
Dept: OBGYN CLINIC | Facility: MEDICAL CENTER | Age: 26
End: 2021-09-09
Payer: COMMERCIAL

## 2021-09-09 DIAGNOSIS — E34.9 ELEVATED SERUM HCG: ICD-10-CM

## 2021-09-09 DIAGNOSIS — Z87.09 HISTORY OF ASTHMA: Primary | ICD-10-CM

## 2021-09-09 DIAGNOSIS — Z87.898: ICD-10-CM

## 2021-09-09 DIAGNOSIS — R05.9 COUGH: ICD-10-CM

## 2021-09-09 DIAGNOSIS — F41.9 ANXIETY: ICD-10-CM

## 2021-09-09 DIAGNOSIS — N93.9 VAGINAL BLEEDING: ICD-10-CM

## 2021-09-09 DIAGNOSIS — T75.89XA EXPOSURE TO CAT FECES, INITIAL ENCOUNTER: ICD-10-CM

## 2021-09-09 DIAGNOSIS — Z34.91 ENCOUNTER FOR PREGNANCY RELATED EXAMINATION IN FIRST TRIMESTER: Primary | ICD-10-CM

## 2021-09-09 PROCEDURE — T1001 NURSING ASSESSMENT/EVALUATN: HCPCS | Performed by: STUDENT IN AN ORGANIZED HEALTH CARE EDUCATION/TRAINING PROGRAM

## 2021-09-09 RX ORDER — ALBUTEROL SULFATE 90 UG/1
2 AEROSOL, METERED RESPIRATORY (INHALATION) EVERY 6 HOURS PRN
Qty: 18 G | Refills: 0 | Status: SHIPPED | OUTPATIENT
Start: 2021-09-09

## 2021-09-09 RX ORDER — SERTRALINE HYDROCHLORIDE 25 MG/1
25 TABLET, FILM COATED ORAL DAILY
Qty: 30 TABLET | Refills: 0 | Status: SHIPPED | OUTPATIENT
Start: 2021-09-09 | End: 2021-09-23 | Stop reason: SDUPTHER

## 2021-09-09 NOTE — LETTER
September 9, 2021     Patient: Maximiliano Pals   YOB: 1995   Date of Visit: 9/9/2021       To Whom it May Concern:    Vamsi Whitmorerobert is under my professional care  She was seen in my office on 9/9/2021  Please excuse from work until next ultrasound, scheduled for 9/16/2021  If you have any questions or concerns, please don't hesitate to call           Sincerely,          Celina Hunter MD      CC: No Recipients

## 2021-09-09 NOTE — TELEPHONE ENCOUNTER
H/O asthma  Requesting refill of albuterol  Also with elevated EPDS screen  Long-standing history of anxiety without thoughts of harming self    Requesting treatment

## 2021-09-09 NOTE — PROGRESS NOTES
OB INTAKE INTERVIEW      Pt presents for OB intake  Pre pregnancy weight= 201 lbs      OB History    Para Term  AB Living   4 2 2 0 1 2   SAB TAB Ectopic Multiple Live Births   1 0 0 0 2      # Outcome Date GA Lbr Devyn/2nd Weight Sex Delivery Anes PTL Lv   4 Current            3 SAB 2021 8w0d    SAB      2 Term 09/10/19 41w1d / 01:48 3884 g (8 lb 9 oz) M Vag-Spont EPI N CAROL   1 Term 12/10/16 40w3d / 01:47 3780 g (8 lb 5 3 oz) F Vag-Spont EPI N CAROL         Hx of  delivery prior to 36 weeks 6 days:  NO     Last Menstrual Period:   No LMP recorded (lmp unknown)  Patient is pregnant  Ultrasound date:   2021 6 weeks 6 days  Estimated date of delivery:   Estimated Date of Delivery: 3/18/2022  confirmed by  US  ? History of Diabetes: denies  History of Hypertension: denies      Infection Screening: Does the pt have a hx of MRSA? denies    H&P visit scheduled  ?  Interview education  Handouts given: How to Access Pregnancy Essentials Guide   Warning Signs During Pregnancy   Taking Medications During Pregnancy   Childbirth and Parenting Classes brochure  [facility] support center information sheet  [facility] information sheet  COVID-19: Information on COVID-19 mRNA vaccine given      [facility]  Discussed genetic testing-    - referral to MFM given  Has NT scheduled for 2021  Call initiated to MFM to move appointment ahead d/t bleeding and known subchorionic bleed on dating US  They will contact patient directly  Referral amended to include genetic counselor as FOB has sibling with MR and nephew with gastroschesis       - Information on CF and SMA carrier screening reviewed  Patient will let office know at next appointment if screening is desired    States she has been having intermittent pink-red vaginal bleeding  Occurs at end of day  Denies pain or passing of clots  Strict bleeding precautions reviewed    Work note given until next [place] only    Discussed Tdap and Influenza vaccines           SBIRT Screen:has used medical marijuana  Depression Screening Follow-up Plan: Patient's depression screening was Positive with an [place] score of  14  Denies all thoughts of harming self or others  States she has long-standing history of anxiety  Does not want to see counselor as "it only increases my anxiety"  Is willing to start medication  Zoloft 25 mg ordered  Will evaluate effectiveness at next appointment which is scheduled for 9/20/2021  Patient declined f/u appointment for 9/13/2021     Has exposure to cat feces-toxo IgG and IgM ordered with PN panel    Albuterol inhaler refilled with instructions to contact office if using frequently                The patient was oriented to our practice and all questions were answered    Interviewed by: Breanne Mcdaniels RN 09/09/21

## 2021-09-10 ENCOUNTER — TELEPHONE (OUTPATIENT)
Dept: PERINATAL CARE | Facility: CLINIC | Age: 26
End: 2021-09-10

## 2021-09-13 ENCOUNTER — HOSPITAL ENCOUNTER (EMERGENCY)
Facility: HOSPITAL | Age: 26
End: 2021-09-14
Attending: EMERGENCY MEDICINE | Admitting: EMERGENCY MEDICINE
Payer: COMMERCIAL

## 2021-09-13 ENCOUNTER — APPOINTMENT (EMERGENCY)
Dept: ULTRASOUND IMAGING | Facility: HOSPITAL | Age: 26
End: 2021-09-13
Payer: COMMERCIAL

## 2021-09-13 DIAGNOSIS — O03.9 SPONTANEOUS MISCARRIAGE: Primary | ICD-10-CM

## 2021-09-13 LAB
ABO GROUP BLD: NORMAL
ALBUMIN SERPL BCP-MCNC: 4 G/DL (ref 3.5–5.7)
ALP SERPL-CCNC: 60 U/L (ref 40–150)
ALT SERPL W P-5'-P-CCNC: 11 U/L (ref 7–52)
ANION GAP SERPL CALCULATED.3IONS-SCNC: 10 MMOL/L (ref 4–13)
APTT PPP: 26 SECONDS (ref 23–37)
AST SERPL W P-5'-P-CCNC: 18 U/L (ref 13–39)
B-HCG SERPL-ACNC: >1350 MIU/ML (ref 0–11.6)
BASOPHILS # BLD AUTO: 0 THOUSANDS/ΜL (ref 0–0.1)
BASOPHILS NFR BLD AUTO: 0 % (ref 0–2)
BILIRUB SERPL-MCNC: 0.3 MG/DL (ref 0.2–1)
BLD GP AB SCN SERPL QL: NEGATIVE
BUN SERPL-MCNC: 9 MG/DL (ref 7–25)
CALCIUM SERPL-MCNC: 9.2 MG/DL (ref 8.6–10.5)
CHLORIDE SERPL-SCNC: 106 MMOL/L (ref 98–107)
CO2 SERPL-SCNC: 21 MMOL/L (ref 21–31)
CREAT SERPL-MCNC: 0.63 MG/DL (ref 0.6–1.2)
EOSINOPHIL # BLD AUTO: 0.1 THOUSAND/ΜL (ref 0–0.61)
EOSINOPHIL NFR BLD AUTO: 1 % (ref 0–5)
ERYTHROCYTE [DISTWIDTH] IN BLOOD BY AUTOMATED COUNT: 14.2 % (ref 11.5–14.5)
GFR SERPL CREATININE-BSD FRML MDRD: 127 ML/MIN/1.73SQ M
GLUCOSE SERPL-MCNC: 109 MG/DL (ref 65–99)
HCT VFR BLD AUTO: 36.7 % (ref 42–47)
HGB BLD-MCNC: 11.9 G/DL (ref 12–16)
INR PPP: 0.96 (ref 0.84–1.19)
LYMPHOCYTES # BLD AUTO: 2.2 THOUSANDS/ΜL (ref 0.6–4.47)
LYMPHOCYTES NFR BLD AUTO: 28 % (ref 21–51)
MCH RBC QN AUTO: 27.4 PG (ref 26–34)
MCHC RBC AUTO-ENTMCNC: 32.4 G/DL (ref 31–37)
MCV RBC AUTO: 85 FL (ref 81–99)
MONOCYTES # BLD AUTO: 0.5 THOUSAND/ΜL (ref 0.17–1.22)
MONOCYTES NFR BLD AUTO: 6 % (ref 2–12)
NEUTROPHILS # BLD AUTO: 5.1 THOUSANDS/ΜL (ref 1.4–6.5)
NEUTS SEG NFR BLD AUTO: 65 % (ref 42–75)
PLATELET # BLD AUTO: 275 THOUSANDS/UL (ref 149–390)
PMV BLD AUTO: 8.7 FL (ref 8.6–11.7)
POTASSIUM SERPL-SCNC: 4 MMOL/L (ref 3.5–5.5)
PROT SERPL-MCNC: 6.7 G/DL (ref 6.4–8.9)
PROTHROMBIN TIME: 12.9 SECONDS (ref 11.6–14.5)
RBC # BLD AUTO: 4.33 MILLION/UL (ref 3.9–5.2)
RH BLD: POSITIVE
SODIUM SERPL-SCNC: 137 MMOL/L (ref 134–143)
SPECIMEN EXPIRATION DATE: NORMAL
WBC # BLD AUTO: 7.9 THOUSAND/UL (ref 4.8–10.8)

## 2021-09-13 PROCEDURE — 80053 COMPREHEN METABOLIC PANEL: CPT | Performed by: EMERGENCY MEDICINE

## 2021-09-13 PROCEDURE — 86901 BLOOD TYPING SEROLOGIC RH(D): CPT | Performed by: EMERGENCY MEDICINE

## 2021-09-13 PROCEDURE — 84702 CHORIONIC GONADOTROPIN TEST: CPT | Performed by: EMERGENCY MEDICINE

## 2021-09-13 PROCEDURE — 85730 THROMBOPLASTIN TIME PARTIAL: CPT | Performed by: EMERGENCY MEDICINE

## 2021-09-13 PROCEDURE — 86920 COMPATIBILITY TEST SPIN: CPT

## 2021-09-13 PROCEDURE — 85025 COMPLETE CBC W/AUTO DIFF WBC: CPT | Performed by: EMERGENCY MEDICINE

## 2021-09-13 PROCEDURE — 96361 HYDRATE IV INFUSION ADD-ON: CPT

## 2021-09-13 PROCEDURE — 85610 PROTHROMBIN TIME: CPT | Performed by: EMERGENCY MEDICINE

## 2021-09-13 PROCEDURE — 86900 BLOOD TYPING SEROLOGIC ABO: CPT | Performed by: EMERGENCY MEDICINE

## 2021-09-13 PROCEDURE — 96360 HYDRATION IV INFUSION INIT: CPT

## 2021-09-13 PROCEDURE — 99291 CRITICAL CARE FIRST HOUR: CPT | Performed by: EMERGENCY MEDICINE

## 2021-09-13 PROCEDURE — 36430 TRANSFUSION BLD/BLD COMPNT: CPT

## 2021-09-13 PROCEDURE — 36415 COLL VENOUS BLD VENIPUNCTURE: CPT | Performed by: EMERGENCY MEDICINE

## 2021-09-13 PROCEDURE — 86850 RBC ANTIBODY SCREEN: CPT | Performed by: EMERGENCY MEDICINE

## 2021-09-13 PROCEDURE — 99291 CRITICAL CARE FIRST HOUR: CPT

## 2021-09-13 PROCEDURE — 76815 OB US LIMITED FETUS(S): CPT

## 2021-09-13 RX ADMIN — SODIUM CHLORIDE 1000 ML: 0.9 INJECTION, SOLUTION INTRAVENOUS at 21:56

## 2021-09-13 RX ADMIN — SODIUM CHLORIDE 1000 ML: 0.9 INJECTION, SOLUTION INTRAVENOUS at 19:17

## 2021-09-13 NOTE — ED PROVIDER NOTES
History  Chief Complaint   Patient presents with    Threatened Miscarriage      12 weeks, pain free, heavy bleeding with clots     28-year-old female G2 P  female presents emergency department complaining of vaginal bleeding  Patient notes that she is currently 12 weeks pregnant and started with heavy bleeding today  She states that she has gushed blood multiple times "  Denies any significant pain except for some suprapubic mild discomfort  Patient notes that she has a history of a subchorionic hemorrhage that was diagnosed 4 weeks ago  Denies any fever or trauma          Prior to Admission Medications   Prescriptions Last Dose Informant Patient Reported? Taking? Prenatal MV & Min w/FA-DHA (PRENATAL ADULT GUMMY/DHA/FA PO)  Self Yes No   Sig: Take 2 tablets by mouth daily   albuterol (ProAir HFA) 90 mcg/act inhaler   No No   Sig: Inhale 2 puffs every 6 (six) hours as needed for wheezing   albuterol (Ventolin HFA) 90 mcg/act inhaler   No No   Sig: Inhale 1-2 puffs every 4 (four) hours as needed for wheezing or shortness of breath   sertraline (ZOLOFT) 25 mg tablet   No No   Sig: Take 1 tablet (25 mg total) by mouth daily      Facility-Administered Medications: None       Past Medical History:   Diagnosis Date    Anemia     Anxiety     Asthma     Miscarriage 2021    Mononucleosis     Substance abuse ([facility] [address] )     medical marijuana    Urogenital trichomoniasis     UTI (urinary tract infection)     Varicella     immunized       History reviewed  No pertinent surgical history  Family History   Problem Relation Age of Onset    Heart disease Mother     Hypertension Mother     Skin cancer Mother     Skin cancer Maternal Grandmother     Breast cancer Neg Hx     Colon cancer Neg Hx     Ovarian cancer Neg Hx      I have reviewed and agree with the history as documented      E-Cigarette/Vaping    E-Cigarette Use Current Some Day User     Comments Medical marijuana only E-Cigarette/Vaping Substances    THC Yes      Social History     Tobacco Use    Smoking status: Never Smoker    Smokeless tobacco: Never Used   Vaping Use    Vaping Use: Some days    Substances: THC   Substance Use Topics    Alcohol use: Not Currently     Comment: rarely/prior to pregnancy only    Drug use: Not Currently     Types: Marijuana     Comment: medical marijuana only/very occasional       Review of Systems   Constitutional: Negative for chills and fever  HENT: Negative for ear pain and sore throat  Eyes: Negative for pain and visual disturbance  Respiratory: Negative for cough and shortness of breath  Cardiovascular: Negative for chest pain and palpitations  Gastrointestinal: Negative for abdominal pain and vomiting  Genitourinary: Positive for pelvic pain and vaginal bleeding  Negative for dysuria and hematuria  Musculoskeletal: Negative for arthralgias and back pain  Skin: Negative for color change and rash  Neurological: Negative for seizures and syncope  All other systems reviewed and are negative  Physical Exam  Physical Exam  Vitals and nursing note reviewed  Constitutional:       General: She is not in acute distress  Appearance: Normal appearance  She is well-developed  HENT:      Head: Normocephalic and atraumatic  Right Ear: External ear normal       Left Ear: External ear normal       Nose: Nose normal       Mouth/Throat:      Mouth: Mucous membranes are moist    Eyes:      Conjunctiva/sclera: Conjunctivae normal    Cardiovascular:      Rate and Rhythm: Normal rate and regular rhythm  Pulses: Normal pulses  Heart sounds: Normal heart sounds  No murmur heard  Pulmonary:      Effort: Pulmonary effort is normal  No respiratory distress  Breath sounds: Normal breath sounds  Abdominal:      General: Bowel sounds are normal       Palpations: Abdomen is soft  Tenderness: There is abdominal tenderness        Comments: Mild suprapubic tenderness   Genitourinary:     Comments: Patient has moderate vaginal bleeding noted with clot formation  Patient notes she has had multiple clots and changed pads multiple times  Musculoskeletal:         General: No swelling or deformity  Cervical back: Neck supple  Skin:     General: Skin is warm and dry  Capillary Refill: Capillary refill takes less than 2 seconds  Neurological:      General: No focal deficit present  Mental Status: She is alert and oriented to person, place, and time  Mental status is at baseline     Psychiatric:         Mood and Affect: Mood normal          Vital Signs  ED Triage Vitals   Temperature Pulse Respirations Blood Pressure SpO2   09/14/21 0122 09/13/21 1945 09/13/21 1945 09/13/21 1945 09/13/21 1945   (!) 97 2 °F (36 2 °C) 87 (!) 23 123/64 97 %      Temp src Heart Rate Source Patient Position - Orthostatic VS BP Location FiO2 (%)   -- -- -- -- --             Pain Score       09/14/21 0025       8           Vitals:    09/14/21 0115 09/14/21 0130 09/14/21 0145 09/14/21 0200   BP: 95/52 (!) 88/52 91/58 98/57   Pulse: 87 100 75 86         Visual Acuity      ED Medications  Medications   sodium chloride 0 9 % bolus 1,000 mL (0 mL Intravenous Stopped 9/13/21 2150)   sodium chloride 0 9 % bolus 1,000 mL (0 mL Intravenous Stopped 9/13/21 2256)   sodium chloride 0 9 % bolus 1,000 mL (0 mL Intravenous Stopped 9/14/21 0129)   fentanyl citrate (PF) 100 MCG/2ML 25 mcg (25 mcg Intravenous Given 9/14/21 0025)       Diagnostic Studies  Results Reviewed     Procedure Component Value Units Date/Time    Hemoglobin and hematocrit, blood [[id number]]  (Abnormal) Collected: 09/14/21 0023    Lab Status: Final result Specimen: Blood from Hand, Left Updated: 09/14/21 0032     Hemoglobin 7 5 g/dL      Hematocrit 22 3 %     hCG, quantitative [[id number]]  (Abnormal) Collected: 09/13/21 1916    Lab Status: Final result Specimen: Blood from Arm, Right Updated: 09/13/21 1953     HCG, Quant >1,350 00 mIU/mL     Narrative:       Expected Ranges:     Approximate               Approximate HCG  Gestation age          Concentration ( mIU/mL)  _____________          ______________________   Raeann Ramirez                      HCG values  0 2-1                       5-50  1-2                           2-3                         100-5000  3-4                         500-80593  4-5                         1000-96239  5-6                         [id number]  6-8                         67594-535237  8-12                        [id number]      Comprehensive metabolic panel [[id number]]  (Abnormal) Collected: 09/13/21 1916    Lab Status: Final result Specimen: Blood from Arm, Right Updated: 09/13/21 1942     Sodium 137 mmol/L      Potassium 4 0 mmol/L      Chloride 106 mmol/L      CO2 21 mmol/L      ANION GAP 10 mmol/L      BUN 9 mg/dL      Creatinine 0 63 mg/dL      Glucose 109 mg/dL      Calcium 9 2 mg/dL      AST 18 U/L      ALT 11 U/L      Alkaline Phosphatase 60 U/L      Total Protein 6 7 g/dL      Albumin 4 0 g/dL      Total Bilirubin 0 30 mg/dL      eGFR 127 ml/min/1 73sq m     Narrative:      [place] guidelines for Chronic Kidney Disease (CKD):     Stage 1 with normal or high GFR (GFR > 90 mL/min/1 73 square meters)    Stage 2 Mild CKD (GFR = 60-89 mL/min/1 73 square meters)    Stage 3A Moderate CKD (GFR = 45-59 mL/min/1 73 square meters)    Stage 3B Moderate CKD (GFR = 30-44 mL/min/1 73 square meters)    Stage 4 Severe CKD (GFR = 15-29 mL/min/1 73 square meters)    Stage 5 End Stage CKD (GFR <15 mL/min/1 73 square meters)  Note: GFR calculation is accurate only with a steady state creatinine    Protime-INR [[id number]]  (Normal) Collected: 09/13/21 1916    Lab Status: Final result Specimen: Blood from Arm, Right Updated: 09/13/21 1939     Protime 12 9 seconds      INR 0 96    APTT [[id number]]  (Normal) Collected: 09/13/21 1916    Lab Status: Final result Specimen: Blood from Arm, Right Updated: 09/13/21 1939     PTT 26 seconds     CBC and differential [[id number]]  (Abnormal) Collected: 09/13/21 1916    Lab Status: Final result Specimen: Blood from Arm, Right Updated: 09/13/21 1925     WBC 7 90 Thousand/uL      RBC 4 33 Million/uL      Hemoglobin 11 9 g/dL      Hematocrit 36 7 %      MCV 85 fL      MCH 27 4 pg      MCHC 32 4 g/dL      RDW 14 2 %      MPV 8 7 fL      Platelets 663 Thousands/uL      Neutrophils Relative 65 %      Lymphocytes Relative 28 %      Monocytes Relative 6 %      Eosinophils Relative 1 %      Basophils Relative 0 %      Neutrophils Absolute 5 10 Thousands/µL      Lymphocytes Absolute 2 20 Thousands/µL      Monocytes Absolute 0 50 Thousand/µL      Eosinophils Absolute 0 10 Thousand/µL      Basophils Absolute 0 00 Thousands/µL                  US OB limited Dating Study   Final Result by Matty Lin MD (09/13 2032)       Limited transabdominal examination  No evidence of intrauterine pregnancy  Small amount of fluid in the lower uterine segment could represent blood products or products of conception  Workstation performed: [id number]                    Procedures  CriticalCare Time  Performed by: Ryley Santana DO  Authorized by:  Ryley Santana DO     Critical care provider statement:     Critical care time (minutes):  55    Critical care was necessary to treat or prevent imminent or life-threatening deterioration of the following conditions:  Cardiac failure and shock    Critical care was time spent personally by me on the following activities:  Ordering and performing treatments and interventions, ordering and review of laboratory studies, re-evaluation of patient's condition, review of old charts, examination of patient, evaluation of patient's response to treatment and interpretation of cardiac output measurements             ED Course  ED Course as of Sep 14 2159   Mon Sep 13, 2021   2151 Discussed case with Dr Kayleigh Jett, and told him that it seems that the patient just had a syncopal episode in the room  She also noted that in the ultrasound she passed 2 large clots the size of oranges  Due to this continuing bleeding and potential for symptomatic blood loss, it was decided that we should do an ED to ED transfer to [facility] for OB evaluation  2210 Case signed out to Dr Cuate Vickers pending re-evaluation and transport for vaginal bleeding  There is a potential for this patient to become unstable  MDM    Disposition  Final diagnoses:   Spontaneous miscarriage     Time reflects when diagnosis was documented in both MDM as applicable and the Disposition within this note     Time User Action Codes Description Comment    9/13/2021  9:50 PM Nemesio Ramesh Add [O03 9] Spontaneous miscarriage       ED Disposition     ED Disposition Condition Date/Time Comment    Transfer to Another [facility] Sep 13, 2021 10:01 PM Edda Anthony should be transferred out to [facility]          MD Documentation      Most Recent Value   Patient Condition  The patient has been stabilized such that within reasonable medical probability, no material deterioration of the patient condition or the condition of the unborn child(mt) is likely to result from the transfer, An emergency transfer is being made prior to stabilization due to the need for definitive care and the benefit of transfer outweighs the risk   Reason for Transfer  Level of Care needed not available at this facility, No bed available at level of patient's needs   Benefits of Transfer  Specialized equipment and/or services available at the receiving facility (Include comment)________________________, Continuity of care   Risks of Transfer  Potential for delay in receiving treatment, Potential deterioration of medical condition, Loss of IV, Increased discomfort during transfer   Accepting Physician  Kayleigh Jett at [facility]   [facility]   [facility]    (Name & Tel number)  PAC   Transported by (Company and Unit #)  [employer]   Sending MD Margie Olmedo   Provider Certification  General risk, such as traffic hazards, adverse weather conditions, rough terrain or turbulence, possible failure of equipment (including vehicle or aircraft), or consequences of actions of persons outside the control of the transport personnel, Unanticipated needs of medical equipment and personnel during transport, The possibility of a transport vehicle being unavailable      RN Documentation      [facility] [address]   [facility]    (Name & Tel number)  PAC   Transported by [employer] and Unit #)  [facility]      Follow-up Information    None         Discharge Medication List as of 9/14/2021  2:14 AM      CONTINUE these medications which have NOT CHANGED    Details   !! albuterol (ProAir HFA) 90 mcg/act inhaler Inhale 2 puffs every 6 (six) hours as needed for wheezing, Starting Thu 9/9/2021, Normal      !! albuterol (Ventolin HFA) 90 mcg/act inhaler Inhale 1-2 puffs every 4 (four) hours as needed for wheezing or shortness of breath, Starting Thu 1/7/2021, Normal      Prenatal MV & Min w/FA-DHA (PRENATAL ADULT GUMMY/DHA/FA PO) Take 2 tablets by mouth daily, Historical Med      sertraline (ZOLOFT) 25 mg tablet Take 1 tablet (25 mg total) by mouth daily, Starting Thu 9/9/2021, Normal       !! - Potential duplicate medications found  Please discuss with provider  No discharge procedures on file      PDMP Review     None          ED Provider  Electronically Signed by           Mo Eller DO  09/14/21 7661

## 2021-09-14 ENCOUNTER — HOSPITAL ENCOUNTER (OUTPATIENT)
Facility: HOSPITAL | Age: 26
Setting detail: OBSERVATION
Discharge: HOME | End: 2021-09-14
Attending: OBSTETRICS & GYNECOLOGY | Admitting: OBSTETRICS & GYNECOLOGY
Payer: COMMERCIAL

## 2021-09-14 ENCOUNTER — TELEPHONE (OUTPATIENT)
Dept: OBGYN CLINIC | Facility: MEDICAL CENTER | Age: 26
End: 2021-09-14

## 2021-09-14 VITALS
OXYGEN SATURATION: 94 % | TEMPERATURE: 97.3 F | RESPIRATION RATE: 21 BRPM | SYSTOLIC BLOOD PRESSURE: 98 MMHG | DIASTOLIC BLOOD PRESSURE: 57 MMHG | HEART RATE: 86 BPM

## 2021-09-14 VITALS
SYSTOLIC BLOOD PRESSURE: 121 MMHG | OXYGEN SATURATION: 100 % | HEART RATE: 89 BPM | DIASTOLIC BLOOD PRESSURE: 58 MMHG | WEIGHT: 200 LBS | TEMPERATURE: 98 F | HEIGHT: 63 IN | RESPIRATION RATE: 17 BRPM | BODY MASS INDEX: 35.44 KG/M2

## 2021-09-14 DIAGNOSIS — O03.4 INCOMPLETE ABORTION: Primary | ICD-10-CM

## 2021-09-14 PROBLEM — O03.9 MISCARRIAGE: Status: ACTIVE | Noted: 2021-09-14

## 2021-09-14 LAB
ABO GROUP BLD BPU: NORMAL
ABO GROUP BLD: NORMAL
ANION GAP SERPL CALCULATED.3IONS-SCNC: 11 MMOL/L (ref 4–13)
APTT PPP: 26 SECONDS (ref 23–37)
APTT PPP: 28 SECONDS (ref 23–37)
BASOPHILS # BLD AUTO: 0.01 THOUSANDS/ΜL (ref 0–0.1)
BASOPHILS # BLD AUTO: 0.01 THOUSANDS/ΜL (ref 0–0.1)
BASOPHILS NFR BLD AUTO: 0 % (ref 0–1)
BASOPHILS NFR BLD AUTO: 0 % (ref 0–1)
BLD GP AB SCN SERPL QL: NEGATIVE
BPU ID: NORMAL
BUN SERPL-MCNC: 7 MG/DL (ref 5–25)
CALCIUM SERPL-MCNC: 7.1 MG/DL (ref 8.3–10.1)
CHLORIDE SERPL-SCNC: 111 MMOL/L (ref 100–108)
CO2 SERPL-SCNC: 19 MMOL/L (ref 21–32)
CREAT SERPL-MCNC: 0.6 MG/DL (ref 0.6–1.3)
CROSSMATCH: NORMAL
EOSINOPHIL # BLD AUTO: 0 THOUSAND/ΜL (ref 0–0.61)
EOSINOPHIL # BLD AUTO: 0 THOUSAND/ΜL (ref 0–0.61)
EOSINOPHIL NFR BLD AUTO: 0 % (ref 0–6)
EOSINOPHIL NFR BLD AUTO: 0 % (ref 0–6)
ERYTHROCYTE [DISTWIDTH] IN BLOOD BY AUTOMATED COUNT: 14.9 % (ref 11.6–15.1)
ERYTHROCYTE [DISTWIDTH] IN BLOOD BY AUTOMATED COUNT: 15 % (ref 11.6–15.1)
FIBRINOGEN PPP-MCNC: 221 MG/DL (ref 227–495)
FIBRINOGEN PPP-MCNC: 231 MG/DL (ref 227–495)
GFR SERPL CREATININE-BSD FRML MDRD: 129 ML/MIN/1.73SQ M
GLUCOSE SERPL-MCNC: 172 MG/DL (ref 65–140)
HCT VFR BLD AUTO: 22.3 % (ref 42–47)
HCT VFR BLD AUTO: 25.6 % (ref 34.8–46.1)
HCT VFR BLD AUTO: 29.6 % (ref 34.8–46.1)
HGB BLD-MCNC: 7.5 G/DL (ref 12–16)
HGB BLD-MCNC: 8.4 G/DL (ref 11.5–15.4)
HGB BLD-MCNC: 9.7 G/DL (ref 11.5–15.4)
IMM GRANULOCYTES # BLD AUTO: 0.03 THOUSAND/UL (ref 0–0.2)
IMM GRANULOCYTES # BLD AUTO: 0.03 THOUSAND/UL (ref 0–0.2)
IMM GRANULOCYTES NFR BLD AUTO: 0 % (ref 0–2)
IMM GRANULOCYTES NFR BLD AUTO: 0 % (ref 0–2)
INR PPP: 1.23 (ref 0.84–1.19)
INR PPP: 1.3 (ref 0.84–1.19)
LYMPHOCYTES # BLD AUTO: 0.93 THOUSANDS/ΜL (ref 0.6–4.47)
LYMPHOCYTES # BLD AUTO: 1.75 THOUSANDS/ΜL (ref 0.6–4.47)
LYMPHOCYTES NFR BLD AUTO: 19 % (ref 14–44)
LYMPHOCYTES NFR BLD AUTO: 9 % (ref 14–44)
MCH RBC QN AUTO: 29 PG (ref 26.8–34.3)
MCH RBC QN AUTO: 29.2 PG (ref 26.8–34.3)
MCHC RBC AUTO-ENTMCNC: 32.8 G/DL (ref 31.4–37.4)
MCHC RBC AUTO-ENTMCNC: 32.8 G/DL (ref 31.4–37.4)
MCV RBC AUTO: 88 FL (ref 82–98)
MCV RBC AUTO: 89 FL (ref 82–98)
MONOCYTES # BLD AUTO: 0.14 THOUSAND/ΜL (ref 0.17–1.22)
MONOCYTES # BLD AUTO: 0.39 THOUSAND/ΜL (ref 0.17–1.22)
MONOCYTES NFR BLD AUTO: 1 % (ref 4–12)
MONOCYTES NFR BLD AUTO: 4 % (ref 4–12)
NEUTROPHILS # BLD AUTO: 6.91 THOUSANDS/ΜL (ref 1.85–7.62)
NEUTROPHILS # BLD AUTO: 9.71 THOUSANDS/ΜL (ref 1.85–7.62)
NEUTS SEG NFR BLD AUTO: 77 % (ref 43–75)
NEUTS SEG NFR BLD AUTO: 90 % (ref 43–75)
NRBC BLD AUTO-RTO: 0 /100 WBCS
NRBC BLD AUTO-RTO: 0 /100 WBCS
PLATELET # BLD AUTO: 229 THOUSANDS/UL (ref 149–390)
PLATELET # BLD AUTO: 249 THOUSANDS/UL (ref 149–390)
PMV BLD AUTO: 10.5 FL (ref 8.9–12.7)
PMV BLD AUTO: 10.5 FL (ref 8.9–12.7)
POTASSIUM SERPL-SCNC: 4 MMOL/L (ref 3.5–5.3)
PROTHROMBIN TIME: 15.1 SECONDS (ref 11.6–14.5)
PROTHROMBIN TIME: 15.7 SECONDS (ref 11.6–14.5)
RBC # BLD AUTO: 2.9 MILLION/UL (ref 3.81–5.12)
RBC # BLD AUTO: 3.32 MILLION/UL (ref 3.81–5.12)
RH BLD: POSITIVE
SODIUM SERPL-SCNC: 141 MMOL/L (ref 136–145)
SPECIMEN EXPIRATION DATE: NORMAL
UNIT DISPENSE STATUS: NORMAL
UNIT PRODUCT CODE: NORMAL
UNIT PRODUCT VOLUME: 350 ML
UNIT RH: NORMAL
WBC # BLD AUTO: 10.82 THOUSAND/UL (ref 4.31–10.16)
WBC # BLD AUTO: 9.09 THOUSAND/UL (ref 4.31–10.16)

## 2021-09-14 PROCEDURE — 96361 HYDRATE IV INFUSION ADD-ON: CPT

## 2021-09-14 PROCEDURE — 88305 TISSUE EXAM BY PATHOLOGIST: CPT | Performed by: PATHOLOGY

## 2021-09-14 PROCEDURE — 85730 THROMBOPLASTIN TIME PARTIAL: CPT | Performed by: OBSTETRICS & GYNECOLOGY

## 2021-09-14 PROCEDURE — 99235 HOSP IP/OBS SAME DATE MOD 70: CPT | Performed by: OBSTETRICS & GYNECOLOGY

## 2021-09-14 PROCEDURE — 85025 COMPLETE CBC W/AUTO DIFF WBC: CPT | Performed by: OBSTETRICS & GYNECOLOGY

## 2021-09-14 PROCEDURE — 85014 HEMATOCRIT: CPT | Performed by: EMERGENCY MEDICINE

## 2021-09-14 PROCEDURE — 36415 COLL VENOUS BLD VENIPUNCTURE: CPT | Performed by: EMERGENCY MEDICINE

## 2021-09-14 PROCEDURE — NC001 PR NO CHARGE: Performed by: STUDENT IN AN ORGANIZED HEALTH CARE EDUCATION/TRAINING PROGRAM

## 2021-09-14 PROCEDURE — 76856 US EXAM PELVIC COMPLETE: CPT | Performed by: OBSTETRICS & GYNECOLOGY

## 2021-09-14 PROCEDURE — 85384 FIBRINOGEN ACTIVITY: CPT | Performed by: OBSTETRICS & GYNECOLOGY

## 2021-09-14 PROCEDURE — 85018 HEMOGLOBIN: CPT | Performed by: EMERGENCY MEDICINE

## 2021-09-14 PROCEDURE — 99284 EMERGENCY DEPT VISIT MOD MDM: CPT

## 2021-09-14 PROCEDURE — 86901 BLOOD TYPING SEROLOGIC RH(D): CPT | Performed by: OBSTETRICS & GYNECOLOGY

## 2021-09-14 PROCEDURE — 96374 THER/PROPH/DIAG INJ IV PUSH: CPT

## 2021-09-14 PROCEDURE — 86900 BLOOD TYPING SEROLOGIC ABO: CPT | Performed by: OBSTETRICS & GYNECOLOGY

## 2021-09-14 PROCEDURE — 85610 PROTHROMBIN TIME: CPT | Performed by: OBSTETRICS & GYNECOLOGY

## 2021-09-14 PROCEDURE — 86850 RBC ANTIBODY SCREEN: CPT | Performed by: OBSTETRICS & GYNECOLOGY

## 2021-09-14 PROCEDURE — 80048 BASIC METABOLIC PNL TOTAL CA: CPT | Performed by: OBSTETRICS & GYNECOLOGY

## 2021-09-14 PROCEDURE — P9016 RBC LEUKOCYTES REDUCED: HCPCS

## 2021-09-14 RX ORDER — SODIUM CHLORIDE, SODIUM LACTATE, POTASSIUM CHLORIDE, CALCIUM CHLORIDE 600; 310; 30; 20 MG/100ML; MG/100ML; MG/100ML; MG/100ML
125 INJECTION, SOLUTION INTRAVENOUS CONTINUOUS
Status: DISCONTINUED | OUTPATIENT
Start: 2021-09-14 | End: 2021-09-14 | Stop reason: HOSPADM

## 2021-09-14 RX ORDER — FENTANYL CITRATE 50 UG/ML
25 INJECTION, SOLUTION INTRAMUSCULAR; INTRAVENOUS ONCE
Status: COMPLETED | OUTPATIENT
Start: 2021-09-14 | End: 2021-09-14

## 2021-09-14 RX ORDER — ONDANSETRON 2 MG/ML
4 INJECTION INTRAMUSCULAR; INTRAVENOUS EVERY 6 HOURS PRN
Status: DISCONTINUED | OUTPATIENT
Start: 2021-09-14 | End: 2021-09-14 | Stop reason: HOSPADM

## 2021-09-14 RX ADMIN — SODIUM CHLORIDE, SODIUM LACTATE, POTASSIUM CHLORIDE, AND CALCIUM CHLORIDE 125 ML/HR: .6; .31; .03; .02 INJECTION, SOLUTION INTRAVENOUS at 04:55

## 2021-09-14 RX ADMIN — ONDANSETRON 4 MG: 2 INJECTION INTRAMUSCULAR; INTRAVENOUS at 04:56

## 2021-09-14 RX ADMIN — DOXYCYCLINE 200 MG: 100 INJECTION, POWDER, LYOPHILIZED, FOR SOLUTION INTRAVENOUS at 04:55

## 2021-09-14 RX ADMIN — SODIUM CHLORIDE 1000 ML: 0.9 INJECTION, SOLUTION INTRAVENOUS at 00:18

## 2021-09-14 RX ADMIN — FENTANYL CITRATE 25 MCG: 50 INJECTION, SOLUTION INTRAMUSCULAR; INTRAVENOUS at 00:25

## 2021-09-14 NOTE — PROGRESS NOTES
Obstetrics and Gynecology   Attending Progress Note    Assessment/Plan:  Liss Gordillo is a 23 y o  [id number] who presents with acute blood loss anemia in the setting of late second trimester spontaneous  at 12 weeks gestation  Now clinically stable after passing POCs and receiving 2 units PRBC  - Awaiting repeat CBC and coagulation studies  - Clinically she is without ongoing bleeding, dizziness, or tachycardia  - Plan for discharge home this morning with outpatient follow up in our office      Subjective: Kasia Mcghee feels significantly better  Denies ongoing bleeding or cramping  She denies dizziness or lightheadedness      Objective:   /57   Pulse 90   Temp 98 °F (36 7 °C)   Resp 16   Ht 5' 3" (1 6 m)   Wt 90 7 kg (200 lb)   LMP  (LMP Unknown)   SpO2 100%   BMI 35 43 kg/m²     General: Well appearing no distress  Mental status: Alert and oriented x 3  CV: Regular rate and rhythm  Respiratory: Unlabored breathing  Abdomen: Nondistended, notender  : Minimal old spotting on chucks pad, no active vaginal bleeding          Wilder Vargas MD  Obstetrics and Gynecology  [address]

## 2021-09-14 NOTE — ED NOTES
Patient saturated two jeanne pads in blood from time of arrival to completion of pelvic exam by [facility] staff with a total weight of 0 241kg  Significant blood/tissue also present in bedside basin weighing 0 396kg       Mariposa Rojo RN  09/14/21 7899

## 2021-09-14 NOTE — ED NOTES
Patient saturated another jeanne, weighing approximately 0 12kg  Denies dizziness/lightheadedness  Blood pressure stable, however patient more tachycardic  OB notified via [employer]       Twin Thompson, BULMARO  09/14/21 7392

## 2021-09-14 NOTE — ED NOTES
Patient arrives from [facility]-received additional unit of blood in route (total of 2 units PRBC), as well as 2 mcg of Fentanyl  Patient is still bleeding, but not as heavy and not as much clot presence   OB called and immediately to the bedside for pelvic exam      Jp Shukla RN  09/14/21 7015

## 2021-09-14 NOTE — Clinical Note
Chichi Shi was seen and treated in our emergency department on 9/13/2021  Diagnosis:     Laly Ortega    She may return on this date: If you have any questions or concerns, please don't hesitate to call        Salvador Bennett RN    ______________________________           _______________          _______________  Hospital Representative                              Date                                Time

## 2021-09-14 NOTE — ASSESSMENT & PLAN NOTE
Patient with spontaneous, incomplete  for approximately 12 weeks gestation  No intrauterine gestation or fetal heart tones identified on ultrasound with heterogeneous endometrium and likely products in the lower uterine segment/vagina  On sterile speculum exam, clot and products of conception present within the vaginal vault which were gently removed with ring forceps at bedside  Patient tolerated the procedure well  Total QBL 444cc  Minimal instrumentation of the cervix was noted as patient has spontaneously passed majority of products  Follow-up bedside ultrasound revealed a thin endometrial stripe (see images below)  Bleeding was notably improved as was the patient's pain  As the patient is now hemodynamically stable and bleeding is minimum, current risk of surgical intervention and uterine perforation do not outweigh benefits as there is likely minimal tissue remaining within the uterus  Plan to observe patient at this time and trend hemoglobin/coagulation studies following transfusion    However, if bleeding were to increase, would consider surgical intervention with dilation and evacuation   - Doxycyline 200 mg IV once per ACOG guidelines for extraction of products of conception and possible instrumentation within the cervix  - F/u repeat CBC/coags  - Continue Pad counts  - LR @125cc/hr  - NPO pending bleeding/follow-up labs

## 2021-09-14 NOTE — H&P
[facility]  H&P Obstetrics & Gynecology   Clinton Oden 1995, 23 y o  female MRN: [id number]  Unit/Bed#: ED 20 Encounter: [id number]  Primary Care Provider: No primary care provider on file  Date and time admitted to hospital: 2021  2:57 AM    Assessment/Plan:    * Incomplete   Assessment & Plan  Patient with spontaneous, incomplete  for approximately 12 weeks gestation  No intrauterine gestation or fetal heart tones identified on ultrasound with heterogeneous endometrium and likely products in the lower uterine segment/vagina  On sterile speculum exam, clot and products of conception present within the vaginal vault which were gently removed with ring forceps at bedside  Patient tolerated the procedure well  Total QBL 444cc  Minimal instrumentation of the cervix was noted as patient has spontaneously passed majority of products  Follow-up bedside ultrasound revealed a thin endometrial stripe (see images below)  Bleeding was notably improved as was the patient's pain  As the patient is now hemodynamically stable and bleeding is minimum, current risk of surgical intervention and uterine perforation do not outweigh benefits as there is likely minimal tissue remaining within the uterus  Plan to observe patient at this time and trend hemoglobin/coagulation studies following transfusion    However, if bleeding were to increase, would consider surgical intervention with dilation and evacuation   - Doxycyline 200 mg IV once per ACOG guidelines for extraction of products of conception and possible instrumentation within the cervix  - F/u repeat CBC/coags  - Continue Pad counts  - LR @125cc/hr  - NPO pending bleeding/follow-up labs      Patient seen and examined with Dr Anita Rahman    History of Present Illness     HPI:  Clinton Oden is a 23 y o  [id number] female at 13 weeks 0 days who presents as transfer from [facility] ED for vaginal bleeding secondary to incomplete   Patient presented to [facility] ED yesterday evening with worsening vaginal bleeding and cramping  Patient had a known ultrasound confirmed IUP on 2021 dated 6 weeks 6 days at that time with notable subchorionic hematoma measuring 1 4 cm  Patient reports yesterday she was walking around [place] when it felt like she was urinating, but was actually having heavy vaginal bleeding and large gushes  She then started having subsequent cramping and presented to the ED for evaluation for concern of miscarriage  On evaluation in the ED, bedside ultrasound did not show evidence of an intra uterine gestation and no fetal heart tones were identified  Her bleeding was noted to be persistent in the ED and she began having hot flashes and felt like she was going to faint  Hemoglobin was stable at that time 11 7  [facility] ED contacted [facility] on-call OB/GYN which accepted transfer around , however, due to limited transportation availability and poor weather, patient did not arrive to [facility] until 0300  While awaiting transfer into the [facility] ED, patient had a syncopal episode  She was noted to be tachycardic and hypotensive  Stat repeat hemoglobin was 7 5  She was typed and screen and transfuse 2 units packed red blood cells prior to transfer  Her symptoms improved following transfusion  On examination in [facility] ED, patient reports she has been feeling better since the blood transfusion  She reports the hot flashes are less frequent and she does not feel syncopal   She is still having heavy vaginal bleeding and cramping/suprapubic pain  She reports some chills, but denies fevers  She reports she is very thirsty and hungry  Patient reports that she had her initial nurse intake prenatal visit for this pregnancy, but had not had her prenatal intake with a physician yet  She has history of 2 prior term SVDs of which were uncomplicated  She is Rh positive    Patient believes she miscarried a week or 2 ago, but was unable to be seen in the office due to limited office appointments  Review of Systems   Constitutional: Positive for chills  Negative for fever  HENT: Negative  Eyes: Negative for visual disturbance  Respiratory: Negative for shortness of breath  Cardiovascular: Negative for chest pain  Gastrointestinal: Negative for abdominal pain, constipation, diarrhea, nausea and vomiting  Genitourinary: Positive for pelvic pain and vaginal discharge  Negative for dysuria and hematuria  Musculoskeletal: Positive for back pain  Skin: Positive for pallor  Neurological: Positive for syncope and light-headedness  Negative for headaches  Historical Information   Past Medical History:   Diagnosis Date    Anemia     Anxiety     Asthma     Mononucleosis     Substance abuse ([address] )     medical marijuana    Urogenital trichomoniasis     UTI (urinary tract infection)     Varicella     immunized     History reviewed  No pertinent surgical history      OB/GYN History: [id number]  - Two prior term SVDs    Family History   Problem Relation Age of Onset    Heart disease Mother     Hypertension Mother     Skin cancer Mother     Skin cancer Maternal Grandmother     Breast cancer Neg Hx     Colon cancer Neg Hx     Ovarian cancer Neg Hx      Social History   Social History     Substance and Sexual Activity   Alcohol Use Not Currently    Comment: rarely/prior to pregnancy only     Social History     Substance and Sexual Activity   Drug Use Not Currently    Types: Marijuana    Comment: medical marijuana only/very occasional     Social History     Tobacco Use   Smoking Status Never Smoker   Smokeless Tobacco Never Used       Meds/Allergies   (Not in a hospital admission)    Allergies   Allergen Reactions    Pollen Extract Sneezing       Objective:    Vitals:    09/14/21 0302 09/14/21 0324 09/14/21 0405   BP: 108/65 99/58 100/68   BP Location: Left arm Left arm Left arm   Pulse: 89 93 87   Resp: 18 18   Temp: 97 6 °F (36 4 °C)     TempSrc: Oral     SpO2: 97%  98%        Physical Exam  Vitals and nursing note reviewed  Exam conducted with a chaperone present  Constitutional:       General: She is not in acute distress  Appearance: She is well-developed  She is ill-appearing  HENT:      Head: Normocephalic and atraumatic  Eyes:      General: No scleral icterus  Conjunctiva/sclera: Conjunctivae normal    Cardiovascular:      Rate and Rhythm: Normal rate and regular rhythm  Heart sounds: No murmur heard  Pulmonary:      Effort: Pulmonary effort is normal  No respiratory distress  Breath sounds: Normal breath sounds  Abdominal:      General: Abdomen is flat  Palpations: Abdomen is soft  Tenderness: There is no abdominal tenderness  There is no guarding or rebound  Genitourinary:     General: Normal vulva  Comments: Bright red bleeding present on speculum exam  Clot and products of conception/fetal tissue present within the vaginal vault as well as at the cervical os  This tissue and clot was gently removed with a ringed forceps  Multiple passes needed to remove all clot and tissue  Once cervix visualized, grossly normal and multiparous in appearance  Visually appears 4 cm dilated  Minimal bleeding from external os appreciated  Musculoskeletal:      Cervical back: Neck supple  Skin:     General: Skin is warm and dry  Coloration: Skin is pale  Neurological:      Mental Status: She is alert  Psychiatric:         Behavior: Behavior is cooperative         QBL calculation:  Saturated chucks (241 g) - weight of two chucks (100 g) = 141 g  Pink basin filled with POC and blood (396 g) - weight of pink basin (93g) = 303 g  Total QBL: 141g + 303g = 444 cc    Labs:    Results from last 7 days   Lab Units 09/14/21  0317 09/14/21  0023 09/13/21  1916   WBC Thousand/uL 10 82*  --  7 90   HEMOGLOBIN g/dL 9 7* 7 5* 11 9*   HEMATOCRIT % 29 6* 22 3* 36 7*   PLATELETS Thousands/uL 249  --  275   NEUTROS PCT % 90*  --  65   MONOS PCT % 1*  --  6      Results from last 7 days   Lab Units 21  03121   POTASSIUM mmol/L 4 0 4 0   CHLORIDE mmol/L 111* 106   CO2 mmol/L 19* 21   BUN mg/dL 7 9   CREATININE mg/dL 0 60 0 63   CALCIUM mg/dL 7 1* 9 2   ALK PHOS U/L  --  60   ALT U/L  --  11   AST U/L  --  18              Results from last 7 days   Lab Units 21  03121   INR  1 30* 0 96   PTT seconds 26 26              Imagin) Initial bedside TAUS performed in ED 21:       2)Repeat ED bedside TAUS following evacuation of products of conception in vagina  Endometrial stripe measures 0 9 cm in thickness              US OB limited 21 [facility] ED):   FINDINGS:     UTERUS:  The uterus is anteflexed in position, measuring 11 6 x 6 1 x 6 8 cm  Normal contour and echogenicity  Heterogeneous endometrium measuring up to 1 6 cm in thickness  Focal area of fluid in the lower uterine segment  No evidence of intrauterine pregnancy  Otherwise limited exam of the absence of transvaginal imaging      OVARIES/ADNEXA:        Right ovary: 3 0 x 1 4 x 1 6 cm  Low resistance arterial waveform and venous waveform in the ovarian parenchyma  No mass or cyst      Left ovary:  4 4 x 1 7 x 1 6 cm  Low resistance arterial waveform and venous waveform in the ovarian parenchyma  No mass or cyst      No fluid in the cul-de-sac      IMPRESSION:     Limited transabdominal examination  No evidence of intrauterine pregnancy    Small amount of fluid in the lower uterine segment could represent blood products or products of conception      Bertram Bonilla MD   OB/GYN PGY-2  2021  4:21 AM

## 2021-09-14 NOTE — DISCHARGE INSTRUCTIONS
Miscarriage   WHAT YOU NEED TO KNOW:   A miscarriage is the loss of a fetus within the first 20 weeks of pregnancy  A miscarriage may also be called a spontaneous  or an early pregnancy loss  DISCHARGE INSTRUCTIONS:   Seek care immediately if:   · You have foul-smelling drainage or pus coming from your vagina  · You have heavy vaginal bleeding and soak 1 pad or more in an hour  · You have severe abdominal pain  · You feel like your heart is beating faster than normal     · You feel extremely weak or dizzy  Contact your healthcare provider if:   · You have a fever greater than 100 4°F or chills  · You have extreme sadness, grief, or feel unable to cope with what has happened  · You have questions or concerns about your condition or care  Self-care:   · Do not put anything in your vagina for 2 weeks or as directed  Do not use tampons, douche, or have sex  These actions can cause infection and pain  · Use sanitary pads as needed  You may have light bleeding or spotting for 2 weeks  · Do not take a bath or go swimming for 2 weeks or as directed  These actions may increase your risk for an infection  Take showers only  · Rest as needed  Slowly start to do more each day  Return to your daily activities as directed  · Talk to your healthcare provider about birth control  If you would like to prevent another pregnancy, ask your healthcare provider which type of birth control is best for you  · Join a support group or therapy to help you cope  A miscarriage may be very difficult for you, your partner, and other members of your family  There is no right way to feel after a miscarriage  You may feel overwhelming grief or other emotions  It may be helpful to talk to a friend, family member, or counselor about your feelings  You may worry that you could have another miscarriage  Talk to your healthcare provider about your concerns   He or she may be able to help you reduce the risk for another miscarriage  He or she may also help you find ways to cope with grief  For more information:   · [employer]  [address] [place] [address]  Phone: 5- [phone] - [phone]  Phone: 3- [phone] - [phone]  Web Address: http://[employer]/  org    · [facility]  [address] [facility] , [address] [place] [address]  Web Address: [employer]  Follow up with your healthcare provider as directed: You may need to see your healthcare provider for blood tests or an ultrasound  Write down your questions so you remember to ask them during your visits  © Copyright [facility] 2021 Information is for End User's use only and may not be sold, redistributed or otherwise used for commercial purposes  All illustrations and images included in CareNotes® are the copyrighted property of A D A M , Inc  or [address] [place], [place] [address]snehal   The above information is an  only  It is not intended as medical advice for individual conditions or treatments  Talk to your doctor, nurse or pharmacist before following any medical regimen to see if it is safe and effective for you

## 2021-09-14 NOTE — ED CARE HANDOFF
Emergency Department Sign Out Note        Sign out and transfer of care from Dr Keshia Flores  See Separate Emergency Department note  The patient, Tiffany Birch, was evaluated by the previous provider for threatened , near syncope, continued vaginal bleeding  Workup Completed:  Labs, [address]    ED Course / Workup Pending (followup):    [phone] w/o IUP  Pt still with some vaginal bleeding, but stable  Hgb 12 9  bp stable                                     ED Course as of Sep 14 0204   Tue Sep 14, 2021   0115 BP is 78/40  Pt states she is feeling like she will pass out at times  Hgb dropped from 11 9 to 7 5  Urgent request to lab for blood       0116 Stat call to PACs      0117 Dw Kristina Aquino in [facility]  Made aware of pt's change in clinical status  Need to upgrade pt to priority 1 transfer  Need to update OB/Gyn      0119 BP improved, 96/52      0122 Dw Dr Garret Serrano, OB Gyn  Made aware of the worsening BP, which is now improved, and the decision to hang blood   Other than transfusion no other recommendations       0126 PACs is checking if [facility] or the critical care tuck is available to take this pt       0133 PBCs hung now       0202 [facility] truck here to take the pt         Procedures  MDM    Disposition  Final diagnoses:   Spontaneous miscarriage     Time reflects when diagnosis was documented in both MDM as applicable and the Disposition within this note     Time User Action Codes Description Comment    2021  9:50 PM Georgina Ramesh Add [O03 9] Spontaneous miscarriage       ED Disposition     ED Disposition Condition Date/Time Comment    Transfer to Another [facility] Sep 13, 2021 10:01 PM Tiffany Birch should be transferred out to [facility]          MD Documentation      Most Recent Value   Patient Condition  The patient has been stabilized such that within reasonable medical probability, no material deterioration of the patient condition or the condition of the unborn child(mt) is likely to result from the transfer, An emergency transfer is being made prior to stabilization due to the need for definitive care and the benefit of transfer outweighs the risk   Reason for Transfer  Level of Care needed not available at this facility, No bed available at level of patient's needs   Benefits of Transfer  Specialized equipment and/or services available at the receiving facility (Include comment)________________________, Continuity of care   Risks of Transfer  Potential for delay in receiving treatment, Potential deterioration of medical condition, Loss of IV, Increased discomfort during transfer   Accepting Physician  Evelyn Carrion at [facility] Name, [address]   SL-A    (Name & Tel number)  PAC   Transported by (Company and Unit #)  ALS   Sending MD Margie Olmedo   Provider Certification  General risk, such as traffic hazards, adverse weather conditions, rough terrain or turbulence, possible failure of equipment (including vehicle or aircraft), or consequences of actions of persons outside the control of the transport personnel, Unanticipated needs of medical equipment and personnel during transport, The possibility of a transport vehicle being unavailable      RN Documentation      [address] Name, [address]   SL-A    (Name & Tel number)  PAC   Transported by [employer] and Unit #)  ALS      Follow-up Information    None       Patient's Medications   Discharge Prescriptions    No medications on file     No discharge procedures on file         ED Provider  Electronically Signed by     Kaylan Vang MD  09/14/21 4184

## 2021-09-14 NOTE — PROGRESS NOTES
Brief Progress Note    Hgb 8 4 without symptomatic anemia or ongoing bleeding  As documented in previous progress note and discussed with patient plan for discharge home with outpatient follow up  Discharge orders placed and RN notified      Lamberto Olsen MD  [address] [place] [address]  9/14/2021 10:51 AM

## 2021-09-14 NOTE — EMTALA/ACUTE CARE TRANSFER
[address] [place] [address]  [phone]  Dept: [phone]      EMTALA TRANSFER CONSENT    NAME Javier Lopez                                         1995                              MRN [id number]    I have been informed of my rights regarding examination, treatment, and transfer   by Dr Mohamud Martinez , *    Benefits: Specialized equipment and/or services available at the receiving facility (Include comment)________________________, Continuity of care    Risks: Potential for delay in receiving treatment, Potential deterioration of medical condition, Loss of IV, Increased discomfort during transfer      Consent for Transfer:  I acknowledge that my medical condition has been evaluated and explained to me by the emergency department physician or other qualified medical person and/or my attending physician, who has recommended that I be transferred to the service of  Accepting Physician: Kade Adam at [facility] at [address] Name, [address] : [place]-A  The above potential benefits of such transfer, the potential risks associated with such transfer, and the probable risks of not being transferred have been explained to me, and I fully understand them  The doctor has explained that, in my case, the benefits of transfer outweigh the risks  I agree to be transferred  I authorize the performance of emergency medical procedures and treatments upon me in both transit and upon arrival at the receiving facility  Additionally, I authorize the release of any and all medical records to the receiving facility and request they be transported with me, if possible  I understand that the safest mode of transportation during a medical emergency is an ambulance and that the Hospital advocates the use of this mode of transport   Risks of traveling to the receiving facility by car, including absence of medical control, life sustaining equipment, such as oxygen, and medical personnel has been explained to me and I fully understand them  (BALDOMERO CORRECT BOX BELOW)  [ X ]  I consent to the stated transfer and to be transported by ambulance/helicopter  [  ]  I consent to the stated transfer, but refuse transportation by ambulance and accept full responsibility for my transportation by car  I understand the risks of non-ambulance transfers and I exonerate the Hospital and its staff from any deterioration in my condition that results from this refusal     X___________________________________________    DATE  21  TIME________  Signature of patient or legally responsible individual signing on patient behalf           RELATIONSHIP TO PATIENT_________________________          Provider Certification    NAME Josesito Loving                                         1995                              MRN [id number]    A medical screening exam was performed on the above named patient  Based on the examination:    Condition Necessitating Transfer The encounter diagnosis was Spontaneous miscarriage      Patient Condition: The patient has been stabilized such that within reasonable medical probability, no material deterioration of the patient condition or the condition of the unborn child(mt) is likely to result from the transfer, An emergency transfer is being made prior to stabilization due to the need for definitive care and the benefit of transfer outweighs the risk    Reason for Transfer: Level of Care needed not available at this facility, No bed available at level of patient's needs    Transfer Requirements: Facility -A   · Space available and qualified personnel available for treatment as acknowledged by [facility]  · Agreed to accept transfer and to provide appropriate medical treatment as acknowledged by       Paulina Martin at [facility]  · Appropriate medical records of the examination and treatment of the patient are provided at the time of transfer   STAFF INITIAL WHEN COMPLETED _______  · Transfer will be performed by qualified personnel from [address] [place] [address]  and appropriate transfer equipment as required, including the use of necessary and appropriate life support measures  Provider Certification: I have examined the patient and explained the following risks and benefits of being transferred/refusing transfer to the patient/family:  General risk, such as traffic hazards, adverse weather conditions, rough terrain or turbulence, possible failure of equipment (including vehicle or aircraft), or consequences of actions of persons outside the control of the transport personnel, Unanticipated needs of medical equipment and personnel during transport, The possibility of a transport vehicle being unavailable      Based on these reasonable risks and benefits to the patient and/or the unborn child(mt), and based upon the information available at the time of the patients examination, I certify that the medical benefits reasonably to be expected from the provision of appropriate medical treatments at another medical facility outweigh the increasing risks, if any, to the individuals medical condition, and in the case of labor to the unborn child, from effecting the transfer      X____________________________________________ DATE 09/13/21        TIME_______      ORIGINAL - SEND TO MEDICAL RECORDS   COPY - SEND WITH PATIENT DURING TRANSFER

## 2021-09-14 NOTE — LETTER
[facility]  [address] [facility] [address]  Dept: [phone]    September 14, 2021     Patient: Edda Anthony   YOB: 1995   Date of Visit: 9/13/2021       To Whom it May Concern:    Federico Avila is under my professional care  She was seen in the hospital from 9/13/2021   to 09/14/21  She may return to work on 9/28/21  Her partner Mick Dacosta should be excused from work for up to 3 days  If you have any questions or concerns, please don't hesitate to call           Sincerely,          Christa Santos MD

## 2021-09-14 NOTE — UTILIZATION REVIEW
Initial Clinical Review    Admission: Date/Time/Statement:   Admission Orders (From admission, onward)     Ordered        21 0412  Place in Observation  Once                   Orders Placed This Encounter   Procedures    Place in Observation     Standing Status:   Standing     Number of Occurrences:   1     Order Specific Question:   Level of Care     Answer:   Med Surg [16]     ED Arrival Information     Expected Arrival Acuity    2021 02:57 Emergent         Means of arrival Escorted by Service Admission type    Ambulance [facility] ([address]) OB/GYN Emergency         Arrival complaint    miscarriage        Chief Complaint   Patient presents with    Menorrhagia     Pt transfered from [facility] for heavy vaginal bleeding with large clots  Initial Presentation:   Ms Demetra Pagan is a 24 yo female who presents as a transfer from the ED [facility] [address] [place] [address] to [facility] with c/o vaginal bleeding d/t incomplete   She is [id number] female at 13 weeks 0 days  Bleeding started on   In the ED US showed no evidence of IUP  Pt did have a syncopal episode in the ED at [address] [place] [address]  She was tachycardic and Hypotensive with repeat hgb of 7 5 down from 11 7 when she initially presented to the ED at [address] [place] [address]  She was transfused with 2 U PRBCs and felt improved post transfusion  In [facility] she was still having heavy vaginal bleeding and cramping with suprapubic pain with chills, no fever    had sterile speculum exam showing products of conception and clots in vaginal vault that were removed with ring forceps  Total  cc  Follow-up bedside ultrasound revealed a thin endometrial stripe  She is admitted to OBSERVATION status with Incomplete  - will be monitored for bleeding, trend H/H, if bleeding increases will do D&E, IV doxycycline x 1, IV fluids, NPO, pad count  Probable d/c later today  Hgb this morning is 8 4, down from 9 7 post transfusion           Wt Readings from Last 1 Encounters: 09/14/21 90 7 kg (200 lb)     Additional Vital Signs:     09/14/21 0900  --  90  --  --  100 %  --  --   09/14/21 0620  --  112Abnormal   16  122/57  98 %  None (Room air)  --   09/14/21 0540  98 °F (36 7 °C)  84  16  105/53  100 %  None (Room air)  Sitting   09/14/21 0410  --  --  --  --  --  None (Room air)  --   09/14/21 0405  --  87  18  100/68  98 %  None (Room air)  Lying   09/14/21 0324  --  93  --  99/58  --  --  Lying   09/14/21 0302  97 6 °F (36 4 °C)  89  18  108/65  97 %  None (Room air)  Lying     Pertinent Labs/Diagnostic Test Results:     9/13 US OB - Limited transabdominal examination  No evidence of intrauterine pregnancy  Small amount of fluid in the lower uterine segment could represent blood products or products of conception        Results from last 7 days   Lab Units 09/14/21  0944 09/14/21 0317 09/14/21  0023 09/13/21 1916   WBC Thousand/uL 9 09 10 82*  --  7 90   HEMOGLOBIN g/dL 8 4* 9 7* 7 5* 11 9*   HEMATOCRIT % 25 6* 29 6* 22 3* 36 7*   PLATELETS Thousands/uL 229 249  --  275   NEUTROS ABS Thousands/µL 6 91 9 71*  --  5 10         Results from last 7 days   Lab Units 09/14/21 0317 09/13/21 1916   SODIUM mmol/L 141 137   POTASSIUM mmol/L 4 0 4 0   CHLORIDE mmol/L 111* 106   CO2 mmol/L 19* 21   ANION GAP mmol/L 11 10   BUN mg/dL 7 9   CREATININE mg/dL 0 60 0 63   EGFR ml/min/1 73sq m 129 127   CALCIUM mg/dL 7 1* 9 2     Results from last 7 days   Lab Units 09/13/21 1916   AST U/L 18   ALT U/L 11   ALK PHOS U/L 60   TOTAL PROTEIN g/dL 6 7   ALBUMIN g/dL 4 0   TOTAL BILIRUBIN mg/dL 0 30         Results from last 7 days   Lab Units 09/14/21 0317 09/13/21 1916   GLUCOSE RANDOM mg/dL 172* 109*     Results from last 7 days   Lab Units 09/14/21  0317 09/13/21  1916   PROTIME seconds 15 7* 12 9   INR  1 30* 0 96   PTT seconds 26 26         ED Treatment:   Medication Administration from 09/13/2021 2240 to 09/14/2021 1001    Date/Time Order Dose Route Action   09/14/2021 0455 lactated ringers infusion 125 mL/hr Intravenous New Bag   2021 0456 ondansetron (ZOFRAN) injection 4 mg 4 mg Intravenous Given   2021 0455 doxycycline (VIBRAMYCIN) 200 mg in sodium chloride 0 9 % 250 mL IVPB 200 mg Intravenous New Bag        Past Medical History:   Diagnosis Date    Anemia     Anxiety     Asthma     Mononucleosis     Substance abuse ([facility] [address] )     medical marijuana    Urogenital trichomoniasis     UTI (urinary tract infection)     Varicella     immunized     Present on Admission:   Incomplete     Admitting Diagnosis: Menorrhagia [N92 0]     Age/Sex: 23 y o  female     Admission Orders:  Scheduled Medications:     Continuous IV Infusions:  lactated ringers, 125 mL/hr, Intravenous, Continuous      PRN Meds:  ondansetron, 4 mg, Intravenous, Q6H PRN - x 1     SCDs  Pad count   Continue IV fluids  OOB as brant   NPO with ice       Network Utilization Review Department  ATTENTION: Please call with any questions or concerns to [phone] and carefully listen to the prompts so that you are directed to the right person  All voicemails are confidential   Scott Howell all requests for admission clinical reviews, approved or denied determinations and any other requests to dedicated fax number below belonging to the campus where the patient is receiving treatment   List of dedicated fax numbers for the Facilities:  [address] DENIALS (Administrative/Medical Necessity) [phone]   [address] (Maternity/NICU/Pediatrics) [phone]   [address] [phone]   [address] Leighton Hayes [address] [place] [address] [phone]     [address] [place] [id number] [phone]

## 2021-09-14 NOTE — ED PROCEDURE NOTE
PROCEDURE  CriticalCare Time  Performed by: Bushra Mcpherson MD  Authorized by: Bushra Mcpherson MD     Critical care provider statement:     Critical care time (minutes):  65    Critical care start time:  9/14/2021 12:00 AM    Critical care end time:  9/14/2021 2:04 AM    Critical care time was exclusive of:  Separately billable procedures and treating other patients    Critical care was necessary to treat or prevent imminent or life-threatening deterioration of the following conditions:  Dehydration, shock and circulatory failure    Critical care was time spent personally by me on the following activities:  Examination of patient, evaluation of patient's response to treatment, discussions with primary provider, discussions with consultants, development of treatment plan with patient or surrogate, re-evaluation of patient's condition and ordering and review of laboratory studies         Bushra Mcpherson MD  09/14/21 0205

## 2021-09-14 NOTE — TELEPHONE ENCOUNTER
----- Message from Shashi Bedolla MD sent at 9/14/2021 10:56 AM EDT -----  Regarding: miscarriage  Maxine Campos is our patient who had a 12 week miscarriage today  Can you cancel her initail OB and make her follow up visit a miscarriage follow up visit with anyone in [place] if possible?   Thanks,  Moises Rizvi

## 2021-09-17 ENCOUNTER — TELEPHONE (OUTPATIENT)
Dept: OBGYN CLINIC | Facility: MEDICAL CENTER | Age: 26
End: 2021-09-17

## 2021-09-17 DIAGNOSIS — D62 ACUTE BLOOD LOSS ANEMIA (ABLA): ICD-10-CM

## 2021-09-17 DIAGNOSIS — R42 DIZZINESS: Primary | ICD-10-CM

## 2021-09-17 DIAGNOSIS — G43.809 OTHER MIGRAINE WITHOUT STATUS MIGRAINOSUS, NOT INTRACTABLE: Primary | ICD-10-CM

## 2021-09-17 RX ORDER — SUMATRIPTAN 25 MG/1
25 TABLET, FILM COATED ORAL DAILY PRN
Qty: 5 TABLET | Refills: 0 | Status: SHIPPED | OUTPATIENT
Start: 2021-09-17

## 2021-09-17 NOTE — TELEPHONE ENCOUNTER
Richard Mccall  The patient said she is still having a steady flow of bleeding and she did say she would like to have the lab work you spoke about placed in her chart  Thank you very much for your help

## 2021-09-17 NOTE — TELEPHONE ENCOUNTER
Labs ordered, if she has them done at any of our hospital labs this afternoon we should have results today  I ordered them to be expedited  To recap, patient called office with concern for migraine and dizziness not responsive to tylenol/motrin  She has a recent hx notable for miscarriage requiring procedure and blood transfusion  Hgb post-transfusion 8 6  I advised that recommendations are based on bleeding presently  If still active, recommend STAT labs to assess for worsening anemia  Imitrex sent for migraine symptoms

## 2021-09-18 ENCOUNTER — APPOINTMENT (OUTPATIENT)
Dept: LAB | Facility: HOSPITAL | Age: 26
End: 2021-09-18
Attending: OBSTETRICS & GYNECOLOGY
Payer: COMMERCIAL

## 2021-09-18 DIAGNOSIS — R42 DIZZINESS: ICD-10-CM

## 2021-09-18 DIAGNOSIS — D62 ACUTE BLOOD LOSS ANEMIA (ABLA): ICD-10-CM

## 2021-09-18 LAB
ANION GAP SERPL CALCULATED.3IONS-SCNC: 10 MMOL/L (ref 4–13)
BASOPHILS # BLD AUTO: 0.02 THOUSANDS/ΜL (ref 0–0.1)
BASOPHILS NFR BLD AUTO: 0 % (ref 0–1)
BUN SERPL-MCNC: 6 MG/DL (ref 5–25)
CALCIUM SERPL-MCNC: 8.5 MG/DL (ref 8.3–10.1)
CHLORIDE SERPL-SCNC: 104 MMOL/L (ref 100–108)
CO2 SERPL-SCNC: 27 MMOL/L (ref 21–32)
CREAT SERPL-MCNC: 0.69 MG/DL (ref 0.6–1.3)
EOSINOPHIL # BLD AUTO: 0.09 THOUSAND/ΜL (ref 0–0.61)
EOSINOPHIL NFR BLD AUTO: 2 % (ref 0–6)
ERYTHROCYTE [DISTWIDTH] IN BLOOD BY AUTOMATED COUNT: 14.7 % (ref 11.6–15.1)
GFR SERPL CREATININE-BSD FRML MDRD: 123 ML/MIN/1.73SQ M
GLUCOSE SERPL-MCNC: 88 MG/DL (ref 65–140)
HCT VFR BLD AUTO: 24.7 % (ref 34.8–46.1)
HGB BLD-MCNC: 7.7 G/DL (ref 11.5–15.4)
IMM GRANULOCYTES # BLD AUTO: 0.02 THOUSAND/UL (ref 0–0.2)
IMM GRANULOCYTES NFR BLD AUTO: 0 % (ref 0–2)
LYMPHOCYTES # BLD AUTO: 1.89 THOUSANDS/ΜL (ref 0.6–4.47)
LYMPHOCYTES NFR BLD AUTO: 34 % (ref 14–44)
MCH RBC QN AUTO: 28.1 PG (ref 26.8–34.3)
MCHC RBC AUTO-ENTMCNC: 31.2 G/DL (ref 31.4–37.4)
MCV RBC AUTO: 90 FL (ref 82–98)
MONOCYTES # BLD AUTO: 0.29 THOUSAND/ΜL (ref 0.17–1.22)
MONOCYTES NFR BLD AUTO: 5 % (ref 4–12)
NEUTROPHILS # BLD AUTO: 3.3 THOUSANDS/ΜL (ref 1.85–7.62)
NEUTS SEG NFR BLD AUTO: 59 % (ref 43–75)
NRBC BLD AUTO-RTO: 1 /100 WBCS
PLATELET # BLD AUTO: 327 THOUSANDS/UL (ref 149–390)
PMV BLD AUTO: 9.6 FL (ref 8.9–12.7)
POTASSIUM SERPL-SCNC: 3.8 MMOL/L (ref 3.5–5.3)
RBC # BLD AUTO: 2.74 MILLION/UL (ref 3.81–5.12)
SODIUM SERPL-SCNC: 141 MMOL/L (ref 136–145)
WBC # BLD AUTO: 5.61 THOUSAND/UL (ref 4.31–10.16)

## 2021-09-18 PROCEDURE — 36415 COLL VENOUS BLD VENIPUNCTURE: CPT

## 2021-09-18 PROCEDURE — 85025 COMPLETE CBC W/AUTO DIFF WBC: CPT

## 2021-09-18 PROCEDURE — 80048 BASIC METABOLIC PNL TOTAL CA: CPT

## 2021-09-20 ENCOUNTER — HOSPITAL ENCOUNTER (EMERGENCY)
Facility: HOSPITAL | Age: 26
Discharge: HOME | End: 2021-09-20
Attending: EMERGENCY MEDICINE
Payer: COMMERCIAL

## 2021-09-20 VITALS
SYSTOLIC BLOOD PRESSURE: 117 MMHG | DIASTOLIC BLOOD PRESSURE: 67 MMHG | WEIGHT: 187.39 LBS | BODY MASS INDEX: 33.19 KG/M2 | OXYGEN SATURATION: 99 % | TEMPERATURE: 98.5 F | HEART RATE: 87 BPM | RESPIRATION RATE: 18 BRPM

## 2021-09-20 DIAGNOSIS — D64.9 ANEMIA: ICD-10-CM

## 2021-09-20 DIAGNOSIS — R51.9 HEADACHE: Primary | ICD-10-CM

## 2021-09-20 LAB
ABO GROUP BLD: NORMAL
ANION GAP SERPL CALCULATED.3IONS-SCNC: 10 MMOL/L (ref 4–13)
BASOPHILS # BLD AUTO: 0.01 THOUSANDS/ΜL (ref 0–0.1)
BASOPHILS NFR BLD AUTO: 0 % (ref 0–1)
BLD GP AB SCN SERPL QL: NEGATIVE
BUN SERPL-MCNC: 8 MG/DL (ref 5–25)
CALCIUM SERPL-MCNC: 8.4 MG/DL (ref 8.3–10.1)
CHLORIDE SERPL-SCNC: 105 MMOL/L (ref 100–108)
CO2 SERPL-SCNC: 27 MMOL/L (ref 21–32)
CREAT SERPL-MCNC: 0.75 MG/DL (ref 0.6–1.3)
EOSINOPHIL # BLD AUTO: 0.08 THOUSAND/ΜL (ref 0–0.61)
EOSINOPHIL NFR BLD AUTO: 2 % (ref 0–6)
ERYTHROCYTE [DISTWIDTH] IN BLOOD BY AUTOMATED COUNT: 14.1 % (ref 11.6–15.1)
GFR SERPL CREATININE-BSD FRML MDRD: 113 ML/MIN/1.73SQ M
GLUCOSE SERPL-MCNC: 122 MG/DL (ref 65–140)
HCT VFR BLD AUTO: 26.3 % (ref 34.8–46.1)
HGB BLD-MCNC: 8 G/DL (ref 11.5–15.4)
IMM GRANULOCYTES # BLD AUTO: 0.02 THOUSAND/UL (ref 0–0.2)
IMM GRANULOCYTES NFR BLD AUTO: 0 % (ref 0–2)
LYMPHOCYTES # BLD AUTO: 1.47 THOUSANDS/ΜL (ref 0.6–4.47)
LYMPHOCYTES NFR BLD AUTO: 27 % (ref 14–44)
MCH RBC QN AUTO: 27.2 PG (ref 26.8–34.3)
MCHC RBC AUTO-ENTMCNC: 30.4 G/DL (ref 31.4–37.4)
MCV RBC AUTO: 90 FL (ref 82–98)
MONOCYTES # BLD AUTO: 0.31 THOUSAND/ΜL (ref 0.17–1.22)
MONOCYTES NFR BLD AUTO: 6 % (ref 4–12)
NEUTROPHILS # BLD AUTO: 3.53 THOUSANDS/ΜL (ref 1.85–7.62)
NEUTS SEG NFR BLD AUTO: 65 % (ref 43–75)
NRBC BLD AUTO-RTO: 0 /100 WBCS
PLATELET # BLD AUTO: 343 THOUSANDS/UL (ref 149–390)
PMV BLD AUTO: 9.5 FL (ref 8.9–12.7)
POTASSIUM SERPL-SCNC: 3.7 MMOL/L (ref 3.5–5.3)
RBC # BLD AUTO: 2.94 MILLION/UL (ref 3.81–5.12)
RH BLD: POSITIVE
SODIUM SERPL-SCNC: 142 MMOL/L (ref 136–145)
SPECIMEN EXPIRATION DATE: NORMAL
WBC # BLD AUTO: 5.42 THOUSAND/UL (ref 4.31–10.16)

## 2021-09-20 PROCEDURE — 99283 EMERGENCY DEPT VISIT LOW MDM: CPT

## 2021-09-20 PROCEDURE — 86901 BLOOD TYPING SEROLOGIC RH(D): CPT | Performed by: EMERGENCY MEDICINE

## 2021-09-20 PROCEDURE — 96375 TX/PRO/DX INJ NEW DRUG ADDON: CPT

## 2021-09-20 PROCEDURE — 85025 COMPLETE CBC W/AUTO DIFF WBC: CPT | Performed by: EMERGENCY MEDICINE

## 2021-09-20 PROCEDURE — 99284 EMERGENCY DEPT VISIT MOD MDM: CPT | Performed by: EMERGENCY MEDICINE

## 2021-09-20 PROCEDURE — 80048 BASIC METABOLIC PNL TOTAL CA: CPT | Performed by: EMERGENCY MEDICINE

## 2021-09-20 PROCEDURE — 36415 COLL VENOUS BLD VENIPUNCTURE: CPT | Performed by: EMERGENCY MEDICINE

## 2021-09-20 PROCEDURE — 86850 RBC ANTIBODY SCREEN: CPT | Performed by: EMERGENCY MEDICINE

## 2021-09-20 PROCEDURE — 86900 BLOOD TYPING SEROLOGIC ABO: CPT | Performed by: EMERGENCY MEDICINE

## 2021-09-20 PROCEDURE — 96374 THER/PROPH/DIAG INJ IV PUSH: CPT

## 2021-09-20 RX ORDER — KETOROLAC TROMETHAMINE 30 MG/ML
15 INJECTION, SOLUTION INTRAMUSCULAR; INTRAVENOUS ONCE
Status: COMPLETED | OUTPATIENT
Start: 2021-09-20 | End: 2021-09-20

## 2021-09-20 RX ORDER — METOCLOPRAMIDE HYDROCHLORIDE 5 MG/ML
10 INJECTION INTRAMUSCULAR; INTRAVENOUS ONCE
Status: COMPLETED | OUTPATIENT
Start: 2021-09-20 | End: 2021-09-20

## 2021-09-20 RX ORDER — DIPHENHYDRAMINE HYDROCHLORIDE 50 MG/ML
25 INJECTION INTRAMUSCULAR; INTRAVENOUS ONCE
Status: COMPLETED | OUTPATIENT
Start: 2021-09-20 | End: 2021-09-20

## 2021-09-20 RX ADMIN — SODIUM CHLORIDE 1000 ML: 0.9 INJECTION, SOLUTION INTRAVENOUS at 19:38

## 2021-09-20 RX ADMIN — DIPHENHYDRAMINE HYDROCHLORIDE 25 MG: 50 INJECTION, SOLUTION INTRAMUSCULAR; INTRAVENOUS at 20:07

## 2021-09-20 RX ADMIN — KETOROLAC TROMETHAMINE 15 MG: 30 INJECTION, SOLUTION INTRAMUSCULAR; INTRAVENOUS at 19:39

## 2021-09-20 RX ADMIN — METOCLOPRAMIDE HYDROCHLORIDE 10 MG: 5 INJECTION INTRAMUSCULAR; INTRAVENOUS at 20:07

## 2021-09-20 NOTE — ED PROVIDER NOTES
Final Diagnosis:  1  Headache    2  Anemia        Chief Complaint   Patient presents with    Abnormal Lab     pt states she had a miscarriage on monday night and was given two units of blood  pt was discharged on tuesday and hgb dropped from 8 5 to 7 7 from tuesday to friday  Pts OB sent pt here for blood transfusion     HPI  Patient presents for evaluation of anemia  Patient states that she had a miscarriage on Monday and was seen at another campus later in the week  At that time she had been having significant vaginal bleeding that she felt was going down her legs  She was seen and found to be passing some clots  At that time she was hypotensive, diaphoretic  She states that she kept passing out  She was then transferred to another facility and received 2 units of blood and route  She was then evaluated by OBGYN which cleared clots and likely retained material out of her uterus  She states that her bleeding then improved significantly  Hemoglobin upon leaving the hospital is approximately 8 point 3  She then had repeat labs done that showed it was down to 7 7  She was instructed come into emergency department for further evaluation  Patient denies any feelings of tachycardia or lightheadedness at this time  No palpitations  She states that she does have some soreness in her legs  She also states that she has a headache which has been intermittent in nature  Denies any nausea or vomiting  No abdominal pain  She states that her bleeding has much improved and that now she occasionally gets a little bit of spotting  Unless otherwise specified:  - No language barrier    - History obtained from patient  - There are no limitations to the history obtained    - Previous charting was reviewed    PMH:   has a past medical history of Anemia, Anxiety, Asthma, Miscarriage (9/14/2021), Mononucleosis, Substance abuse ([facility] [address] ), Urogenital trichomoniasis, UTI (urinary tract infection), and Varicella  PSH:   has no past surgical history on file  ROS:  Review of Systems   -   - 13 point ROS was performed and all are normal unless stated in the history above  - Nursing note reviewed  Vitals reviewed  - Orders placed by myself and/or advanced practitioner / resident  PE:   Vitals:    09/20/21 1840 09/20/21 1843 09/20/21 2000   BP:  130/74 117/67   Pulse: 91  87   Resp: 18  18   Temp: 98 5 °F (36 9 °C)     TempSrc: Tympanic     SpO2: 99%  99%   Weight: 85 kg (187 lb 6 3 oz)       Vitals reviewed by me  Unless otherwise specified above:    General: VS reviewed  Appears in NAD    Head: Normocephalic, atraumatic  Eyes: EOM-I  No exudate  ENT: Atraumatic external nose and ears  No malocclusion  No stridor  No drooling  Neck: No JVD  CV: No pallor noted  Lungs:   No tachypnea  No respiratory distress    Abdomen:  Soft, non-tender, non-distended    MSK:   FROM spontaneously  No obvious deformity    Skin: Dry, intact  No obvious rash  Neuro: Awake, alert, GCS15, CN II-XII grossly intact  Speaking in full sentences  Motor grossly intact  Psychiatric/Behavioral: Appropriate mood and affect   Exam: deferred    Physical Exam     A:  - Nursing note reviewed                        No orders to display     Orders Placed This Encounter   Procedures    CBC and differential    Basic metabolic panel    Type and screen     Labs Reviewed   CBC AND DIFFERENTIAL - Abnormal       Result Value Ref Range Status    WBC 5 42  4 31 - 10 16 Thousand/uL Final    RBC 2 94 (*) 3 81 - 5 12 Million/uL Final    Hemoglobin 8 0 (*) 11 5 - 15 4 g/dL Final    Hematocrit 26 3 (*) 34 8 - 46 1 % Final    MCV 90  82 - 98 fL Final    MCH 27 2  26 8 - 34 3 pg Final    MCHC 30 4 (*) 31 4 - 37 4 g/dL Final    RDW 14 1  11 6 - 15 1 % Final    MPV 9 5  8 9 - 12 7 fL Final    Platelets 107  437 - 390 Thousands/uL Final    nRBC 0  /100 WBCs Final    Neutrophils Relative 65  43 - 75 % Final    Immat GRANS % 0 0 - 2 % Final    Lymphocytes Relative 27  14 - 44 % Final    Monocytes Relative 6  4 - 12 % Final    Eosinophils Relative 2  0 - 6 % Final    Basophils Relative 0  0 - 1 % Final    Neutrophils Absolute 3 53  1 85 - 7 62 Thousands/µL Final    Immature Grans Absolute 0 02  0 00 - 0 20 Thousand/uL Final    Lymphocytes Absolute 1 47  0 60 - 4 47 Thousands/µL Final    Monocytes Absolute 0 31  0 17 - 1 22 Thousand/µL Final    Eosinophils Absolute 0 08  0 00 - 0 61 Thousand/µL Final    Basophils Absolute 0 01  0 00 - 0 10 Thousands/µL Final   BASIC METABOLIC PANEL    Sodium 573  136 - 145 mmol/L Final    Potassium 3 7  3 5 - 5 3 mmol/L Final    Chloride 105  100 - 108 mmol/L Final    CO2 27  21 - 32 mmol/L Final    ANION GAP 10  4 - 13 mmol/L Final    BUN 8  5 - 25 mg/dL Final    Creatinine 0 75  0 60 - 1 30 mg/dL Final    Comment: Standardized to IDMS reference method    Glucose 122  65 - 140 mg/dL Final    Comment: If the patient is fasting, the ADA then defines impaired fasting glucose as > 100 mg/dL and diabetes as > or equal to 123 mg/dL  Specimen collection should occur prior to Sulfasalazine administration due to the potential for falsely depressed results  Specimen collection should occur prior to Sulfapyridine administration due to the potential for falsely elevated results      Calcium 8 4  8 3 - 10 1 mg/dL Final    eGFR 113  ml/min/1 73sq m Final    Narrative:     [place] guidelines for Chronic Kidney Disease (CKD):     Stage 1 with normal or high GFR (GFR > 90 mL/min/1 73 square meters)    Stage 2 Mild CKD (GFR = 60-89 mL/min/1 73 square meters)    Stage 3A Moderate CKD (GFR = 45-59 mL/min/1 73 square meters)    Stage 3B Moderate CKD (GFR = 30-44 mL/min/1 73 square meters)    Stage 4 Severe CKD (GFR = 15-29 mL/min/1 73 square meters)    Stage 5 End Stage CKD (GFR <15 mL/min/1 73 square meters)  Note: GFR calculation is accurate only with a steady state creatinine         Final Diagnosis:  1  Headache    2  Anemia        P:  - will evaluate for bleeding with CBC  BMP based on patient's complaints of cramp like symptoms  Provided with fluids and analgesia for headache  Type and screen  -repeat hemoglobin was 8  I discussed with the patient if she would prefer to receive blood here for symptomatic anemia go home  At this time she would feel more comfortable returning to home  States that her headache was improved with Toradol here in emergency department  I discussed with her return precautions as well as signs for bleeding  Follow-up with primary care OBGYN    Medications   sodium chloride 0 9 % bolus 1,000 mL (0 mL Intravenous Stopped 9/20/21 2008)   ketorolac (TORADOL) injection 15 mg (15 mg Intravenous Given 9/20/21 1939)   metoclopramide (REGLAN) injection 10 mg (10 mg Intravenous Given 9/20/21 2007)   diphenhydrAMINE (BENADRYL) injection 25 mg (25 mg Intravenous Given 9/20/21 2007)     Time reflects when diagnosis was documented in both MDM as applicable and the Disposition within this note     Time User Action Codes Description Comment    9/20/2021  7:51 PM Birgit Phoenix Add [R51 9] Headache     9/20/2021  7:53 PM Birgit Phoenix Add [D64 9] Anemia       ED Disposition     ED Disposition Condition Date/Time Comment    Discharge Stable Mon Sep 20, 2021  7:51 PM [address] discharge to home/self care              Follow-up Information     Follow up With Specialties Details Why Contact Info    Your OBGYN            Discharge Medication List as of 9/20/2021  7:54 PM      CONTINUE these medications which have NOT CHANGED    Details   !! albuterol (ProAir HFA) 90 mcg/act inhaler Inhale 2 puffs every 6 (six) hours as needed for wheezing, Starting Thu 9/9/2021, Normal      !! albuterol (Ventolin HFA) 90 mcg/act inhaler Inhale 1-2 puffs every 4 (four) hours as needed for wheezing or shortness of breath, Starting Thu 1/7/2021, Normal      Prenatal MV & Min w/FA-DHA (PRENATAL ADULT GUMMY/DHA/FA PO) Take 2 tablets by mouth daily, Historical Med      sertraline (ZOLOFT) 25 mg tablet Take 1 tablet (25 mg total) by mouth daily, Starting Thu 9/9/2021, Normal      SUMAtriptan (IMITREX) 25 mg tablet Take 1 tablet (25 mg total) by mouth daily as needed for migraine for up to 1 dose, Starting Fri 9/17/2021, Normal       !! - Potential duplicate medications found  Please discuss with provider  No discharge procedures on file  Prior to Admission Medications   Prescriptions Last Dose Informant Patient Reported? Taking? Prenatal MV & Min w/FA-DHA (PRENATAL ADULT GUMMY/DHA/FA PO)  Self Yes No   Sig: Take 2 tablets by mouth daily   SUMAtriptan (IMITREX) 25 mg tablet   No No   Sig: Take 1 tablet (25 mg total) by mouth daily as needed for migraine for up to 1 dose   albuterol (ProAir HFA) 90 mcg/act inhaler   No No   Sig: Inhale 2 puffs every 6 (six) hours as needed for wheezing   albuterol (Ventolin HFA) 90 mcg/act inhaler   No No   Sig: Inhale 1-2 puffs every 4 (four) hours as needed for wheezing or shortness of breath   sertraline (ZOLOFT) 25 mg tablet   No No   Sig: Take 1 tablet (25 mg total) by mouth daily      Facility-Administered Medications: None       Portions of the record may have been created with voice recognition software  Occasional wrong word or "sound a like" substitutions may have occurred due to the inherent limitations of voice recognition software  Read the chart carefully and recognize, using context, where substitutions have occurred      Electronically signed by:  MD Magaly Joseph MD  09/20/21 5031

## 2021-09-23 ENCOUNTER — OFFICE VISIT (OUTPATIENT)
Dept: OBGYN CLINIC | Facility: MEDICAL CENTER | Age: 26
End: 2021-09-23
Payer: COMMERCIAL

## 2021-09-23 VITALS
SYSTOLIC BLOOD PRESSURE: 110 MMHG | WEIGHT: 187 LBS | BODY MASS INDEX: 33.13 KG/M2 | DIASTOLIC BLOOD PRESSURE: 60 MMHG | HEIGHT: 63 IN

## 2021-09-23 DIAGNOSIS — O03.9 MISCARRIAGE: Primary | ICD-10-CM

## 2021-09-23 DIAGNOSIS — G44.229 CHRONIC TENSION-TYPE HEADACHE, NOT INTRACTABLE: ICD-10-CM

## 2021-09-23 DIAGNOSIS — Z30.41 ORAL CONTRACEPTIVE PILL SURVEILLANCE: ICD-10-CM

## 2021-09-23 DIAGNOSIS — D50.0 BLOOD LOSS ANEMIA: ICD-10-CM

## 2021-09-23 DIAGNOSIS — F41.9 ANXIETY: ICD-10-CM

## 2021-09-23 PROBLEM — O48.0 41 WEEKS GESTATION OF PREGNANCY: Status: RESOLVED | Noted: 2019-08-20 | Resolved: 2021-09-23

## 2021-09-23 PROBLEM — Z3A.41 41 WEEKS GESTATION OF PREGNANCY: Status: RESOLVED | Noted: 2019-08-20 | Resolved: 2021-09-23

## 2021-09-23 PROCEDURE — 99214 OFFICE O/P EST MOD 30 MIN: CPT | Performed by: STUDENT IN AN ORGANIZED HEALTH CARE EDUCATION/TRAINING PROGRAM

## 2021-09-23 RX ORDER — SERTRALINE HYDROCHLORIDE 25 MG/1
50 TABLET, FILM COATED ORAL DAILY
Qty: 180 TABLET | Refills: 3 | Status: SHIPPED | OUTPATIENT
Start: 2021-09-23 | End: 2021-12-22 | Stop reason: SDUPTHER

## 2021-09-23 RX ORDER — FERROUS SULFATE TAB EC 324 MG (65 MG FE EQUIVALENT) 324 (65 FE) MG
324 TABLET DELAYED RESPONSE ORAL
Qty: 180 TABLET | Refills: 3 | Status: SHIPPED | OUTPATIENT
Start: 2021-09-23 | End: 2022-09-23

## 2021-09-23 RX ORDER — DESOGESTREL AND ETHINYL ESTRADIOL 21-5 (28)
1 KIT ORAL DAILY
Qty: 84 TABLET | Refills: 3 | Status: SHIPPED | OUTPATIENT
Start: 2021-09-23 | End: 2022-09-23

## 2021-09-23 NOTE — ASSESSMENT & PLAN NOTE
- Reviewed pathology report showing chorionic villi c/w miscarriage  - TA pelvic ultrasound in office today reveals thin endometrial stripe  - Vaginal bleeding is light, discussed anticipatory guidance  - Discussed contraceptive options, patient desires combined OCP  She has no contraindications  Discussed risks, benefits, alternatives, and efficacy  Rx Vitaliy Youngblood to pharmacy   - Start oral ferrous sulfate 324 mg for anemia  - Magnesium oxide for headaches and muscle cramps  - Return for Annual with Pap in 2 months with me in [place]

## 2021-09-23 NOTE — PROGRESS NOTES
[facility] [address] [place] [address]    Assessment/Plan:  Maximiliano Mcginnis is a 23 y o  [id number] who presents for follow up after miscarriage  Miscarriage  - Reviewed pathology report showing chorionic villi c/w miscarriage  - TA pelvic ultrasound in office today reveals thin endometrial stripe  - Vaginal bleeding is light, discussed anticipatory guidance  - Discussed contraceptive options, patient desires combined OCP  She has no contraindications  Discussed risks, benefits, alternatives, and efficacy  Rx Maricruz Yeung to pharmacy   - Start oral ferrous sulfate 324 mg for anemia  - Magnesium oxide for headaches and muscle cramps  - Return for Annual with Pap in 2 months with me in [place]  Diagnoses and all orders for this visit:    Miscarriage    Blood loss anemia  -     ferrous sulfate 324 (65 Fe) mg; Take 1 tablet (324 mg total) by mouth 2 (two) times a day before meals    Oral contraceptive pill surveillance  -     desogestrel-ethinyl estradiol (KARIVA) 0 15-0 02/0 01 MG (21/5) per tablet; Take 1 tablet by mouth daily    Anxiety  -     sertraline (ZOLOFT) 25 mg tablet; Take 2 tablets (50 mg total) by mouth daily    Chronic tension-type headache, not intractable  -     magnesium oxide (MAG-OX) 400 mg; Take 1 tablet (400 mg total) by mouth 2 (two) times a day          Subjective:   Maximiliano Mcginnis is a 23 y o  [id number] female  CC: Bleeding and headache after miscarriage    HPI: Charito Mooney is a 23 y o  [id number] who presents 2 weeks after ED evaluation and emergent [facility] in the setting of first trimester bleeding and SAB at 12 weeks requiring blood transfusion  Her bleeding over the last two weeks has been very light but she has felt dizzy, lightheaded, fatigued and has had headaches and muscle cramps  She was seen again in [facility] ED and her hemoglobin was stable > 8    She was offered blood transfusion for symptomatic anemia but declined  She presents today for follow up  She desires to discuss contraceptive options  ROS: Review of Systems   Constitutional: Negative for chills and fever  Respiratory: Negative for cough and shortness of breath  Cardiovascular: Negative for chest pain and leg swelling  Gastrointestinal: Negative for abdominal pain, nausea and vomiting  Genitourinary: Negative for dysuria, frequency and urgency  Neurological: Negative for dizziness, light-headedness and headaches  PFSH: The following portions of the patient's history were reviewed and updated as appropriate: allergies, current medications, past family history, past medical history, obstetric history, gynecologic history, past social history, past surgical history and problem list        Objective:  /60   Ht 5' 3" (1 6 m)   Wt 84 8 kg (187 lb)   LMP  (LMP Unknown)   BMI 33 13 kg/m²    Physical Exam  Constitutional:       Appearance: Normal appearance  HENT:      Head: Normocephalic and atraumatic  Mouth/Throat:      Mouth: Mucous membranes are moist       Pharynx: Oropharynx is clear  No oropharyngeal exudate  Cardiovascular:      Rate and Rhythm: Normal rate and regular rhythm  Pulses: Normal pulses  Heart sounds: Normal heart sounds  No murmur heard  No friction rub  No gallop  Pulmonary:      Effort: Pulmonary effort is normal  No respiratory distress  Breath sounds: Normal breath sounds  No wheezing, rhonchi or rales  Abdominal:      General: Abdomen is flat  There is no distension  Palpations: Abdomen is soft  There is no mass  Tenderness: There is no abdominal tenderness  Hernia: No hernia is present  Skin:     General: Skin is warm and dry  Neurological:      General: No focal deficit present  Mental Status: She is alert and oriented to person, place, and time     Psychiatric:         Mood and Affect: Mood normal          Behavior: Behavior normal  Bedside US: Thin endometrial stripe    Kristy Kim MD  [address] [place] [address]  9/23/2021 3:57 PM

## 2021-10-12 ENCOUNTER — TELEPHONE (OUTPATIENT)
Dept: OBGYN CLINIC | Facility: MEDICAL CENTER | Age: 26
End: 2021-10-12

## 2021-10-13 DIAGNOSIS — F41.9 ANXIETY: Primary | ICD-10-CM

## 2021-10-20 ENCOUNTER — TELEPHONE (OUTPATIENT)
Dept: PSYCHOLOGY | Facility: CLINIC | Age: 26
End: 2021-10-20

## 2021-10-20 ENCOUNTER — TELEPHONE (OUTPATIENT)
Dept: OBGYN CLINIC | Facility: MEDICAL CENTER | Age: 26
End: 2021-10-20

## 2021-12-22 DIAGNOSIS — F41.9 ANXIETY: ICD-10-CM

## 2021-12-22 RX ORDER — SERTRALINE HYDROCHLORIDE 25 MG/1
50 TABLET, FILM COATED ORAL DAILY
Qty: 180 TABLET | Refills: 3 | Status: SHIPPED | OUTPATIENT
Start: 2021-12-22 | End: 2022-12-22

## 2022-04-04 DIAGNOSIS — N91.2 AMENORRHEA: Primary | ICD-10-CM

## 2022-04-04 NOTE — PROGRESS NOTES
PT Casey Mcfadden stating she had  +UPT  attempted to reach pt  lmov for pt to call the office back to report her LMP:     BLOOD TYPE--0+  HCG & PROGESTERONE ORDERED

## 2022-04-13 ENCOUNTER — APPOINTMENT (OUTPATIENT)
Dept: LAB | Facility: HOSPITAL | Age: 27
End: 2022-04-13
Payer: COMMERCIAL

## 2022-04-13 ENCOUNTER — TELEPHONE (OUTPATIENT)
Dept: OBGYN CLINIC | Facility: CLINIC | Age: 27
End: 2022-04-13

## 2022-04-13 DIAGNOSIS — N91.2 AMENORRHEA: ICD-10-CM

## 2022-04-13 LAB
B-HCG SERPL-ACNC: ABNORMAL MIU/ML
PROGEST SERPL-MCNC: 14 NG/ML

## 2022-04-13 PROCEDURE — 36415 COLL VENOUS BLD VENIPUNCTURE: CPT

## 2022-04-13 PROCEDURE — 84702 CHORIONIC GONADOTROPIN TEST: CPT

## 2022-04-13 PROCEDURE — 84144 ASSAY OF PROGESTERONE: CPT

## 2022-04-13 NOTE — TELEPHONE ENCOUNTER
Entered in Error, pt   Is transferring care and asking for HCG lab work due to her SAB HX, but her labs were already uploaded 4/4 by her previous provider in network so she is going to go for them asap

## 2022-04-25 DIAGNOSIS — N92.6 MISSED MENSES: Primary | ICD-10-CM

## 2022-04-26 ENCOUNTER — HOSPITAL ENCOUNTER (OUTPATIENT)
Dept: ULTRASOUND IMAGING | Facility: HOSPITAL | Age: 27
Discharge: HOME | End: 2022-04-26
Attending: STUDENT IN AN ORGANIZED HEALTH CARE EDUCATION/TRAINING PROGRAM
Payer: COMMERCIAL

## 2022-04-26 DIAGNOSIS — N92.6 MISSED MENSES: ICD-10-CM

## 2022-04-26 PROCEDURE — 76801 OB US < 14 WKS SINGLE FETUS: CPT

## 2022-04-28 ENCOUNTER — TELEPHONE (OUTPATIENT)
Dept: OBGYN CLINIC | Facility: MEDICAL CENTER | Age: 27
End: 2022-04-28

## 2022-04-28 NOTE — TELEPHONE ENCOUNTER
Pt called and lvm in regards to status of Ultrasound results  Lvm for pt to tell her results have not been release and that office will contact her with results once we receive them

## 2022-05-13 ENCOUNTER — INITIAL PRENATAL (OUTPATIENT)
Dept: OBGYN CLINIC | Facility: MEDICAL CENTER | Age: 27
End: 2022-05-13
Payer: COMMERCIAL

## 2022-05-13 VITALS
DIASTOLIC BLOOD PRESSURE: 66 MMHG | WEIGHT: 177 LBS | BODY MASS INDEX: 31.36 KG/M2 | SYSTOLIC BLOOD PRESSURE: 108 MMHG | HEIGHT: 63 IN

## 2022-05-13 DIAGNOSIS — Z34.81 PRENATAL CARE, SUBSEQUENT PREGNANCY, FIRST TRIMESTER: Primary | ICD-10-CM

## 2022-05-13 PROCEDURE — T1001 NURSING ASSESSMENT/EVALUATN: HCPCS

## 2022-05-13 RX ORDER — DULOXETIN HYDROCHLORIDE 30 MG/1
CAPSULE, DELAYED RELEASE ORAL
COMMUNITY
Start: 2022-04-14

## 2022-05-13 NOTE — PROGRESS NOTES
[id number]  OB History    Para Term  AB Living   5 2 2 0 2 2   SAB IAB Ectopic Multiple Live Births   2 0 0 0 2      # Outcome Date GA Lbr Devyn/2nd Weight Sex Delivery Anes PTL Lv   5 Current            4 SAB 2021 8w0d    SAB      3 SAB 2021           2 Term 09/10/19 41w1d / 01:48 3884 g (8 lb 9 oz) M Vag-Spont EPI N CAROL   1 Term 12/10/16 40w3d / 01:47 3780 g (8 lb 5 3 oz) F Vag-Spont EPI N CAROL       * Pt presents for OB intake  *[id number]  *Pt's LMP was Patient's last menstrual period was 2022 (approximate)  *Ultrasound date:2022   8weeks 3days  *Estimated date of delivery: 2022   * confirmed by lmp    *Signs/Symptoms of Pregnancy   *yes Constipation    *no Headaches   *no Cramping  *no Spotting    *no PICA cravings    Diabetes     *no Hx of GDM    *no BMI >35    *no First degree relative with type 2 diabetes     Hypertension-    *no Hx of chronic HTN    *no Hx of gestational HTN   *no hx of preeclampsia, eclampsia, or HELLP syndrome     *Infection Screening   *no Does the pt have a hx of MRSA? *no History of herpes?     *yes History of COVID? *2021    *Immunizations:   *yes Discussed TDaP vaccine   *yes COVID Vaccine *decline    ACTIVE MEDICAL/MENTAL HEALTH CONDITIONS    Depression *yes   Anxiety*yes  Medications*yes- cymbalta      *Interview education   *Handouts given:    *[facility] support center     *[facility]    *Lab Locations    *[facility]ians List/Choosing Pediatrician Sheet    *[facility] Parenting Classes    *Schedule for Prenatal Visits    *Pregnancy Warning Signs Reviewed    *Safe Medications During Pregnancy    *SMA and CF Testing information sheet    *CPT Code Sheet/MFM 13week NT pamphlet    *[employer]        SBIRT Screen:  Depression Screening Follow-up Plan: Patient's depression screening was positive with an [place] score of  15   Does not want a referral as she does see a therapist 2x monthly- Just starting her new medication- Denies self harm     *[facility]   *yes discussed genetic testing- pt interested              Patient has been informed of basic prenatal advice such as avoiding alcohol, drugs, and smoking  She should remain hydrated and take daily prenatal vitamins  Patient should avoid caffeine, raw sprouts, high mercury fish, undercooked fish, raw eggs, organ meat, unwashed produce, and unpasteurized cheeses, milk, and fruit juice and undercooked meats  She has been informed about toxoplasmosis and to avoid cat feces  *Details that I feel the provider should be aware of:  Leana Jiménez was seen for her ob intake at 11w4d  Hx of depression and anxiety- takes cymbalta and speaks with a therapist 2x monthly  Currently uses [employer] and has med marijuana card  Pt did not bring card today so unable to make a copy  MFM referral placed  Prenatal panel ordered  PN1 visit scheduled for *5/25/2022  The patient was oriented to our practice and all questions were answered      Interviewed by:  Ane Lipoma

## 2022-05-20 ENCOUNTER — TELEPHONE (OUTPATIENT)
Dept: PERINATAL CARE | Facility: OTHER | Age: 27
End: 2022-05-20

## 2022-05-20 NOTE — TELEPHONE ENCOUNTER
FYI -  We reached out to this pt and left a message three times    We were unable to schedule the pt for an appointment as indicated in your referral     Zaira Avila Maternal Fetal Medicine  [facility]

## 2022-06-17 ENCOUNTER — OFFICE VISIT (OUTPATIENT)
Dept: URGENT CARE | Facility: MEDICAL CENTER | Age: 27
End: 2022-06-17
Payer: COMMERCIAL

## 2022-06-17 VITALS
HEIGHT: 63 IN | DIASTOLIC BLOOD PRESSURE: 79 MMHG | OXYGEN SATURATION: 98 % | SYSTOLIC BLOOD PRESSURE: 105 MMHG | BODY MASS INDEX: 31.36 KG/M2 | HEART RATE: 98 BPM | WEIGHT: 177 LBS | TEMPERATURE: 98.6 F | RESPIRATION RATE: 18 BRPM

## 2022-06-17 DIAGNOSIS — R39.9 UTI SYMPTOMS: ICD-10-CM

## 2022-06-17 DIAGNOSIS — N39.0 URINARY TRACT INFECTION WITHOUT HEMATURIA, SITE UNSPECIFIED: Primary | ICD-10-CM

## 2022-06-17 LAB
SL AMB  POCT GLUCOSE, UA: ABNORMAL
SL AMB LEUKOCYTE ESTERASE,UA: ABNORMAL
SL AMB POCT BILIRUBIN,UA: ABNORMAL
SL AMB POCT BLOOD,UA: ABNORMAL
SL AMB POCT CLARITY,UA: ABNORMAL
SL AMB POCT COLOR,UA: YELLOW
SL AMB POCT KETONES,UA: ABNORMAL
SL AMB POCT NITRITE,UA: ABNORMAL
SL AMB POCT PH,UA: 6
SL AMB POCT SPECIFIC GRAVITY,UA: 1
SL AMB POCT URINE PROTEIN: ABNORMAL
SL AMB POCT UROBILINOGEN: 0.2

## 2022-06-17 PROCEDURE — S9088 SERVICES PROVIDED IN URGENT: HCPCS | Performed by: PHYSICIAN ASSISTANT

## 2022-06-17 PROCEDURE — 81002 URINALYSIS NONAUTO W/O SCOPE: CPT | Performed by: PHYSICIAN ASSISTANT

## 2022-06-17 PROCEDURE — 87086 URINE CULTURE/COLONY COUNT: CPT | Performed by: PHYSICIAN ASSISTANT

## 2022-06-17 PROCEDURE — 99212 OFFICE O/P EST SF 10 MIN: CPT | Performed by: PHYSICIAN ASSISTANT

## 2022-06-17 RX ORDER — NITROFURANTOIN 25; 75 MG/1; MG/1
100 CAPSULE ORAL 2 TIMES DAILY
Qty: 14 CAPSULE | Refills: 0 | Status: SHIPPED | OUTPATIENT
Start: 2022-06-17 | End: 2022-06-24

## 2022-06-17 NOTE — LETTER
June 17, 2022     Patient: Brynn Becerril   YOB: 1995   Date of Visit: 6/17/2022       To Whom It May Concern: It is my medical opinion that Margarita Galaviz was needed to care for his children while their mother was seeking medical treatment on 06/17/2022  If you have any questions or concerns, please don't hesitate to call           Sincerely,        Katerin Yancey PA-C    CC: Cy Marin

## 2022-06-17 NOTE — PROGRESS NOTES
[facility]        NAME: Rossana Keita is a 27 y o  female  : 1995    MRN: [id number]  DATE: 2022  TIME: 9:33 AM    Assessment and Plan   Urinary tract infection without hematuria, site unspecified [N39 0]  1  Urinary tract infection without hematuria, site unspecified  nitrofurantoin (MACROBID) 100 mg capsule   2  UTI symptoms  Urine culture    POCT urine dip         Patient Instructions       Follow up with PCP in 3-5 days  Proceed to  ER if symptoms worsen  Chief Complaint     Chief Complaint   Patient presents with    Possible UTI     Burning upon urination, abdominal pain, lower back pain , head ache x 2 days           History of Present Illness       Patient presents with a 2 day history of burning on urination, lower abdominal discomfort and low back pain  She is currently 17 weeks pregnant  History of a previous UTI approximately 3 years ago  No fever chills nausea vomiting  Review of Systems   Review of Systems   Constitutional: Negative for chills and fever  Gastrointestinal: Negative for nausea and vomiting  Genitourinary: Positive for dysuria and frequency  Negative for difficulty urinating and flank pain  Musculoskeletal: Positive for back pain           Current Medications       Current Outpatient Medications:     nitrofurantoin (MACROBID) 100 mg capsule, Take 1 capsule (100 mg total) by mouth 2 (two) times a day for 7 days, Disp: 14 capsule, Rfl: 0    albuterol (ProAir HFA) 90 mcg/act inhaler, Inhale 2 puffs every 6 (six) hours as needed for wheezing (Patient not taking: Reported on 2022), Disp: 18 g, Rfl: 0    albuterol (Ventolin HFA) 90 mcg/act inhaler, Inhale 1-2 puffs every 4 (four) hours as needed for wheezing or shortness of breath (Patient not taking: Reported on 2022), Disp: 1 Inhaler, Rfl: 0    desogestrel-ethinyl estradiol (KARIVA) 0 15-0 02/0 01 MG () per tablet, Take 1 tablet by mouth daily (Patient not taking: No sig reported), Disp: 84 tablet, Rfl: 3    DULoxetine (CYMBALTA) 30 mg delayed release capsule, , Disp: , Rfl:     ferrous sulfate 324 (65 Fe) mg, Take 1 tablet (324 mg total) by mouth 2 (two) times a day before meals (Patient not taking: No sig reported), Disp: 180 tablet, Rfl: 3    magnesium oxide (MAG-OX) 400 mg, Take 1 tablet (400 mg total) by mouth 2 (two) times a day (Patient not taking: No sig reported), Disp: 180 tablet, Rfl: 3    sertraline (ZOLOFT) 25 mg tablet, Take 2 tablets (50 mg total) by mouth daily (Patient not taking: No sig reported), Disp: 180 tablet, Rfl: 3    SUMAtriptan (IMITREX) 25 mg tablet, Take 1 tablet (25 mg total) by mouth daily as needed for migraine for up to 1 dose (Patient not taking: No sig reported), Disp: 5 tablet, Rfl: 0    Current Allergies     Allergies as of 06/17/2022 - Reviewed 06/17/2022   Allergen Reaction Noted    Pollen extract Sneezing 07/28/2016            The following portions of the patient's history were reviewed and updated as appropriate: allergies, current medications, past family history, past medical history, past social history, past surgical history and problem list      Past Medical History:   Diagnosis Date    Anemia     Anxiety     Asthma     Depression     History of transfusion     Miscarriage 09/14/2021    Mononucleosis     Substance abuse ([facility] [address] )     medical marijuana    Urogenital trichomoniasis     UTI (urinary tract infection)     Varicella     immunized       No past surgical history on file      Family History   Problem Relation Age of Onset    Heart disease Mother     Hypertension Mother     Skin cancer Mother     Mental illness Mother     No Known Problems Daughter     No Known Problems Son     No Known Problems Maternal Grandmother     No Known Problems Half-Brother     No Known Problems Half-Brother     Depression Half-Sister     Anxiety disorder Half-Sister     Breast cancer Neg Hx     Colon cancer Neg Hx     Ovarian cancer Neg Hx          Medications have been verified  Objective   /79   Pulse 98   Temp 98 6 °F (37 °C)   Resp 18   Ht 5' 3" (1 6 m)   Wt 80 3 kg (177 lb)   LMP 02/21/2022 (Exact Date)   SpO2 98%   BMI 31 35 kg/m²   Patient's last menstrual period was 02/21/2022 (exact date)  Physical Exam     Physical Exam  Vitals and nursing note reviewed  Constitutional:       Appearance: Normal appearance  HENT:      Head: Normocephalic and atraumatic  Cardiovascular:      Rate and Rhythm: Normal rate and regular rhythm  Pulmonary:      Effort: Pulmonary effort is normal    Abdominal:      Comments: No CVA tenderness  Skin:     General: Skin is warm  Neurological:      Mental Status: She is alert

## 2022-06-18 LAB — BACTERIA UR CULT: NORMAL

## 2022-08-12 ENCOUNTER — TELEPHONE (OUTPATIENT)
Dept: OBGYN CLINIC | Facility: MEDICAL CENTER | Age: 27
End: 2022-08-12

## 2022-08-12 NOTE — TELEPHONE ENCOUNTER
Called pt to set up next ob appointment since pt has not been seen since 5/13/2022  Phone number listed is unavailable  Left [employer] message to pt to call office to schedule with us

## 2022-09-12 NOTE — TELEPHONE ENCOUNTER
aving issues with N/V over weekend  States that it has slowed down and is feeling better    Requesting note for work today This document is complete and the patient is ready for discharge.

## 2022-09-21 ENCOUNTER — TELEPHONE (OUTPATIENT)
Dept: OBGYN CLINIC | Facility: MEDICAL CENTER | Age: 27
End: 2022-09-21

## 2022-10-12 ENCOUNTER — OFFICE VISIT (OUTPATIENT)
Dept: URGENT CARE | Facility: MEDICAL CENTER | Age: 27
End: 2022-10-12
Payer: COMMERCIAL

## 2022-10-12 VITALS
HEART RATE: 98 BPM | DIASTOLIC BLOOD PRESSURE: 68 MMHG | HEIGHT: 63 IN | TEMPERATURE: 98 F | OXYGEN SATURATION: 99 % | RESPIRATION RATE: 18 BRPM | SYSTOLIC BLOOD PRESSURE: 120 MMHG | BODY MASS INDEX: 32.78 KG/M2 | WEIGHT: 185 LBS

## 2022-10-12 DIAGNOSIS — J06.9 ACUTE RESPIRATORY DISEASE: Primary | ICD-10-CM

## 2022-10-12 DIAGNOSIS — R05.1 ACUTE COUGH: ICD-10-CM

## 2022-10-12 LAB
SARS-COV-2 AG UPPER RESP QL IA: NEGATIVE
VALID CONTROL: NORMAL

## 2022-10-12 PROCEDURE — 99214 OFFICE O/P EST MOD 30 MIN: CPT | Performed by: PHYSICIAN ASSISTANT

## 2022-10-12 PROCEDURE — 87811 SARS-COV-2 COVID19 W/OPTIC: CPT | Performed by: PHYSICIAN ASSISTANT

## 2022-10-12 PROCEDURE — S9088 SERVICES PROVIDED IN URGENT: HCPCS | Performed by: PHYSICIAN ASSISTANT

## 2022-10-12 RX ORDER — ALBUTEROL SULFATE 90 UG/1
2 AEROSOL, METERED RESPIRATORY (INHALATION) EVERY 6 HOURS PRN
Qty: 8.5 G | Refills: 0 | Status: SHIPPED | OUTPATIENT
Start: 2022-10-12

## 2022-10-12 NOTE — PROGRESS NOTES
[facility]        NAME: James Carvalho is a 27 y o  female  : 1995    MRN: [id number]  DATE: 2022  TIME: 8:47 AM    Assessment and Plan   Acute respiratory disease [J06 9]  1  Acute respiratory disease     2  Acute cough  Poct Covid 19 Rapid Antigen Test    albuterol (ProAir HFA) 90 mcg/act inhaler         Patient Instructions     Albuterol as prescribed  Over the counter cold medication as needed  Fluids and rest (Warm water with honey and lemon)  Tylenol/Ibuprofen for pain fever  Follow up with PCP in 3-5 days  Proceed to  ER if symptoms worsen  Chief Complaint     Chief Complaint   Patient presents with   • Cough     Productive cough of yellow mucous for  the past week, denies sore throat, fever or n/v/d, hx of asthma         History of Present Illness       Cough  This is a new problem  The current episode started in the past 7 days  The cough is productive of sputum  Associated symptoms include ear pain (resolved) and shortness of breath  Pertinent negatives include no chills, fever, headaches, myalgias, postnasal drip, rash, rhinorrhea, sore throat or wheezing  Treatments tried: tylenol  Her past medical history is significant for asthma  Review of Systems   Review of Systems   Constitutional: Negative for chills, fatigue and fever  HENT: Positive for ear pain (resolved)  Negative for congestion, ear discharge, postnasal drip, rhinorrhea, sinus pressure, sinus pain, sneezing, sore throat and trouble swallowing  Respiratory: Positive for shortness of breath  Negative for cough, chest tightness and wheezing  Gastrointestinal: Negative for abdominal pain, diarrhea, nausea and vomiting  Musculoskeletal: Negative for myalgias  Skin: Negative for rash  Neurological: Negative for light-headedness and headaches           Current Medications       Current Outpatient Medications:   •  albuterol (ProAir HFA) 90 mcg/act inhaler, Inhale 2 puffs every 6 (six) hours as needed for shortness of breath, Disp: 8 5 g, Rfl: 0  •  albuterol (ProAir HFA) 90 mcg/act inhaler, Inhale 2 puffs every 6 (six) hours as needed for wheezing (Patient not taking: Reported on 6/17/2022), Disp: 18 g, Rfl: 0  •  albuterol (Ventolin HFA) 90 mcg/act inhaler, Inhale 1-2 puffs every 4 (four) hours as needed for wheezing or shortness of breath (Patient not taking: Reported on 6/17/2022), Disp: 1 Inhaler, Rfl: 0  •  desogestrel-ethinyl estradiol (KARIVA) 0 15-0 02/0 01 MG (21/5) per tablet, Take 1 tablet by mouth daily (Patient not taking: No sig reported), Disp: 84 tablet, Rfl: 3  •  DULoxetine (CYMBALTA) 30 mg delayed release capsule, , Disp: , Rfl:   •  ferrous sulfate 324 (65 Fe) mg, Take 1 tablet (324 mg total) by mouth 2 (two) times a day before meals (Patient not taking: No sig reported), Disp: 180 tablet, Rfl: 3  •  magnesium oxide (MAG-OX) 400 mg, Take 1 tablet (400 mg total) by mouth 2 (two) times a day (Patient not taking: No sig reported), Disp: 180 tablet, Rfl: 3  •  sertraline (ZOLOFT) 25 mg tablet, Take 2 tablets (50 mg total) by mouth daily (Patient not taking: No sig reported), Disp: 180 tablet, Rfl: 3  •  SUMAtriptan (IMITREX) 25 mg tablet, Take 1 tablet (25 mg total) by mouth daily as needed for migraine for up to 1 dose (Patient not taking: No sig reported), Disp: 5 tablet, Rfl: 0    Current Allergies     Allergies as of 10/12/2022 - Reviewed 10/12/2022   Allergen Reaction Noted   • Pollen extract Sneezing 07/28/2016            The following portions of the patient's history were reviewed and updated as appropriate: allergies, current medications, past family history, past medical history, past social history, past surgical history and problem list      Past Medical History:   Diagnosis Date   • Anemia    • Anxiety    • Asthma    • Depression    • History of transfusion    • Miscarriage 09/14/2021   • Mononucleosis    • Substance abuse ([facility] [address] )     medical marijuana   • Urogenital trichomoniasis    • UTI (urinary tract infection)    • Varicella     immunized       History reviewed  No pertinent surgical history  Family History   Problem Relation Age of Onset   • Heart disease Mother    • Hypertension Mother    • Skin cancer Mother    • Mental illness Mother    • No Known Problems Daughter    • No Known Problems Son    • No Known Problems Maternal Grandmother    • No Known Problems Half-Brother    • No Known Problems Half-Brother    • Depression Half-Sister    • Anxiety disorder Half-Sister    • Breast cancer Neg Hx    • Colon cancer Neg Hx    • Ovarian cancer Neg Hx          Medications have been verified  Objective   /68   Pulse 98   Temp 98 °F (36 7 °C)   Resp 18   Ht 5' 3" (1 6 m)   Wt 83 9 kg (185 lb)   LMP 02/21/2022 (Exact Date)   SpO2 99%   BMI 32 77 kg/m²   Patient's last menstrual period was 02/21/2022 (exact date)  Physical Exam     Physical Exam  Vitals reviewed  Constitutional:       General: She is not in acute distress  Appearance: She is well-developed  She is not diaphoretic  HENT:      Head: Normocephalic  Right Ear: Tympanic membrane and external ear normal       Left Ear: Tympanic membrane and external ear normal       Nose: Nose normal       Mouth/Throat:      Pharynx: No oropharyngeal exudate or posterior oropharyngeal erythema  Eyes:      Conjunctiva/sclera: Conjunctivae normal    Cardiovascular:      Rate and Rhythm: Normal rate and regular rhythm  Heart sounds: Normal heart sounds  No murmur heard  No friction rub  No gallop  Pulmonary:      Effort: Pulmonary effort is normal  No respiratory distress  Breath sounds: Normal breath sounds  No wheezing or rales  Chest:      Chest wall: No tenderness  Lymphadenopathy:      Cervical: No cervical adenopathy  Skin:     General: Skin is warm  Neurological:      Mental Status: She is alert and oriented to person, place, and time     Psychiatric: Behavior: Behavior normal          Thought Content:  Thought content normal          Judgment: Judgment normal

## 2022-10-12 NOTE — PATIENT INSTRUCTIONS
Albuterol as prescribed  Over the counter cold medication as needed  Fluids and rest (Warm water with honey and lemon)  Tylenol/Ibuprofen for pain fever  Follow up with PCP in 3-5 days  Proceed to  ER if symptoms worsen

## 2022-11-15 ENCOUNTER — VBI (OUTPATIENT)
Dept: ADMINISTRATIVE | Facility: OTHER | Age: 27
End: 2022-11-15

## 2022-11-17 ENCOUNTER — TELEPHONE (OUTPATIENT)
Dept: OBGYN CLINIC | Facility: MEDICAL CENTER | Age: 27
End: 2022-11-17

## 2022-11-17 DIAGNOSIS — Z3A.38 38 WEEKS GESTATION OF PREGNANCY: Primary | ICD-10-CM

## 2022-11-21 ENCOUNTER — TELEPHONE (OUTPATIENT)
Dept: OBGYN CLINIC | Facility: MEDICAL CENTER | Age: 27
End: 2022-11-21

## 2022-11-21 ENCOUNTER — ROUTINE PRENATAL (OUTPATIENT)
Dept: OBGYN CLINIC | Facility: CLINIC | Age: 27
End: 2022-11-21

## 2022-11-21 ENCOUNTER — APPOINTMENT (OUTPATIENT)
Dept: LAB | Facility: MEDICAL CENTER | Age: 27
End: 2022-11-21

## 2022-11-21 VITALS — WEIGHT: 189.2 LBS | BODY MASS INDEX: 33.52 KG/M2 | DIASTOLIC BLOOD PRESSURE: 70 MMHG | SYSTOLIC BLOOD PRESSURE: 110 MMHG

## 2022-11-21 DIAGNOSIS — Z3A.38 38 WEEKS GESTATION OF PREGNANCY: Primary | ICD-10-CM

## 2022-11-21 DIAGNOSIS — Z3A.38 38 WEEKS GESTATION OF PREGNANCY: ICD-10-CM

## 2022-11-21 DIAGNOSIS — O09.33 NO PRENATAL CARE IN CURRENT PREGNANCY IN THIRD TRIMESTER: ICD-10-CM

## 2022-11-21 DIAGNOSIS — Z34.81 PRENATAL CARE, SUBSEQUENT PREGNANCY, FIRST TRIMESTER: ICD-10-CM

## 2022-11-21 LAB
ABO GROUP BLD: NORMAL
AMORPH URATE CRY URNS QL MICRO: ABNORMAL
BACTERIA UR QL AUTO: ABNORMAL /HPF
BASOPHILS # BLD AUTO: 0.01 THOUSANDS/ÂΜL (ref 0–0.1)
BASOPHILS NFR BLD AUTO: 0 % (ref 0–1)
BILIRUB UR QL STRIP: NEGATIVE
BLD GP AB SCN SERPL QL: NEGATIVE
CLARITY UR: ABNORMAL
COLOR UR: ABNORMAL
EOSINOPHIL # BLD AUTO: 0.04 THOUSAND/ÂΜL (ref 0–0.61)
EOSINOPHIL NFR BLD AUTO: 1 % (ref 0–6)
ERYTHROCYTE [DISTWIDTH] IN BLOOD BY AUTOMATED COUNT: 16.6 % (ref 11.6–15.1)
GLUCOSE UR STRIP-MCNC: NEGATIVE MG/DL
HCT VFR BLD AUTO: 28.7 % (ref 34.8–46.1)
HGB BLD-MCNC: 8 G/DL (ref 11.5–15.4)
HGB UR QL STRIP.AUTO: NEGATIVE
IMM GRANULOCYTES # BLD AUTO: 0.03 THOUSAND/UL (ref 0–0.2)
IMM GRANULOCYTES NFR BLD AUTO: 0 % (ref 0–2)
KETONES UR STRIP-MCNC: NEGATIVE MG/DL
LEUKOCYTE ESTERASE UR QL STRIP: ABNORMAL
LYMPHOCYTES # BLD AUTO: 1.7 THOUSANDS/ÂΜL (ref 0.6–4.47)
LYMPHOCYTES NFR BLD AUTO: 21 % (ref 14–44)
MCH RBC QN AUTO: 20.4 PG (ref 26.8–34.3)
MCHC RBC AUTO-ENTMCNC: 27.9 G/DL (ref 31.4–37.4)
MCV RBC AUTO: 73 FL (ref 82–98)
MONOCYTES # BLD AUTO: 0.54 THOUSAND/ÂΜL (ref 0.17–1.22)
MONOCYTES NFR BLD AUTO: 7 % (ref 4–12)
MUCOUS THREADS UR QL AUTO: ABNORMAL
NEUTROPHILS # BLD AUTO: 5.66 THOUSANDS/ÂΜL (ref 1.85–7.62)
NEUTS SEG NFR BLD AUTO: 71 % (ref 43–75)
NITRITE UR QL STRIP: NEGATIVE
NON-SQ EPI CELLS URNS QL MICRO: ABNORMAL /HPF
NRBC BLD AUTO-RTO: 0 /100 WBCS
PH UR STRIP.AUTO: 6.5 [PH]
PLATELET # BLD AUTO: 288 THOUSANDS/UL (ref 149–390)
PMV BLD AUTO: 11.1 FL (ref 8.9–12.7)
PROT UR STRIP-MCNC: ABNORMAL MG/DL
RBC # BLD AUTO: 3.92 MILLION/UL (ref 3.81–5.12)
RBC #/AREA URNS AUTO: ABNORMAL /HPF
RH BLD: POSITIVE
SP GR UR STRIP.AUTO: 1.02 (ref 1–1.03)
SPECIMEN EXPIRATION DATE: NORMAL
UROBILINOGEN UR STRIP-ACNC: <2 MG/DL
WBC # BLD AUTO: 7.98 THOUSAND/UL (ref 4.31–10.16)
WBC #/AREA URNS AUTO: ABNORMAL /HPF

## 2022-11-21 NOTE — TELEPHONE ENCOUNTER
----- Message from Trisha Alves MD sent at 11/21/2022  2:18 PM EST -----  Regarding: IOL  Santino this patient is a P2 currently  38 weeks  2 days based on 8 weeks scan  She has had  NO prenatal care and no MFM scans   Scheduled for  11/28/22 for third trimester scan  She has significant transportation issues can she be set up for IOL on 11/29   She is already 3 5/ 70/-2 so AM IOL would be good   Thanks

## 2022-11-21 NOTE — PROGRESS NOTES
Assessment Viridiana Pena was seen today for routine prenatal visit  Diagnoses and all orders for this visit:    38 weeks gestation of pregnancy  -     Strep B DNA probe, amplification  -     Chlamydia/GC amplified DNA by PCR  FKC and labor precautions given   States neighbor has agreed to take her to hospital when in labor   Interested in scheduling IOL  - we discussed possible date     Has  Third trimester scan scheduled for   @ [facility]     No prenatal care in current pregnancy in third trimester  - consent for delivery discussed and signed as well as birth plan  Walked over to lab where prenatal panel obtained   1 hr gtt was not able to be completed as lab was about to close - order in chart - states will try to do prior to delivery  Aware UDS and Case management consult will be needed at hospital        [address] is a 24 y o  female here for prenatal visit   She is a  with 2 prior term vaginal deliveries , youngest baby 3 years ago  Had good prenatal care previous pregnancies   This pregnancy she only had dating US back in April and has no care since   States she has no transportation and had tried multiple times to have visit at [facility] but was unable to do so therefore stopped calling   States she feels safe at home and is not in an abusive relationship  Denies drug use   States having good fetal movement , denies LOF or VB , denies contractions  Is interested in scheduling an IOL given her transportation problems   States partner will go for vasectomy but contemplating OCP   States not taking any medication other than her albuterol inhaler if needed  Patient Active Problem List   Diagnosis   • Maternal asthma complicating pregnancy   • Positive urine drug screen   •  (spontaneous vaginal delivery)   • Insufficient prenatal care   • Miscarriage       Gynecologic History  Patient's last menstrual period was 2022 (exact date)    The current method of family planning is none     Past Medical History:   Diagnosis Date   • Anemia    • Anxiety    • Asthma    • Depression    • History of transfusion    • Miscarriage 09/14/2021   • Mononucleosis    • Substance abuse ([facility] [address] )     medical marijuana   • Urogenital trichomoniasis    • UTI (urinary tract infection)    • Varicella     immunized     No past surgical history on file    Family History   Problem Relation Age of Onset   • Heart disease Mother    • Hypertension Mother    • Skin cancer Mother    • Mental illness Mother    • No Known Problems Daughter    • No Known Problems Son    • No Known Problems Maternal Grandmother    • No Known Problems Half-Brother    • No Known Problems Half-Brother    • Depression Half-Sister    • Anxiety disorder Half-Sister    • Breast cancer Neg Hx    • Colon cancer Neg Hx    • Ovarian cancer Neg Hx      Social History     Socioeconomic History   • Marital status: Single     Spouse name: Not on file   • Number of children: Not on file   • Years of education: Not on file   • Highest education level: Not on file   Occupational History   • Not on file   Tobacco Use   • Smoking status: Never   • Smokeless tobacco: Never   Vaping Use   • Vaping Use: Some days   • Substances: Nicotine, THC   Substance and Sexual Activity   • Alcohol use: Not Currently   • Drug use: Not Currently     Types: Marijuana     Comment: medical marijuana  daily   • Sexual activity: Yes     Partners: Male   Other Topics Concern   • Not on file   Social History Narrative   • Not on file     Social Determinants of Health     Financial Resource Strain: Not on file   Food Insecurity: Not on file   Transportation Needs: Not on file   Physical Activity: Not on file   Stress: Not on file   Social Connections: Not on file   Intimate Partner Violence: Not on file   Housing Stability: Not on file     Allergies   Allergen Reactions   • Pollen Extract Sneezing       Current Outpatient Medications:   •  albuterol (ProAir HFA) 90 mcg/act inhaler, Inhale 2 puffs every 6 (six) hours as needed for shortness of breath, Disp: 8 5 g, Rfl: 0  •  albuterol (ProAir HFA) 90 mcg/act inhaler, Inhale 2 puffs every 6 (six) hours as needed for wheezing (Patient not taking: Reported on 6/17/2022), Disp: 18 g, Rfl: 0  •  albuterol (Ventolin HFA) 90 mcg/act inhaler, Inhale 1-2 puffs every 4 (four) hours as needed for wheezing or shortness of breath (Patient not taking: Reported on 6/17/2022), Disp: 1 Inhaler, Rfl: 0  •  desogestrel-ethinyl estradiol (KARIVA) 0 15-0 02/0 01 MG (21/5) per tablet, Take 1 tablet by mouth daily (Patient not taking: No sig reported), Disp: 84 tablet, Rfl: 3  •  DULoxetine (CYMBALTA) 30 mg delayed release capsule, , Disp: , Rfl:   •  ferrous sulfate 324 (65 Fe) mg, Take 1 tablet (324 mg total) by mouth 2 (two) times a day before meals (Patient not taking: No sig reported), Disp: 180 tablet, Rfl: 3  •  magnesium oxide (MAG-OX) 400 mg, Take 1 tablet (400 mg total) by mouth 2 (two) times a day (Patient not taking: No sig reported), Disp: 180 tablet, Rfl: 3  •  sertraline (ZOLOFT) 25 mg tablet, Take 2 tablets (50 mg total) by mouth daily (Patient not taking: No sig reported), Disp: 180 tablet, Rfl: 3  •  SUMAtriptan (IMITREX) 25 mg tablet, Take 1 tablet (25 mg total) by mouth daily as needed for migraine for up to 1 dose (Patient not taking: No sig reported), Disp: 5 tablet, Rfl: 0    Review of Systems  Constitutional :no fever, feels well, no tiredness, no recent weight gain or loss  ENT: no ear ache, no loss of hearing, no nosebleeds or nasal discharge, no sore throat or hoarseness  Cardiovascular: no complaints of slow or fast heart beat, no chest pain, no palpitations, no leg claudication or lower extremity edema    Respiratory: no complaints of shortness of shortness of breath, no BARROS  Breasts:no complaints of breast pain, breast lump, or nipple discharge  Gastrointestinal: no complaints of abdominal pain, constipation, nausea, vomiting, or diarrhea or bloody stools  Genitourinary : as noted in HPI  Musculoskeletal: no complaints of arthralgia, no myalgia, no joint swelling or stiffness, no limb pain or swelling  Integumentary: no complaints of skin rash or lesion, itching or dry skin  Neurological: no complaints of headache, no confusion, no numbness or tingling, no dizziness or fainting     Objective     /70   Wt 85 8 kg (189 lb 3 2 oz)   LMP 02/21/2022 (Exact Date)   BMI 33 52 kg/m²     General:   appears stated age, cooperative, alert normal mood and affect   Lungs: Unlabored breathing     Abdomen: Gravid/ soft depressible , present fetal movement     Vulva: normal   Cervix: 3 5/60/-2   Uterus: gravid non tender     Skin normal skin turgor and no rashes     Psychiatric orientation to person, place, and time: normal  mood and affect: normal

## 2022-11-22 ENCOUNTER — ANESTHESIA (INPATIENT)
Dept: ANESTHESIOLOGY | Facility: HOSPITAL | Age: 27
End: 2022-11-22

## 2022-11-22 ENCOUNTER — HOSPITAL ENCOUNTER (INPATIENT)
Facility: HOSPITAL | Age: 27
LOS: 2 days | Discharge: HOME | End: 2022-11-24
Attending: OBSTETRICS & GYNECOLOGY | Admitting: OBSTETRICS & GYNECOLOGY

## 2022-11-22 ENCOUNTER — PATIENT MESSAGE (OUTPATIENT)
Dept: OBGYN CLINIC | Facility: CLINIC | Age: 27
End: 2022-11-22

## 2022-11-22 ENCOUNTER — ANESTHESIA EVENT (INPATIENT)
Dept: ANESTHESIOLOGY | Facility: HOSPITAL | Age: 27
End: 2022-11-22

## 2022-11-22 DIAGNOSIS — Z3A.38 38 WEEKS GESTATION OF PREGNANCY: ICD-10-CM

## 2022-11-22 DIAGNOSIS — O09.33 INSUFFICIENT PRENATAL CARE IN THIRD TRIMESTER: ICD-10-CM

## 2022-11-22 PROBLEM — O36.8390 FETAL ARRHYTHMIA AFFECTING PREGNANCY, ANTEPARTUM: Status: ACTIVE | Noted: 2022-11-22

## 2022-11-22 PROBLEM — Z3A.39 39 WEEKS GESTATION OF PREGNANCY: Status: ACTIVE | Noted: 2022-11-22

## 2022-11-22 PROBLEM — D64.9 ANEMIA: Status: ACTIVE | Noted: 2022-11-22

## 2022-11-22 LAB
ABO GROUP BLD: NORMAL
AMPHETAMINES SERPL QL SCN: NEGATIVE
BARBITURATES UR QL: NEGATIVE
BENZODIAZ UR QL: NEGATIVE
BLD GP AB SCN SERPL QL: NEGATIVE
C TRACH DNA SPEC QL NAA+PROBE: NEGATIVE
COCAINE UR QL: NEGATIVE
ERYTHROCYTE [DISTWIDTH] IN BLOOD BY AUTOMATED COUNT: 16.8 % (ref 11.6–15.1)
FERRITIN SERPL-MCNC: 5 NG/ML (ref 8–388)
GLUCOSE SERPL-MCNC: 120 MG/DL (ref 65–140)
GLUCOSE SERPL-MCNC: 77 MG/DL (ref 65–140)
HBV SURFACE AG SER QL: NORMAL
HCT VFR BLD AUTO: 27.8 % (ref 34.8–46.1)
HGB BLD-MCNC: 8.2 G/DL (ref 11.5–15.4)
HIV 1+2 AB+HIV1 P24 AG SERPL QL IA: NORMAL
MCH RBC QN AUTO: 21.4 PG (ref 26.8–34.3)
MCHC RBC AUTO-ENTMCNC: 29.5 G/DL (ref 31.4–37.4)
MCV RBC AUTO: 72 FL (ref 82–98)
METHADONE UR QL: NEGATIVE
N GONORRHOEA DNA SPEC QL NAA+PROBE: NEGATIVE
OPIATES UR QL SCN: NEGATIVE
OXYCODONE+OXYMORPHONE UR QL SCN: NEGATIVE
PCP UR QL: NEGATIVE
PLATELET # BLD AUTO: 287 THOUSANDS/UL (ref 149–390)
PMV BLD AUTO: 10.5 FL (ref 8.9–12.7)
RBC # BLD AUTO: 3.84 MILLION/UL (ref 3.81–5.12)
RH BLD: POSITIVE
RPR SER QL: NORMAL
RUBV IGG SERPL IA-ACNC: 77.3 IU/ML
SPECIMEN EXPIRATION DATE: NORMAL
THC UR QL: NEGATIVE
WBC # BLD AUTO: 11.03 THOUSAND/UL (ref 4.31–10.16)

## 2022-11-22 RX ORDER — ROPIVACAINE HYDROCHLORIDE 2 MG/ML
INJECTION, SOLUTION EPIDURAL; INFILTRATION; PERINEURAL CONTINUOUS PRN
Status: DISCONTINUED | OUTPATIENT
Start: 2022-11-22 | End: 2022-11-23 | Stop reason: HOSPADM

## 2022-11-22 RX ORDER — ONDANSETRON 2 MG/ML
4 INJECTION INTRAMUSCULAR; INTRAVENOUS EVERY 4 HOURS PRN
Status: DISCONTINUED | OUTPATIENT
Start: 2022-11-22 | End: 2022-11-23

## 2022-11-22 RX ORDER — LIDOCAINE HYDROCHLORIDE AND EPINEPHRINE 15; 5 MG/ML; UG/ML
INJECTION, SOLUTION EPIDURAL AS NEEDED
Status: DISCONTINUED | OUTPATIENT
Start: 2022-11-22 | End: 2022-11-23 | Stop reason: HOSPADM

## 2022-11-22 RX ORDER — ALBUTEROL SULFATE 90 UG/1
2 AEROSOL, METERED RESPIRATORY (INHALATION) EVERY 6 HOURS PRN
Status: DISCONTINUED | OUTPATIENT
Start: 2022-11-22 | End: 2022-11-23

## 2022-11-22 RX ORDER — ROPIVACAINE HYDROCHLORIDE 2 MG/ML
INJECTION, SOLUTION EPIDURAL; INFILTRATION; PERINEURAL AS NEEDED
Status: DISCONTINUED | OUTPATIENT
Start: 2022-11-22 | End: 2022-11-23 | Stop reason: HOSPADM

## 2022-11-22 RX ORDER — SODIUM CHLORIDE, SODIUM LACTATE, POTASSIUM CHLORIDE, CALCIUM CHLORIDE 600; 310; 30; 20 MG/100ML; MG/100ML; MG/100ML; MG/100ML
125 INJECTION, SOLUTION INTRAVENOUS CONTINUOUS
Status: DISCONTINUED | OUTPATIENT
Start: 2022-11-22 | End: 2022-11-23

## 2022-11-22 RX ORDER — OXYTOCIN/RINGER'S LACTATE 30/500 ML
1-30 PLASTIC BAG, INJECTION (ML) INTRAVENOUS
Status: DISCONTINUED | OUTPATIENT
Start: 2022-11-22 | End: 2022-11-23

## 2022-11-22 RX ORDER — FLUCONAZOLE 200 MG/1
200 TABLET ORAL ONCE
Status: COMPLETED | OUTPATIENT
Start: 2022-11-22 | End: 2022-11-22

## 2022-11-22 RX ADMIN — SODIUM CHLORIDE, POTASSIUM CHLORIDE, SODIUM LACTATE AND CALCIUM CHLORIDE 125 ML/HR: 600; 310; 30; 20 INJECTION, SOLUTION INTRAVENOUS at 17:49

## 2022-11-22 RX ADMIN — LIDOCAINE HYDROCHLORIDE AND EPINEPHRINE 3 ML: 15; 5 INJECTION, SOLUTION EPIDURAL at 20:09

## 2022-11-22 RX ADMIN — ROPIVACAINE HYDROCHLORIDE: 2 INJECTION, SOLUTION EPIDURAL; INFILTRATION at 20:15

## 2022-11-22 RX ADMIN — SODIUM CHLORIDE, POTASSIUM CHLORIDE, SODIUM LACTATE AND CALCIUM CHLORIDE 125 ML/HR: 600; 310; 30; 20 INJECTION, SOLUTION INTRAVENOUS at 19:30

## 2022-11-22 RX ADMIN — ROPIVACAINE HYDROCHLORIDE 10 ML/HR: 2 INJECTION, SOLUTION EPIDURAL; INFILTRATION; PERINEURAL at 20:35

## 2022-11-22 RX ADMIN — Medication 2 MILLI-UNITS/MIN: at 18:07

## 2022-11-22 RX ADMIN — FLUCONAZOLE 200 MG: 200 TABLET ORAL at 19:03

## 2022-11-22 RX ADMIN — ROPIVACAINE HYDROCHLORIDE 10 MG: 2 INJECTION EPIDURAL; INFILTRATION; PERINEURAL at 20:15

## 2022-11-22 NOTE — PATIENT COMMUNICATION
Spoke with patient on the phone, states that she has had LOF since leaving her apt yesterday  Lof continues all morning  Has pos fetal mvmt  No bleeding  Advised to go to [facility]  Agreeable to plan   L&D notified

## 2022-11-22 NOTE — CONSULTS
Consultation - Maternal Fetal Medicine   Lewis Pod 24 y o  female MRN: [id number]  Unit/Bed#: L&D 321-01 Encounter: [id number]  Admit date: 11/22/2022  Today's date: 11/22/22    Assessment/Plan   Ms Karely Castillo is a 24y o  year-old [id number] at [facility] Day: 1, admitted for IOL for limited PNC and suspected macrosomia  By issue:    Fetal arrhythmia affecting pregnancy, antepartum  Assessment & Plan  Arrhythmia found at triage today   Difficult to trace, with baseline intermittently between 60s and 120s  Audible additional beats  Per evaluation by MFM, arrhythmia most likely PAC's with no structural abnormalities  Recommend proceeding with IOL today given term gestation at 39w1d with limited PNC and suspected macrosomia    Positive urine drug screen  Assessment & Plan  UDS pending    Maternal asthma complicating pregnancy  Assessment & Plan  Albuterol ordered    * 39 weeks gestation of pregnancy  Assessment & Plan  Admit   T&S, CBC, RPR  CLD  IV fluids  GBS prophylaxis is not needed, GBS pending  Induction with Pitocin titration         CC: R/o ROM    Physician Requesting Consult: Sheron Landry MD  Reason for Consult / Principal Problem: PAC's, limited prenatal care  Subspeciality: Perinatology    Dear Dr Miri Patrick,    Thank you for referring patient Lewis Myers for Maternal-Fetal Medicine consultation regarding fetal arrhythmia  HPI: As you know, Ms Karely Castillo is a 24y o  year-old [id number] with an MARII of Estimated Date of Delivery: 11/28/22 at 39w1d, Hospital Day: 1, admitted for r/o ROM  Her current pregnancy is significant for limited prenatal care  Her obstetrical history is significant for   Other obstetric review of symptoms:  Contractions: None  Leakage of fluid: None  Bleeding: None  Fetal movement: present  Pertinent items are noted in HPI  Review of Systems   Constitutional: Negative for chills and fever  Eyes: Negative for visual disturbance     Respiratory: Negative for chest tightness and shortness of breath  Cardiovascular: Negative for chest pain and palpitations  Gastrointestinal: Negative for abdominal pain  Genitourinary: Negative for vaginal bleeding, vaginal discharge and vaginal pain  Neurological: Negative for headaches  Other history is as follows:    Historical Information   OB History    Para Term  AB Living   5 2 2 0 2 2   SAB IAB Ectopic Multiple Live Births   2 0 0 0 2      # Outcome Date GA Lbr Devyn/2nd Weight Sex Delivery Anes PTL Lv   5 Current            4 SAB 2021 8w0d    SAB      3 SAB 2021           2 Term 09/10/19 41w1d / 01:48 3884 g (8 lb 9 oz) M Vag-Spont EPI N CAROL   1 Term 12/10/16 40w3d / 01:47 3780 g (8 lb 5 3 oz) F Vag-Spont EPI N CAROL     Gynecologic history: Patient's last menstrual period was 2022 (exact date)  Past Medical History:   Diagnosis Date   • Anemia    • Anxiety    • Asthma    • Depression    • History of transfusion    • Miscarriage 2021   • Mononucleosis    • [facility]     medical marijuana   • Urogenital trichomoniasis    • UTI (urinary tract infection)    • Varicella     immunized     No past surgical history on file  Social History   Social History     Substance and Sexual Activity   Alcohol Use Not Currently     Social History     Substance and Sexual Activity   Drug Use Not Currently   • Types: Marijuana    Comment: medical marijuana  daily     Social History     Tobacco Use   Smoking Status Never   Smokeless Tobacco Never     Family History: non-contributory    Meds/Allergies   Prior to Admission Medications   Prescriptions Last Dose Informant Patient Reported? Taking?    albuterol (ProAir HFA) 90 mcg/act inhaler Unknown  No No   Sig: Inhale 2 puffs every 6 (six) hours as needed for shortness of breath      Facility-Administered Medications: None       Current Facility-Administered Medications   Medication Dose Route Frequency   • albuterol (PROVENTIL HFA,VENTOLIN HFA) inhaler 2 puff  2 puff Inhalation Q6H PRN   • lactated ringers infusion  125 mL/hr Intravenous Continuous   • ondansetron (ZOFRAN) injection 4 mg  4 mg Intravenous Q4H PRN   • oxytocin (PITOCIN) 30 Units in lactated ringers 500 mL infusion  1-30 jeff-units/min Intravenous Titrated     Allergies   Allergen Reactions   • Pollen Extract Sneezing       Objective    Patient Vitals for the past 24 hrs:   BP Temp Temp src Pulse Resp SpO2 Height Weight   11/22/22 1245 137/81 97 8 °F (36 6 °C) Oral 98 18 98 % 5' 3" (1 6 m) 85 7 kg (189 lb)     Vitals: Blood pressure 137/81, pulse 98, temperature 97 8 °F (36 6 °C), temperature source Oral, resp  rate 18, height 5' 3" (1 6 m), weight 85 7 kg (189 lb), last menstrual period 02/21/2022, SpO2 98 %, unknown if currently breastfeeding  Body mass index is 33 48 kg/m²  Physical Exam  HENT:      Head: Normocephalic  Mouth/Throat:      Pharynx: Oropharynx is clear  Eyes:      Conjunctiva/sclera: Conjunctivae normal    Cardiovascular:      Rate and Rhythm: Normal rate  Pulses: Normal pulses  Pulmonary:      Effort: Pulmonary effort is normal    Abdominal:      Palpations: Abdomen is soft  Tenderness: There is no abdominal tenderness  Skin:     General: Skin is warm  Neurological:      Mental Status: She is alert and oriented to person, place, and time     Psychiatric:         Mood and Affect: Mood normal          Prenatal Labs:   Blood Type:   Lab Results   Component Value Date    ABO O 11/22/2022     , D (Rh type):   Lab Results   Component Value Date    RH Positive 11/22/2022     , Antibody Screen: positive   , HCT/HGB:   Lab Results   Component Value Date    HCT 27 8 (L) 11/22/2022    HGB 8 2 (L) 11/22/2022      , Platelets: 307  ,   1 hour Glucola: not done   Rubella: immune    , VDRL/RPR: non-reactive  , Hep B: non-reactive   , HIV: non-reactive   , Group B Strep:  pending    Results from last 7 days   Lab Units 11/22/22  4212 11/21/22  1432   WBC Thousand/uL 11 03* 7 98   NEUTROS ABS Thousands/µL  --  5 66   HEMOGLOBIN g/dL 8 2* 8 0*   MCV fL 72* 73*   PLATELETS Thousands/uL 287 288     Results from last 7 days   Lab Units 11/21/22  1432   NEUTROS PCT % 71   MONOS PCT % 7   EOS PCT % 1           Invalid input(s): GLU, CA      Results from last 7 days   Lab Units 11/22/22  1614 11/21/22  1432   PLATELETS Thousands/uL 287 288                     Results from last 7 days   Lab Units 11/21/22  1432   URINE CULTURE  Culture too young- will reincubate       Fetal data:   130 bpm with moderate variability, accelerations, no decelerations  PAC's audible in the room, thus difficult to trace fetal heart rate during periods of PAC's    [facility] ultrasound report key findings: Please see separate ultrasound report         Andre Olvera MD  11/22/2022  5:31 PM

## 2022-11-22 NOTE — H&P
[address] [place] [address] Fabienne 24 y o  female MRN: [id number]  Unit/Bed#: L&D 321-01 Encounter: [id number]    Assessment: 24 y o  [id number] at 36w3d with limited prenatal care admitted for IOL due to fetal arrhythmia found at triage today  SVE: 4/50/-2  FHT: Baseline approx 120 bpm but difficult to determine with moderate variability and intermittent additional beats, accelerations present, decelerations absent  Clinical EFW: 7 ; Cephalic confirmed by ultrasound  GBS status: pending   Postpartum contraception plan: OCPs    Plan:   Fetal arrhythmia affecting pregnancy, antepartum  Assessment & Plan  Arrhythmia found at triage today   Difficult to trace, with baseline intermittently between 60s and 120s  Audible additional beats  To be evaluated by MFM     Positive urine drug screen  Assessment & Plan  UDS pending    Maternal asthma complicating pregnancy  Assessment & Plan  Albuterol ordered    * 39 weeks gestation of pregnancy  Assessment & Plan  Admit   T&S, CBC, RPR  CLD  IV fluids  GBS prophylaxis is not needed, GBS pending  Induction with Pitocin titration        Discussed case and plan w/ Dr Rafaela Vázquez      Chief Complaint: leaking fluid    HPI: Shreya Duron is a 24 y o  [id number] with an MARII of 11/28/2022, by Last Menstrual Period at 39w1d who is being admitted for fetal arrhythmia discovered today in triage  Patient initially admitted for complaint of vaginal leakage, and found to have yeast infection without rupture of membranes  While in triage, patient was found to have fetal arrhythmia  MFM was consulted, and Dr Yumiko Dyer determined that the patient's gestational age should be determined based on her LMP rather than ultrasound, and therefore her GA today is 39w1d, rather than 38w3d  Therefore, the patient was kept for induction  She complains of intermittent uterine contractions, has light LOF, and reports no VB  She states she has felt good FM      Patient Active Problem List   Diagnosis   • Maternal asthma complicating pregnancy   • Positive urine drug screen   •  (spontaneous vaginal delivery)   • Insufficient prenatal care   • Miscarriage   • 39 weeks gestation of pregnancy   • Fetal arrhythmia affecting pregnancy, antepartum       Baby complications/comments: none    Review of Systems   Constitutional: Negative for chills and fever  Respiratory: Negative for cough and shortness of breath  Cardiovascular: Negative for chest pain and leg swelling  Gastrointestinal: Negative for abdominal pain, nausea and vomiting  Genitourinary: Negative for dysuria, pelvic pain, urgency, vaginal bleeding and vaginal discharge  Neurological: Negative for dizziness, light-headedness and headaches  All other systems reviewed and are negative  OB Hx:  OB History    Para Term  AB Living   5 2 2 0 2 2   SAB IAB Ectopic Multiple Live Births   2 0 0 0 2      # Outcome Date GA Lbr Devyn/2nd Weight Sex Delivery Anes PTL Lv   5 Current            4 SAB 2021 8w0d    SAB      3 SAB 2021           2 Term 09/10/19 41w1d / 01:48 3884 g (8 lb 9 oz) M Vag-Spont EPI N CAROL   1 Term 12/10/16 40w3d / 01:47 3780 g (8 lb 5 3 oz) F Vag-Spont EPI N CAROL       Past Medical Hx:  Past Medical History:   Diagnosis Date   • Anemia    • Anxiety    • Asthma    • Depression    • History of transfusion    • Miscarriage 2021   • Mononucleosis    • [facility] ([facility] )     medical marijuana   • Urogenital trichomoniasis    • UTI (urinary tract infection)    • Varicella     immunized       Past Surgical hx:  No past surgical history on file      Social Hx:  Alcohol use: no  Tobacco use: no  Other substance use: hx of positive UDS    Allergies   Allergen Reactions   • Pollen Extract Sneezing       Medications Prior to Admission   Medication   • albuterol (ProAir HFA) 90 mcg/act inhaler       Objective:  Temp:  [97 8 °F (36 6 °C)] 97 8 °F (36 6 °C)  HR:  [98] 98  Resp:  [18] 18  BP: (137)/(81) 137/81  Body mass index is 33 48 kg/m²  Physical Exam:  Physical Exam  Constitutional:       Appearance: Normal appearance  Cardiovascular:      Rate and Rhythm: Normal rate and regular rhythm  Heart sounds: No murmur heard  No friction rub  No gallop  Pulmonary:      Effort: Pulmonary effort is normal  No respiratory distress  Breath sounds: No wheezing  Abdominal:      Palpations: Abdomen is soft  Tenderness: There is no abdominal tenderness  Musculoskeletal:         General: No swelling or tenderness  Neurological:      Mental Status: She is alert and oriented to person, place, and time  Skin:     General: Skin is warm and dry  Psychiatric:         Mood and Affect: Mood normal    Vitals reviewed  FHT:  Baseline Rate: 135 bpm  Variability: Moderate 6-25 bpm  Accelerations: At variable times, 15 x 15 or greater  Decelerations: None  FHR Category: Category I    TOCO:   Contraction Frequency (minutes): 0  Contraction Duration (seconds): 0  Contraction Quality: Not applicable    Lab Results   Component Value Date    WBC 7 98 11/21/2022    HGB 8 0 (L) 11/21/2022    HCT 28 7 (L) 11/21/2022     11/21/2022     Lab Results   Component Value Date    K 3 7 09/20/2021     09/20/2021    CO2 27 09/20/2021    BUN 8 09/20/2021    CREATININE 0 75 09/20/2021    AST 18 09/13/2021    ALT 11 09/13/2021     Prenatal Labs: Reviewed      Blood type: O Pos  Antibody: Neg  GBS: Pending  HIV: Neg  Rubella: Immune  VDRL/RPR: Non reactive  HBsAg: Negative  Chlamydia: Negative  Gonorrhea: Negative  Diabetes 1 hour screen: not completed  Platelets: 667  Hgb: 8  >2 Midnights  INPATIENT     Signature/Title:  Arnie Dunn MD  Date: 11/22/2022  Time: 4:13 PM

## 2022-11-22 NOTE — PROGRESS NOTES
L&D Triage Note - OB/GYN  Lewis Myers 24 y o  female MRN: [id number]  Unit/Bed#: L&D 321-01 Encounter: [id number]      ASSESSMENT:    Lewis Myers is a 24 y o  [id number] at 38w3d with limited prenatal care presenting to triage with complaint of leaking  R/o ROM negative; however, patient has yeast infection  Patient was found to have arrhythmia and will be evaluated by MFM  Disposition as per MFM  PLAN:    1) R/o ROM  -Absence of pooling on speculum exam  -Ferning negative, nitrazine negative, MAGGIE 4 17 cm  2)Yeast infection  -Clumpy, white discharge on speculum exam  -Presence of hyphae on KOH slide  -Prescription for miconazole  3)Fetal arrhythmia  -NST with baseline approximately 120 bpm with intermittent additional beats, moderate variability, no accelerations or decelerations present  -MFM consulted, and will determine disposition    SUBJECTIVE:    Lewis Myers 24 y o  [id number] at 38w3d with an Estimated Date of Delivery: 12/3/22 presenting with complaint of leakage of fluid that started this morning, and she has felt intermittently throughout the day  Patient only just presented for prenatal visit yesterday after her initial prenatal visit that occurred in May 2022  Patient has also been feeling intermittent contractions   Denies vaginal bleeding, and states that her baby is moving like normal    Her current obstetrical history is significant for limited prenatal care    Her past obstetrical history is significant for  in , ; SAB  with subchorionic hemorrhage    OBJECTIVE:    Vitals:    22 1245   BP: 137/81   Pulse: 98   Resp: 18   Temp: 97 8 °F (36 6 °C)   SpO2: 98%       ROS:  Constitutional: Negative  Respiratory: Negative  Cardiovascular: Negative    Gastrointestinal: Negative    General Physical Exam:  General: in no apparent distress  Cardiovascular: No murmurs  Lungs: non-labored breathing  Abdomen: abdomen is soft without significant tenderness, masses, organomegaly or guarding  Lower extremeties: nontender    Cervical Exam  Speculum: Cervical os is closed; presence of clumpy white discharge  Absence of pooling      SVE:   Cervical Dilation: 4  Cervical Effacement: 50  Fetal Station: -2  Method: Manual  OB Examiner: He    FHT:   Baseline indeterminate with intermittent extra beats; moderate variability, no accelerations or decelerations present    TOCO:    No contractions present    Lab Results   Component Value Date    WBC 7 98 11/21/2022    HGB 8 0 (L) 11/21/2022    HCT 28 7 (L) 11/21/2022     11/21/2022     Lab Results   Component Value Date    K 3 7 09/20/2021     09/20/2021    CO2 27 09/20/2021    BUN 8 09/20/2021    CREATININE 0 75 09/20/2021    AST 18 09/13/2021    ALT 11 09/13/2021       KOH/WTMT:     Infection:   - no clue cells    - presence of hyphae   - no trichomonads present    Membrane status   - no ferning   - neg nitrazene   - no pooling     Imaging:        Abd  US   MAGGIE      - Q1 3 87cm     - Q2 1 82cm     - Q3 2 53cm     - Q4 4 17cm     - Total: 12 39cm    Miguel A Duffy MD,  OBGYN PGY-1  11/22/2022 2:59 PM

## 2022-11-22 NOTE — PROCEDURES
Maurice Monroe, a [id number] at 38w3d with an MARII of 12/3/2022, by Ultrasound, was seen at [facility] for the following procedure(s): $Procedure Type: MAGGIE]         4 Quadrant MAGGIE  MAGGIE Q1 (cm): 3 9 cm  MAGGIE Q2 (cm): 1 8 cm  MAGGIE Q3 (cm): 2 5 cm  MAGGIE Q4 (cm): 4 2 cm  MAGGIE TOTAL (cm): 12 4 cm

## 2022-11-23 LAB
BACTERIA UR CULT: ABNORMAL
BACTERIA UR CULT: ABNORMAL
BASE EXCESS BLDCOV CALC-SCNC: -2.2 MMOL/L (ref 1–9)
GLUCOSE SERPL-MCNC: 72 MG/DL (ref 65–140)
GP B STREP DNA SPEC QL NAA+PROBE: NEGATIVE
HCO3 BLDCOV-SCNC: 22.1 MMOL/L (ref 12.2–28.6)
OXYHGB MFR BLDCOV: 76.4 %
PCO2 BLDCOV: 37.4 MM HG (ref 27–43)
PH BLDCOV: 7.39 [PH] (ref 7.19–7.49)
PO2 BLDCOV: 32.1 MM HG (ref 15–45)
RPR SER QL: NORMAL
SAO2 % BLDCOV: 21.3 ML/DL

## 2022-11-23 PROCEDURE — 4A1HXCZ MONITORING OF PRODUCTS OF CONCEPTION, CARDIAC RATE, EXTERNAL APPROACH: ICD-10-PCS | Performed by: OBSTETRICS & GYNECOLOGY

## 2022-11-23 PROCEDURE — 0KQM0ZZ REPAIR PERINEUM MUSCLE, OPEN APPROACH: ICD-10-PCS | Performed by: OBSTETRICS & GYNECOLOGY

## 2022-11-23 RX ORDER — OXYTOCIN/RINGER'S LACTATE 30/500 ML
250 PLASTIC BAG, INJECTION (ML) INTRAVENOUS ONCE
Status: COMPLETED | OUTPATIENT
Start: 2022-11-23 | End: 2022-11-23

## 2022-11-23 RX ORDER — ACETAMINOPHEN 325 MG/1
650 TABLET ORAL EVERY 4 HOURS PRN
Status: DISCONTINUED | OUTPATIENT
Start: 2022-11-23 | End: 2022-11-24 | Stop reason: HOSPADM

## 2022-11-23 RX ORDER — IBUPROFEN 600 MG/1
600 TABLET ORAL EVERY 6 HOURS
Status: DISCONTINUED | OUTPATIENT
Start: 2022-11-23 | End: 2022-11-24 | Stop reason: HOSPADM

## 2022-11-23 RX ORDER — ONDANSETRON 2 MG/ML
4 INJECTION INTRAMUSCULAR; INTRAVENOUS EVERY 8 HOURS PRN
Status: DISCONTINUED | OUTPATIENT
Start: 2022-11-23 | End: 2022-11-24 | Stop reason: HOSPADM

## 2022-11-23 RX ORDER — SIMETHICONE 80 MG
80 TABLET,CHEWABLE ORAL 4 TIMES DAILY PRN
Status: DISCONTINUED | OUTPATIENT
Start: 2022-11-23 | End: 2022-11-24 | Stop reason: HOSPADM

## 2022-11-23 RX ORDER — CALCIUM CARBONATE 200(500)MG
1000 TABLET,CHEWABLE ORAL DAILY PRN
Status: DISCONTINUED | OUTPATIENT
Start: 2022-11-23 | End: 2022-11-24 | Stop reason: HOSPADM

## 2022-11-23 RX ORDER — DOCUSATE SODIUM 100 MG/1
100 CAPSULE, LIQUID FILLED ORAL 2 TIMES DAILY
Status: DISCONTINUED | OUTPATIENT
Start: 2022-11-23 | End: 2022-11-24 | Stop reason: HOSPADM

## 2022-11-23 RX ORDER — DIPHENHYDRAMINE HCL 25 MG
25 TABLET ORAL EVERY 6 HOURS PRN
Status: DISCONTINUED | OUTPATIENT
Start: 2022-11-23 | End: 2022-11-24 | Stop reason: HOSPADM

## 2022-11-23 RX ORDER — DIAPER,BRIEF,INFANT-TODD,DISP
1 EACH MISCELLANEOUS DAILY PRN
Status: DISCONTINUED | OUTPATIENT
Start: 2022-11-23 | End: 2022-11-24 | Stop reason: HOSPADM

## 2022-11-23 RX ADMIN — IBUPROFEN 600 MG: 600 TABLET, FILM COATED ORAL at 04:04

## 2022-11-23 RX ADMIN — Medication 250 MILLI-UNITS/MIN: at 03:30

## 2022-11-23 RX ADMIN — IRON SUCROSE 200 MG: 20 INJECTION, SOLUTION INTRAVENOUS at 10:16

## 2022-11-23 RX ADMIN — IBUPROFEN 600 MG: 600 TABLET, FILM COATED ORAL at 18:02

## 2022-11-23 RX ADMIN — IBUPROFEN 600 MG: 600 TABLET, FILM COATED ORAL at 10:16

## 2022-11-23 RX ADMIN — DOCUSATE SODIUM 100 MG: 100 CAPSULE, LIQUID FILLED ORAL at 18:02

## 2022-11-23 RX ADMIN — BENZOCAINE AND LEVOMENTHOL 1 APPLICATION: 200; 5 SPRAY TOPICAL at 07:49

## 2022-11-23 RX ADMIN — SODIUM CHLORIDE, POTASSIUM CHLORIDE, SODIUM LACTATE AND CALCIUM CHLORIDE 125 ML/HR: 600; 310; 30; 20 INJECTION, SOLUTION INTRAVENOUS at 03:36

## 2022-11-23 RX ADMIN — DOCUSATE SODIUM 100 MG: 100 CAPSULE, LIQUID FILLED ORAL at 10:16

## 2022-11-23 RX ADMIN — ACETAMINOPHEN 650 MG: 325 TABLET, FILM COATED ORAL at 19:19

## 2022-11-23 NOTE — OB LABOR/OXYTOCIN SAFETY PROGRESS
Oxytocin Safety Progress Check Note - Ilsa Saint 24 y o  female MRN: [id number]    Unit/Bed#: L&D 321-01 Encounter: [id number]    Dose (jeff-units/min) Oxytocin: 4 jeff-units/min  Contraction Frequency (minutes): irregular  Contraction Quality: Moderate  Tachysystole: No   Cervical Dilation: 5        Cervical Effacement: 60  Fetal Station: -2  Baseline Rate: 130 bpm (difficult to determine baseline at times)  Fetal Heart Rate: 130 BPM  FHR Category: Category I     Vital Signs:   Vitals:    11/22/22 2022   BP: 126/87   Pulse: 100   Resp:    Temp:    SpO2:        Notes/comments:     Shahbaz Rodriguez is comfortable after her epidural  On cervical exam, she is 5/60/-2  FHT is intermittently category I, due to fetal PAC  FHT is reassuring when audibly heard in the room  Plan to continue titrating pitocin and AROM with next check  D/W Dr Monserrat Jacques [place], [place] 11/22/2022 9:11 PM

## 2022-11-23 NOTE — L&D DELIVERY NOTE
Delivery Summary - OB/GYN   Juaquin Durán 24 y o  female MRN: [id number]  Unit/Bed#: L&D 321-01 Encounter: [id number]    Pre-delivery Diagnosis:   1  39w2d pregnancy  2  IOL secondary to fetal arrhythmia  3  No prenatal care  4  Anemia-hgb 8 0  5  Asthma  6  GBS unknown    Post-delivery Diagnosis: same, delivered    Attending: Little Fox MD    Assistant(s): Andrew Brock MD    Procedure: , repair of second degree laceration    Anesthesia: epidural    Quantified Blood Loss:  98 mL    Specimens:   1  Arterial and venous cord gases  2  Cord blood  3  Segment of umbilical cord  4  Placenta to pathology     Complications:  None apparent    Findings:  1  Viable female  delivered on 22 at 0319 weighing 8 lbs 15 4oz;  Apgar scores of 8 at one minute and 9 at five minutes  2  Spontaneous delivery of placenta with centrally inserted 3-vessel cord  3  2nd degree perineal laceration, repaired with 2-0Vicryl rapide       Disposition: Patient tolerated the procedure well and was recovering in labor and delivery room with family and  before being transferred to the post-partum floor  Procedure Details     Description of procedure    After pushing for 4 minutes, on 22 at [address] patient delivered a viable female , weighing 4065g, Apgars of 8 (1 min) and 9 (5 min)  The fetal vertex delivered spontaneously  There was a nuchal cord  This was easily reduced at the perineum  The anterior (right) shoulder delivered atraumatically with maternal expulsive forces and the assistance of downward traction  The posterior shoulder delivered with maternal expulsive forces and the assistance of upward traction  The remainder of the fetus delivered spontaneously  Upon delivery, the infant was placed on the mothers abdomen and the cord was clamped and cut  Delayed cord clamping was achieved  The infant was noted to cry spontaneously and was moving all extremities appropriately   There was no evidence for injury  Awaiting nurse resuscitators evaluated the  at bedside  Arterial and venous cord blood gases and cord blood was collected for analysis  These were promptly sent to the lab  In the immediate post-partum, 30 units of IV pitocin was administered and the uterus was noted to contract down well with massage and pitocin  The placenta delivered spontaneously at 0323 and was noted to have a centrally inserted 3 vessel cord  The vagina, cervix, and perineum were inspected and there was noted to be a second degree laceration  Laceration Repair  The vagina, cervix, perineum, and rectum were inspected and there was noted to be a 2nd degree laceration which was repaired with 2-0 vicryl rapide  The patient was comfortable with epidural at the time  The vaginal laceration was identified and an anchoring suture was placed 1 cm above the apex  The vaginal mucosa and underlying rectovaginal fascia were closed in a running locked fashion to the hymenal ring  The suture was then brought underneath the hymenal ring  Continuing with the same suture, the transverse perineal muscles were reapproximated with 1 transverse running sutures  The suture was brought to the posterior apex of the skin laceration and then the skin was reapproximated in a subcuticular fashion to the hymenal ring  Patient was comfortable with epidural at that time    At the conclusion of the delivery, all needle, sponge, and instrument counts were noted to be correct  Patient tolerated the procedure well and was allowed to recover in labor and delivery room with family and  before being transferred to the post-partum floor       David Velazquez MD

## 2022-11-23 NOTE — OB LABOR/OXYTOCIN SAFETY PROGRESS
Oxytocin Safety Progress Check Note - Destin Chacko 24 y o  female MRN: [id number]    Unit/Bed#: L&D 321-01 Encounter: [id number]    Dose (jeff-units/min) Oxytocin: 8 jeff-units/min  Contraction Frequency (minutes): 2 5-3  Contraction Quality: Moderate  Tachysystole: No   Cervical Dilation: 5        Cervical Effacement: 60  Fetal Station: -2  Baseline Rate: 130 bpm  Fetal Heart Rate: 130 BPM  FHR Category: Category I    Vital Signs:   Vitals:    11/22/22 2151   BP: 127/64   Pulse: 89   Resp:    Temp:    SpO2:        Notes/comments:     Kun Lo is feeling well with her contractions  On cervical exam, she is unchanged  FHT is category I  Plan to AROM with next check  D/W Dr Amy Aguirre [place], [place] 11/22/2022 10:46 PM

## 2022-11-23 NOTE — OB LABOR/OXYTOCIN SAFETY PROGRESS
Oxytocin Safety Progress Check Note - Manuel Broderick 24 y o  female MRN: [id number]    Unit/Bed#: L&D 321-01 Encounter: [id number]    Dose (jeff-units/min) Oxytocin: 8 jeff-units/min  Contraction Frequency (minutes): 2-3  Contraction Quality: Moderate  Tachysystole: No   Cervical Dilation: 6        Cervical Effacement: 70  Fetal Station: -2  Baseline Rate: 120 bpm  Fetal Heart Rate: 130 BPM  FHR Category: Category I               Vital Signs:   Vitals:    11/22/22 2351   BP: 112/59   Pulse: 80   Resp:    Temp:    SpO2:        Notes/comments:   Pt comfortable with epidural  Membranes stripped and then AROM performed for pink amniotic fluid  Continue destiny Brunner MD 11/23/2022 12:29 AM

## 2022-11-23 NOTE — ANESTHESIA POSTPROCEDURE EVALUATION
Post-Op Assessment Note    CV Status:  Stable    Pain management: adequate     Mental Status:  Alert and awake   Hydration Status:  Euvolemic   PONV Controlled:  Controlled   Airway Patency:  Patent      Post Op Vitals Reviewed: Yes        Post-op block assessment: catheter intact and no complications      No notable events documented      BP      Temp      Pulse     Resp      SpO2      /70   Pulse 88   Temp 97 8 °F (36 6 °C) (Oral)   Resp 18   Ht 5' 3" (1 6 m)   Wt 85 7 kg (189 lb)   LMP 02/21/2022 (Exact Date)   SpO2 98%   BMI 33 48 kg/m²

## 2022-11-23 NOTE — OB LABOR/OXYTOCIN SAFETY PROGRESS
Oxytocin Safety Progress Check Note - Nayana Mccall 24 y o  female MRN: [id number]    Unit/Bed#: L&D 321-01 Encounter: [id number]    Dose (jeff-units/min) Oxytocin: 8 jeff-units/min  Contraction Frequency (minutes): 2-4  Contraction Quality: Moderate  Tachysystole: No   Cervical Dilation: 8        Cervical Effacement: 90  Fetal Station: 0  Baseline Rate: 125 bpm  Fetal Heart Rate: 130 BPM  FHR Category: Category II (secondary to FHR arrythmia)               Vital Signs:   Vitals:    11/22/22 2351   BP: 112/59   Pulse: 80   Resp:    Temp:    SpO2:        Notes/comments:   Pt complains of increasing vaginal pressure with urge to urinate with contractions  8/90/0   Reassurance provided  Continue current management      Price Craft MD 11/23/2022 2:10 AM

## 2022-11-23 NOTE — NURSING NOTE
Fetal arrhythmia noted by MFM on admission  Spotty tracing on external US for duration of nurses shift  HR tracing in the 60s at times, MD aware of spotty tracing and HR tracing in the 60s  Noted that spotty tracing was to be expected due to arrhythmia  Nurse in to reposition patient and readjust fetal US

## 2022-11-23 NOTE — PLAN OF CARE
Problem: PAIN - ADULT  Goal: Verbalizes/displays adequate comfort level or baseline comfort level  Description: Interventions:  - Encourage patient to monitor pain and request assistance  - Assess pain using appropriate pain scale  - Administer analgesics based on type and severity of pain and evaluate response  - Implement non-pharmacological measures as appropriate and evaluate response  - Consider cultural and social influences on pain and pain management  - Notify physician/advanced practitioner if interventions unsuccessful or patient reports new pain  Outcome: Progressing     Problem: INFECTION - ADULT  Goal: Absence or prevention of progression during hospitalization  Description: INTERVENTIONS:  - Assess and monitor for signs and symptoms of infection  - Monitor lab/diagnostic results  - Monitor all insertion sites, i e  indwelling lines, tubes, and drains  - Monitor endotracheal if appropriate and nasal secretions for changes in amount and color  - [place] appropriate cooling/warming therapies per order  - Administer medications as ordered  - Instruct and encourage patient and family to use good hand hygiene technique  - Identify and instruct in appropriate isolation precautions for identified infection/condition  Outcome: Progressing  Goal: Absence of fever/infection during neutropenic period  Description: INTERVENTIONS:  - Monitor WBC    Outcome: Progressing     Problem: SAFETY ADULT  Goal: Patient will remain free of falls  Description: INTERVENTIONS:  - Educate patient/family on patient safety including physical limitations  - Instruct patient to call for assistance with activity   - Consult OT/PT to assist with strengthening/mobility   - Keep Call bell within reach  - Keep bed low and locked with side rails adjusted as appropriate  - Keep care items and personal belongings within reach  - Initiate and maintain comfort rounds  - Make Fall Risk Sign visible to staff  - Offer Toileting every  Hours, in advance of need  - Initiate/Maintain alarm  - Obtain necessary fall risk management equipment:   - Apply yellow socks and bracelet for high fall risk patients  - Consider moving patient to room near nurses station  Outcome: Progressing  Goal: Maintain or return to baseline ADL function  Description: INTERVENTIONS:  -  Assess patient's ability to carry out ADLs; assess patient's baseline for ADL function and identify physical deficits which impact ability to perform ADLs (bathing, care of mouth/teeth, toileting, grooming, dressing, etc )  - Assess/evaluate cause of self-care deficits   - Assess range of motion  - Assess patient's mobility; develop plan if impaired  - Assess patient's need for assistive devices and provide as appropriate  - Encourage maximum independence but intervene and supervise when necessary  - Involve family in performance of ADLs  - Assess for home care needs following discharge   - Consider OT consult to assist with ADL evaluation and planning for discharge  - Provide patient education as appropriate  Outcome: Progressing  Goal: Maintains/Returns to pre admission functional level  Description: INTERVENTIONS:  - Perform BMAT or MOVE assessment daily    - Set and communicate daily mobility goal to care team and patient/family/caregiver  - Collaborate with rehabilitation services on mobility goals if consulted  - Perform Range of Motion  times a day  - Reposition patient every  hours    - Dangle patient  times a day  - Stand patient  times a day  - Ambulate patient  times a day  - Out of bed to chair  times a day   - Out of bed for meals times a day  - Out of bed for toileting  - Record patient progress and toleration of activity level   Outcome: Progressing     Problem: Knowledge Deficit  Goal: Patient/family/caregiver demonstrates understanding of disease process, treatment plan, medications, and discharge instructions  Description: Complete learning assessment and assess knowledge base   Interventions:  - Provide teaching at level of understanding  - Provide teaching via preferred learning methods  Outcome: Progressing     Problem: DISCHARGE PLANNING  Goal: Discharge to home or other facility with appropriate resources  Description: INTERVENTIONS:  - Identify barriers to discharge w/patient and caregiver  - Arrange for needed discharge resources and transportation as appropriate  - Identify discharge learning needs (meds, wound care, etc )  - Arrange for interpretive services to assist at discharge as needed  - Refer to Case Management Department for coordinating discharge planning if the patient needs post-hospital services based on physician/advanced practitioner order or complex needs related to functional status, cognitive ability, or social support system  Outcome: Progressing

## 2022-11-23 NOTE — OB LABOR/OXYTOCIN SAFETY PROGRESS
Oxytocin Safety Progress Check Note - Allen Pope 24 y o  female MRN: [id number]    Unit/Bed#: L&D 321-01 Encounter: [id number]    Dose (jeff-units/min) Oxytocin: 8 jeff-units/min  Contraction Frequency (minutes): 2-4  Contraction Quality: Moderate  Tachysystole: No   Cervical Dilation: 8-9        Cervical Effacement: 90  Fetal Station: 0  Baseline Rate: 120 bpm  Fetal Heart Rate: 130 BPM  FHR Category: Category I               Vital Signs:   Vitals:    11/23/22 0206   BP: 139/68   Pulse: 97   Resp:    Temp:    SpO2:        Notes/comments:   Pt complains of urge to push  Advised she is 8-9/90/0  She is feeling pressure of the fetal head  Continue current management           Dianne Motta MD 11/23/2022 2:36 AM

## 2022-11-23 NOTE — ANESTHESIA PROCEDURE NOTES
Epidural Block    Patient location during procedure: OB  Start time: 11/22/2022 8:08 PM  Reason for block: at surgeon's request and primary anesthetic  Staffing  Anesthesiologist: Efra Mahan DO  Preanesthetic Checklist  Completed: patient identified, IV checked, site marked, risks and benefits discussed, surgical consent, monitors and equipment checked, pre-op evaluation and timeout performed  Epidural  Patient position: sitting  Prep: Betadine  Patient monitoring: heart rate and frequent blood pressure checks  Approach: midline  Location: lumbar  Injection technique: HAROON air  Needle  Needle type: Tuohy   Needle gauge: 18 G  Catheter type: side hole  Catheter size: 20 G  Catheter at skin depth: 8 cm  Catheter securement method: clear occlusive dressing  Test dose: negative  Assessment  Number of attempts: 1negative aspiration for CSF, negative aspiration for heme and no paresthesia on injection  patient tolerated the procedure well with no immediate complications

## 2022-11-24 VITALS
RESPIRATION RATE: 18 BRPM | SYSTOLIC BLOOD PRESSURE: 117 MMHG | TEMPERATURE: 98 F | HEART RATE: 55 BPM | BODY MASS INDEX: 33.49 KG/M2 | DIASTOLIC BLOOD PRESSURE: 57 MMHG | WEIGHT: 189 LBS | HEIGHT: 63 IN | OXYGEN SATURATION: 97 %

## 2022-11-24 PROBLEM — O03.9 MISCARRIAGE: Status: RESOLVED | Noted: 2021-09-14 | Resolved: 2022-11-24

## 2022-11-24 PROBLEM — R82.5 POSITIVE URINE DRUG SCREEN: Status: RESOLVED | Noted: 2019-03-28 | Resolved: 2022-11-24

## 2022-11-24 LAB
ABO GROUP BLD BPU: NORMAL
ABO GROUP BLD BPU: NORMAL
BPU ID: NORMAL
BPU ID: NORMAL
CROSSMATCH: NORMAL
CROSSMATCH: NORMAL
UNIT DISPENSE STATUS: NORMAL
UNIT DISPENSE STATUS: NORMAL
UNIT PRODUCT CODE: NORMAL
UNIT PRODUCT CODE: NORMAL
UNIT PRODUCT VOLUME: 350 ML
UNIT PRODUCT VOLUME: 350 ML
UNIT RH: NORMAL
UNIT RH: NORMAL

## 2022-11-24 RX ORDER — IBUPROFEN 600 MG/1
600 TABLET ORAL EVERY 6 HOURS
Qty: 30 TABLET | Refills: 0
Start: 2022-11-24

## 2022-11-24 RX ORDER — DOCUSATE SODIUM 100 MG/1
100 CAPSULE, LIQUID FILLED ORAL 2 TIMES DAILY
Refills: 0
Start: 2022-11-24

## 2022-11-24 RX ORDER — ACETAMINOPHEN 325 MG/1
325 TABLET ORAL EVERY 4 HOURS PRN
Refills: 0
Start: 2022-11-24

## 2022-11-24 RX ADMIN — IBUPROFEN 600 MG: 600 TABLET, FILM COATED ORAL at 06:03

## 2022-11-24 RX ADMIN — IBUPROFEN 600 MG: 600 TABLET, FILM COATED ORAL at 00:01

## 2022-11-24 NOTE — PROGRESS NOTES
Obstetrics Progress Note  Vinod Covarrubias 24 y o  female MRN: [id number]  Unit/Bed#: L&D 305-01 Encounter: [id number]    Assessment/Plan:  Postpartum Day #1 s/p spontaneous vaginal delivery  Stable  Baby in room  By issue:  Anemia  Assessment & Plan  Microcytic anemia with hemoglobin of 8g/dL noted on admission  Status post 1 dose of Venofer  Recommend oral iron supplementation on discharge    Fetal arrhythmia affecting pregnancy, antepartum  Assessment & Plan  Attributed to premature atrial contractions    Insufficient prenatal care  Assessment & Plan  Follow-up case management consultation    Maternal asthma complicating pregnancy  Assessment & Plan  Albuterol as needed    *  (spontaneous vaginal delivery)  Assessment & Plan  Continue routine post partum care  Encourage ambulation  Formula feeding only      Positive urine drug screen-resolved as of 2022  Assessment & Plan  UDS pending      Anticipate discharge postpartum day 2  Subjective/Objective   Chief Complaint:   Post delivery     Subjective:   Pain:  Control  Tolerating PO: yes  Voiding: yes  Flatus: yes  Ambulating: yes  Chest pain: no  Shortness of breath: no  Leg pain: no  Lochia: within normal limits  Infant feeding:  Formula  Objective:   Vitals:   Temp:  [97 4 °F (36 3 °C)-98 °F (36 7 °C)] 98 °F (36 7 °C)  HR:  [55-95] 55  Resp:  [18] 18  BP: (102-117)/(57-63) 117/57     No intake or output data in the 24 hours ending 22 0759    Lab Results   Component Value Date    WBC 11 03 (H) 2022    HGB 8 2 (L) 2022    HCT 27 8 (L) 2022    MCV 72 (L) 2022     2022       Physical Exam:   General: alert and oriented x3, in no apparent distress  Cardiovascular: regular rate  Pulmonary: normal effort  Abdomen: Soft, non-tender, non-distended, no rebound or guarding  Uterine fundus firm and non-tender, -1 cm below the umbilicus   Extremities: Non tender     Doreen CELE   [facility], [address], OBGYN  2022, 7:59 AM

## 2022-11-24 NOTE — NURSING NOTE
Discharge education completed with pt  "Save Your Life" magnet explained and given to pt  All handouts reviewed  Pt verbalizes understanding

## 2022-11-24 NOTE — PLAN OF CARE
Problem: PAIN - ADULT  Goal: Verbalizes/displays adequate comfort level or baseline comfort level  Description: Interventions:  - Encourage patient to monitor pain and request assistance  - Assess pain using appropriate pain scale  - Administer analgesics based on type and severity of pain and evaluate response  - Implement non-pharmacological measures as appropriate and evaluate response  - Consider cultural and social influences on pain and pain management  - Notify physician/advanced practitioner if interventions unsuccessful or patient reports new pain  Outcome: Progressing     Problem: INFECTION - ADULT  Goal: Absence or prevention of progression during hospitalization  Description: INTERVENTIONS:  - Assess and monitor for signs and symptoms of infection  - Monitor lab/diagnostic results  - Monitor all insertion sites, i e  indwelling lines, tubes, and drains  - Monitor endotracheal if appropriate and nasal secretions for changes in amount and color  - [place] appropriate cooling/warming therapies per order  - Administer medications as ordered  - Instruct and encourage patient and family to use good hand hygiene technique  - Identify and instruct in appropriate isolation precautions for identified infection/condition  Outcome: Progressing  Goal: Absence of fever/infection during neutropenic period  Description: INTERVENTIONS:  - Monitor WBC    Outcome: Progressing     Problem: SAFETY ADULT  Goal: Patient will remain free of falls  Description: INTERVENTIONS:  - Educate patient/family on patient safety including physical limitations  - Instruct patient to call for assistance with activity   - Consult OT/PT to assist with strengthening/mobility   - Keep Call bell within reach  - Keep bed low and locked with side rails adjusted as appropriate  - Keep care items and personal belongings within reach  - Initiate and maintain comfort rounds  - Make Fall Risk Sign visible to staff  - Apply yellow socks and bracelet for high fall risk patients  - Consider moving patient to room near nurses station  Outcome: Progressing  Goal: Maintain or return to baseline ADL function  Description: INTERVENTIONS:  -  Assess patient's ability to carry out ADLs; assess patient's baseline for ADL function and identify physical deficits which impact ability to perform ADLs (bathing, care of mouth/teeth, toileting, grooming, dressing, etc )  - Assess/evaluate cause of self-care deficits   - Assess range of motion  - Assess patient's mobility; develop plan if impaired  - Assess patient's need for assistive devices and provide as appropriate  - Encourage maximum independence but intervene and supervise when necessary  - Involve family in performance of ADLs  - Assess for home care needs following discharge   - Consider OT consult to assist with ADL evaluation and planning for discharge  - Provide patient education as appropriate  Outcome: Progressing  Goal: Maintains/Returns to pre admission functional level  Description: INTERVENTIONS:  - Perform BMAT or MOVE assessment daily    - Set and communicate daily mobility goal to care team and patient/family/caregiver  - Collaborate with rehabilitation services on mobility goals if consulted  - Out of bed for toileting  - Record patient progress and toleration of activity level   Outcome: Progressing     Problem: Knowledge Deficit  Goal: Patient/family/caregiver demonstrates understanding of disease process, treatment plan, medications, and discharge instructions  Description: Complete learning assessment and assess knowledge base    Interventions:  - Provide teaching at level of understanding  - Provide teaching via preferred learning methods  Outcome: Progressing     Problem: DISCHARGE PLANNING  Goal: Discharge to home or other facility with appropriate resources  Description: INTERVENTIONS:  - Identify barriers to discharge w/patient and caregiver  - Arrange for needed discharge resources and transportation as appropriate  - Identify discharge learning needs (meds, wound care, etc )  - Arrange for interpretive services to assist at discharge as needed  - Refer to Case Management Department for coordinating discharge planning if the patient needs post-hospital services based on physician/advanced practitioner order or complex needs related to functional status, cognitive ability, or social support system  Outcome: Progressing

## 2022-11-24 NOTE — DISCHARGE SUMMARY
Obstetrics Discharge Summary  Robbie Vela 24 y o  female MRN: [id number]  Unit/Bed#: L&D 305-01 Encounter: [id number]    Admission Date: 2022     Discharge Date: 2022    Admitting and Delivery Attending: Evens Galloway MD  Discharging Attending: Alana Steward MD    Admitting Diagnoses:   1  Pregnancy at 39 weeks 1 day gestation  2  Insufficient prenatal care  3  GBS unknown  4  History of asthma  5  Fetal arrhythmia   6  Maternal anemia  7  Vaginal candidiasis    Discharge Diagnoses:   Same, delivered    Procedures: spontaneous vaginal delivery    Anesthesia: epidural    Hospital course: Robbie Vela is now a 24 y o  [id number] who initially presented with complaint of leakage of fluid concerning for spontaneous rupture of membranes  She was found to have fetal arrhythmia in triage and fetal premature atrial contractions was confirmed on ultrasound  Induction of labor was recommended  Patient was induced with Pitocin and received an epidural   Please see progress notes for remainder of intrapartum course  On 2022 she delivered a viable female  39w2d via normal spontaneous vaginal delivery  She sustained a second degree laceration during delivery which was adequately repaired  's birth weight was 8lbs 15 4oz; Apgars were 8 (1 min) and 9 (5 min)   was admitted to the  nursery  Patient tolerated the procedure well  Her post-delivery course was uncomplicated  Her postpartum pain was well controlled with oral analgesics  Maternal blood type is O+ so RhoGAM was not indicated  On day of discharge, she was ambulating and able to reasonably perform all ADLs  She was voiding and had appropriate bowel function  Pain was well controlled  She was discharged home on postpartum day #1 without complications   Patient was instructed to follow up with her OBGYN as an outpatient and was given appropriate warnings to call provider if she develops signs of infection or uncontrolled pain  Complications: none apparent    Condition at discharge: good     Provisions for Follow-Up Care:  Please see after visit summary for information related to follow-up care and any pertinent home health orders  Disposition: Home    Planned Readmission: No    Discharge Medications:   Please see AVS for a complete list of discharge medications  Discharge instructions :   Please see AVS for complete discharge instructions  Doreen Kerr MD  PGY-III, OB/GYN  11/25/2022, 1:16 PM

## 2022-11-24 NOTE — ASSESSMENT & PLAN NOTE
Microcytic anemia with hemoglobin of 8g/dL noted on admission  Status post 1 dose of Venofer  Recommend oral iron supplementation on discharge

## 2022-11-25 NOTE — UTILIZATION REVIEW
NOTIFICATION OF INPATIENT ADMISSION   MATERNITY/DELIVERY AUTHORIZATION REQUEST   SERVICING FACILITY:   [address] [place] [address] - L&D, , NICU  [address] [place] [address], [place] [address] [address] [facility]  Tax ID: [id number]  NPI: [id number] ATTENDING PROVIDER:  Attending Name and NPI#: Alex Umana Md [[id number]]  Address: [address] [place] [address]  Phone: [phone]     ADMISSION INFORMATION:  Place of Service: Inpatient [address] [facility]  [address]  Place of Service Code: 21  Inpatient Admission Date/Time: 22  4:03 PM  Discharge Date/Time: 2022  1:26 PM  Admitting Diagnosis Code/Description:  Vaginal discharge during pregnancy [O26 899, N89 8]     Mother: John Mario 1997 Estimated Date of Delivery: 22  Delivering clinician:     OB History        5    Para   3    Term   3       0    AB   2    Living   3       SAB   2    IAB   0    Ectopic   0    Multiple   0    Live Births   3                Name & MRN:   Information for the patient's :  Diego Sevilla Girl Piter Alexander) [[id number]]      Delivery Information:  Sex: female  Delivered 2022 3:19 AM by Vaginal, Spontaneous; Gestational Age: 44w2d     Measurements:  Weight: 8 lb 15 4 oz (4065 g); Height: 20 5"    APGAR 1 minute 5 minutes 10 minutes   Totals: 8 9       Birth Information: 24 y o  female MRN: [id number] Unit/Bed#: L&D 305-01   Birthweight: No birth weight on file  Gestational Age: <None> Delivery Type:    APGARS Totals:        UTILIZATION REVIEW CONTACT:  Mireille Murphy Utilization   Network Utilization Review Department  Phone: [phone]  Fax [phone]  Email: [email or web address]  Contact for approvals/pending authorizations, clinical reviews, and discharge       PHYSICIAN ADVISORY SERVICES:  Medical Necessity Denial & [facility] Review  Phone: [phone]  Fax: [phone]  Email: [email or web address]

## 2023-01-17 ENCOUNTER — APPOINTMENT (EMERGENCY)
Dept: RADIOLOGY | Facility: HOSPITAL | Age: 28
End: 2023-01-17

## 2023-01-17 ENCOUNTER — HOSPITAL ENCOUNTER (EMERGENCY)
Facility: HOSPITAL | Age: 28
Discharge: HOME | End: 2023-01-17
Attending: EMERGENCY MEDICINE

## 2023-01-17 VITALS
DIASTOLIC BLOOD PRESSURE: 70 MMHG | RESPIRATION RATE: 19 BRPM | HEIGHT: 63 IN | TEMPERATURE: 97.5 F | SYSTOLIC BLOOD PRESSURE: 102 MMHG | OXYGEN SATURATION: 98 % | BODY MASS INDEX: 33.66 KG/M2 | HEART RATE: 67 BPM | WEIGHT: 190 LBS

## 2023-01-17 DIAGNOSIS — R00.2 PALPITATIONS: Primary | ICD-10-CM

## 2023-01-17 LAB
ALBUMIN SERPL BCP-MCNC: 3.7 G/DL (ref 3.5–5)
ALP SERPL-CCNC: 78 U/L (ref 46–116)
ALT SERPL W P-5'-P-CCNC: 41 U/L (ref 12–78)
ANION GAP SERPL CALCULATED.3IONS-SCNC: 10 MMOL/L (ref 4–13)
AST SERPL W P-5'-P-CCNC: 26 U/L (ref 5–45)
ATRIAL RATE: 78 BPM
BASOPHILS # BLD AUTO: 0.01 THOUSANDS/ÂΜL (ref 0–0.1)
BASOPHILS NFR BLD AUTO: 0 % (ref 0–1)
BILIRUB SERPL-MCNC: 0.21 MG/DL (ref 0.2–1)
BUN SERPL-MCNC: 10 MG/DL (ref 5–25)
CALCIUM SERPL-MCNC: 8.6 MG/DL (ref 8.3–10.1)
CARDIAC TROPONIN I PNL SERPL HS: <2 NG/L
CHLORIDE SERPL-SCNC: 104 MMOL/L (ref 96–108)
CO2 SERPL-SCNC: 25 MMOL/L (ref 21–32)
CREAT SERPL-MCNC: 0.84 MG/DL (ref 0.6–1.3)
EOSINOPHIL # BLD AUTO: 0.18 THOUSAND/ÂΜL (ref 0–0.61)
EOSINOPHIL NFR BLD AUTO: 3 % (ref 0–6)
ERYTHROCYTE [DISTWIDTH] IN BLOOD BY AUTOMATED COUNT: 19.5 % (ref 11.6–15.1)
GFR SERPL CREATININE-BSD FRML MDRD: 96 ML/MIN/1.73SQ M
GLUCOSE SERPL-MCNC: 85 MG/DL (ref 65–140)
HCT VFR BLD AUTO: 33.3 % (ref 34.8–46.1)
HGB BLD-MCNC: 10 G/DL (ref 11.5–15.4)
IMM GRANULOCYTES # BLD AUTO: 0.01 THOUSAND/UL (ref 0–0.2)
IMM GRANULOCYTES NFR BLD AUTO: 0 % (ref 0–2)
LYMPHOCYTES # BLD AUTO: 2.19 THOUSANDS/ÂΜL (ref 0.6–4.47)
LYMPHOCYTES NFR BLD AUTO: 37 % (ref 14–44)
MCH RBC QN AUTO: 22.9 PG (ref 26.8–34.3)
MCHC RBC AUTO-ENTMCNC: 30 G/DL (ref 31.4–37.4)
MCV RBC AUTO: 76 FL (ref 82–98)
MONOCYTES # BLD AUTO: 0.4 THOUSAND/ÂΜL (ref 0.17–1.22)
MONOCYTES NFR BLD AUTO: 7 % (ref 4–12)
NEUTROPHILS # BLD AUTO: 3.13 THOUSANDS/ÂΜL (ref 1.85–7.62)
NEUTS SEG NFR BLD AUTO: 53 % (ref 43–75)
NRBC BLD AUTO-RTO: 0 /100 WBCS
P AXIS: 69 DEGREES
PLATELET # BLD AUTO: 311 THOUSANDS/UL (ref 149–390)
PMV BLD AUTO: 10.3 FL (ref 8.9–12.7)
POTASSIUM SERPL-SCNC: 3.5 MMOL/L (ref 3.5–5.3)
PR INTERVAL: 144 MS
PROT SERPL-MCNC: 7.2 G/DL (ref 6.4–8.4)
QRS AXIS: 53 DEGREES
QRSD INTERVAL: 94 MS
QT INTERVAL: 404 MS
QTC INTERVAL: 460 MS
RBC # BLD AUTO: 4.36 MILLION/UL (ref 3.81–5.12)
SODIUM SERPL-SCNC: 139 MMOL/L (ref 135–147)
T WAVE AXIS: 47 DEGREES
TSH SERPL DL<=0.05 MIU/L-ACNC: 3.26 UIU/ML (ref 0.45–4.5)
VENTRICULAR RATE: 78 BPM
WBC # BLD AUTO: 5.92 THOUSAND/UL (ref 4.31–10.16)

## 2023-01-17 NOTE — DISCHARGE INSTRUCTIONS
You have been seen for palpitations and chest discomfort  Return to the emergency department if you develop worsening chest pain, trouble breathing, lightheadedness or any other symptoms of concern  Please follow up with a PCP by calling the number provided

## 2023-01-17 NOTE — ED PROVIDER NOTES
History  Chief Complaint   Patient presents with   • Chest Pain     Patient states she started with chest pain about an hour ago  Patient states it radiated into her left shoulder as well  Patient also has some SOB  Has hx of anxiety unsure if this may be an anxiety attack or not  Zeynep Menard is a 24y o  year old female with PMH of asthma, anxiety presenting to the [facility] ED for chest discomfort  One hour prior to arrival, the patient developed sudden onset of palpitations and chest discomfort  The patient felt "cold" in her extremities and short of breath  Palpitations resolving at time of evaluation in the emergency department  Patient reported to be in normal state of health earlier this evening  Denies recent cough/congestion  No nausea/vomiting/diarrhea or abdominal pain  Patient denies personal or family history of DVT/PE  Denies unilateral leg pain/swelling  Not currently on estrogen-containing contraceptive medication  No recent travel  No reported sick contacts  Patient has not taken/received any medications at home for relief of symptoms  Reports history of chronic anemia  History provided by:  Patient   used: No    Chest Pain  Associated symptoms: palpitations and shortness of breath    Associated symptoms: no abdominal pain, no cough, no headache, no nausea, no numbness, not vomiting and no weakness        Prior to Admission Medications   Prescriptions Last Dose Informant Patient Reported?  Taking?   acetaminophen (TYLENOL) 325 mg tablet   No No   Sig: Take 1 tablet (325 mg total) by mouth every 4 (four) hours as needed for mild pain   albuterol (ProAir HFA) 90 mcg/act inhaler   No No   Sig: Inhale 2 puffs every 6 (six) hours as needed for shortness of breath   docusate sodium (COLACE) 100 mg capsule   No No   Sig: Take 1 capsule (100 mg total) by mouth 2 (two) times a day   ibuprofen (MOTRIN) 600 mg tablet   No No   Sig: Take 1 tablet (600 mg total) by mouth every 6 (six) hours      Facility-Administered Medications: None       Past Medical History:   Diagnosis Date   • Anemia    • Anxiety    • Asthma    • Depression    • History of transfusion    • Miscarriage 09/14/2021   • Mononucleosis    • Positive urine drug screen 3/28/2019    Has social work consult   • Substance abuse [facility])     medical marijuana   • Urogenital trichomoniasis    • UTI (urinary tract infection)    • Varicella     immunized       History reviewed  No pertinent surgical history  Family History   Problem Relation Age of Onset   • Heart disease Mother    • Hypertension Mother    • Skin cancer Mother    • Mental illness Mother    • No Known Problems Daughter    • No Known Problems Son    • No Known Problems Maternal Grandmother    • No Known Problems Half-Brother    • No Known Problems Half-Brother    • Depression Half-Sister    • Anxiety disorder Half-Sister    • Breast cancer Neg Hx    • Colon cancer Neg Hx    • Ovarian cancer Neg Hx      I have reviewed and agree with the history as documented  E-Cigarette/Vaping   • E-Cigarette Use Current Some Day User    • Comments Medical marijuana only      E-Cigarette/Vaping Substances   • Nicotine Yes    • THC Yes    • CBD No    • Flavoring No    • Other No    • Unknown No      Social History     Tobacco Use   • Smoking status: Some Days     Types: Cigarettes   • Smokeless tobacco: Current   Vaping Use   • Vaping Use: Some days   • Substances: Nicotine, THC   Substance Use Topics   • Alcohol use: Not Currently   • Drug use: Not Currently     Types: Marijuana     Comment: medical marijuana  daily       Review of Systems   Constitutional: Positive for chills  HENT: Negative for congestion  Eyes: Negative for visual disturbance  Respiratory: Positive for shortness of breath  Negative for cough  Cardiovascular: Positive for chest pain and palpitations  Gastrointestinal: Negative for abdominal pain, diarrhea, nausea and vomiting  Genitourinary: Negative for dysuria  Musculoskeletal: Negative for arthralgias and myalgias  Neurological: Negative for weakness, light-headedness, numbness and headaches  Psychiatric/Behavioral: The patient is nervous/anxious  All other systems reviewed and are negative  Physical Exam  Physical Exam  Vitals and nursing note reviewed  Constitutional:       General: She is not in acute distress  Appearance: Normal appearance  She is well-developed  She is not ill-appearing, toxic-appearing or diaphoretic  HENT:      Head: Normocephalic and atraumatic  Nose: No congestion or rhinorrhea  Eyes:      General:         Right eye: No discharge  Left eye: No discharge  Cardiovascular:      Rate and Rhythm: Normal rate and regular rhythm  Pulmonary:      Effort: Pulmonary effort is normal  No accessory muscle usage or respiratory distress  Breath sounds: Normal breath sounds  No stridor  No decreased breath sounds, wheezing, rhonchi or rales  Abdominal:      General: Bowel sounds are normal  There is no distension  Palpations: Abdomen is soft  Tenderness: There is no abdominal tenderness  There is no right CVA tenderness, left CVA tenderness, guarding or rebound  Musculoskeletal:      Cervical back: Normal range of motion and neck supple  No rigidity  Right lower leg: No tenderness  No edema  Left lower leg: No tenderness  No edema  Skin:     Capillary Refill: Capillary refill takes less than 2 seconds  Findings: No rash  Neurological:      Mental Status: She is alert and oriented to person, place, and time     Psychiatric:         Mood and Affect: Mood normal          Behavior: Behavior normal          Vital Signs  ED Triage Vitals [01/17/23 0129]   Temperature Pulse Respirations Blood Pressure SpO2   97 5 °F (36 4 °C) 81 16 125/82 99 %      Temp Source Heart Rate Source Patient Position - Orthostatic VS BP Location FiO2 (%)   Temporal Monitor Lying Right arm --      Pain Score       4           Vitals:    01/17/23 0129 01/17/23 0300   BP: 125/82 102/70   Pulse: 81 67   Patient Position - Orthostatic VS: Lying          Visual Acuity      ED Medications  Medications - No data to display    Diagnostic Studies  Results Reviewed     Procedure Component Value Units Date/Time    TSH, 3rd generation with Free T4 reflex [[id number]]  (Normal) Collected: 01/17/23 0154    Lab Status: Final result Specimen: Blood from Arm, Right Updated: 01/17/23 0234     TSH 3RD GENERATON 3 257 uIU/mL     Narrative:      Patients undergoing fluorescein dye angiography may retain small amounts of fluorescein in the body for 48-72 hours post procedure  Samples containing fluorescein can produce falsely depressed TSH values  If the patient had this procedure,a specimen should be resubmitted post fluorescein clearance        HS Troponin 0hr (reflex protocol) [[id number]]  (Normal) Collected: 01/17/23 0154    Lab Status: Final result Specimen: Blood from Arm, Right Updated: 01/17/23 0233     hs TnI 0hr <2 ng/L     Comprehensive metabolic panel [[id number]] Collected: 01/17/23 0154    Lab Status: Final result Specimen: Blood from Arm, Right Updated: 01/17/23 0222     Sodium 139 mmol/L      Potassium 3 5 mmol/L      Chloride 104 mmol/L      CO2 25 mmol/L      ANION GAP 10 mmol/L      BUN 10 mg/dL      Creatinine 0 84 mg/dL      Glucose 85 mg/dL      Calcium 8 6 mg/dL      AST 26 U/L      ALT 41 U/L      Alkaline Phosphatase 78 U/L      Total Protein 7 2 g/dL      Albumin 3 7 g/dL      Total Bilirubin 0 21 mg/dL      eGFR 96 ml/min/1 73sq m     Narrative:      Josey guidelines for Chronic Kidney Disease (CKD):   •  Stage 1 with normal or high GFR (GFR > 90 mL/min/1 73 square meters)  •  Stage 2 Mild CKD (GFR = 60-89 mL/min/1 73 square meters)  •  Stage 3A Moderate CKD (GFR = 45-59 mL/min/1 73 square meters)  •  Stage 3B Moderate CKD (GFR = 30-44 mL/min/1 73 square meters)  •  Stage 4 Severe CKD (GFR = 15-29 mL/min/1 73 square meters)  •  Stage 5 End Stage CKD (GFR <15 mL/min/1 73 square meters)  Note: GFR calculation is accurate only with a steady state creatinine    CBC and differential [[id number]]  (Abnormal) Collected: 01/17/23 0154    Lab Status: Final result Specimen: Blood from Arm, Right Updated: 01/17/23 0203     WBC 5 92 Thousand/uL      RBC 4 36 Million/uL      Hemoglobin 10 0 g/dL      Hematocrit 33 3 %      MCV 76 fL      MCH 22 9 pg      MCHC 30 0 g/dL      RDW 19 5 %      MPV 10 3 fL      Platelets 761 Thousands/uL      nRBC 0 /100 WBCs      Neutrophils Relative 53 %      Immat GRANS % 0 %      Lymphocytes Relative 37 %      Monocytes Relative 7 %      Eosinophils Relative 3 %      Basophils Relative 0 %      Neutrophils Absolute 3 13 Thousands/µL      Immature Grans Absolute 0 01 Thousand/uL      Lymphocytes Absolute 2 19 Thousands/µL      Monocytes Absolute 0 40 Thousand/µL      Eosinophils Absolute 0 18 Thousand/µL      Basophils Absolute 0 01 Thousands/µL                  XR chest 1 view portable   ED Interpretation by Aldair Negro DO (01/17 0240)   No acute cardiopulmonary disease  Procedures  Procedures         ED Course  ED Course as of 01/17/23 0626   e Jan 17, 2023   0129 Procedure Note: EKG  Date/Time: 01/17/23 1:29 AM   Interpreted by: Aldair Negro DO  Indications / Diagnosis: palpitations  ECG reviewed by me, the ED Provider: yes   The EKG demonstrates:  Rhythm: normal sinus rhythm 78 BPM  Intervals: Normal GA and QT intervals  Axis: Normal axis  QRS/Blocks: Normal QRS  ST Changes: No acute ST/T waves changes  No PEDRO   No TWI    0233 hs TnI 0hr: <2             HEART Risk Score    Flowsheet Row Most Recent Value   Heart Score Risk Calculator    History 0 Filed at: 01/17/2023 0305   ECG 0 Filed at: 01/17/2023 0305   Age 0 Filed at: 01/17/2023 0305   Risk Factors 0 Filed at: 01/17/2023 0305   Troponin 0 Filed at: 01/17/2023 3774   HEART Score 0 Filed at: 01/17/2023 0305                PERC Rule for PE    Flowsheet Row Most Recent Value   PERC Rule for PE    Age >=50 0 Filed at: 01/17/2023 0202   HR >=100 0 Filed at: 01/17/2023 0202   O2 Sat on room air < 95% 0 Filed at: 01/17/2023 0202   History of PE or DVT 0 Filed at: 01/17/2023 0202   Recent trauma or surgery 0 Filed at: 01/17/2023 0202   Hemoptysis 0 Filed at: 01/17/2023 0202   Exogenous estrogen 0 Filed at: 01/17/2023 0202   Unilateral leg swelling 0 Filed at: 01/17/2023 0202   PERC Rule for PE Results 0 Filed at: 01/17/2023 0202                  Wells' Criteria for PE    Flowsheet Row Most Recent Value   Wells' Criteria for PE    Clinical signs and symptoms of DVT 0 Filed at: 01/17/2023 0201   PE is primary diagnosis or equally likely 0 Filed at: 01/17/2023 0201   HR >100 0 Filed at: 01/17/2023 0201   Immobilization at least 3 days or Surgery in the previous 4 weeks 0 Filed at: 01/17/2023 0201   Previous, objectively diagnosed PE or DVT 0 Filed at: 01/17/2023 0201   Hemoptysis 0 Filed at: 01/17/2023 0201   Malignancy with treatment within 6 months or palliative 0 Filed at: 01/17/2023 0201   Wells' Criteria Total 0 Filed at: 01/17/2023 0201                Medical Decision Making    24 y o  female presenting for palpitations, chest discomfort and dyspnea  Will order CBC, CMP, troponin, EKG, CXR to evaluate for ACS, PTX, pulmonary disease, widened mediastinum  Wells/PERC negative, do not suspect PE  After diagnostic lab testing and imaging, no definitive abnormality was noted that would explain the patient's symptoms  I explained to the patient the test results  I explained to the patient that although no definitive diagnosis could be made today, the possibility exists that it may be too early in the disease process to diagnose serious illness  Discussed DDx including PVC vs  Paroxysmal arrhythmia vs  Anxiety      Disposition: I have discussed with the patient our plan to discharge them from the ED and the patient is in agreement with this plan  Discharge Plan: Outpatient f/u  RTED precautions emphasized  The patient was provided a written after visit summary with strict RTED precautions  Followup: I have discussed with the patient plan to follow up with a PCP  Contact information provided in AVS     Palpitations: acute illness or injury  Amount and/or Complexity of Data Reviewed  Labs: ordered  Decision-making details documented in ED Course  Radiology: independent interpretation performed  Disposition  Final diagnoses:   Palpitations     Time reflects when diagnosis was documented in both MDM as applicable and the Disposition within this note     Time User Action Codes Description Comment    1/17/2023  2:46 AM Sonu Eden Add [R00 2] Palpitations       ED Disposition     ED Disposition   Discharge    Condition   Stable    Date/Time   Tue Jan 17, 2023  2:45 AM    Comment   [address] discharge to home/self care  Follow-up Information     Follow up With Specialties Details Why Contact Info Additional [address] Medicine Schedule an appointment as soon as possible for a visit  To establish care   [address] [place] [address],[address] [id number]  [facility] [address], [id number] [place] [address]          Discharge Medication List as of 1/17/2023  2:46 AM      CONTINUE these medications which have NOT CHANGED    Details   acetaminophen (TYLENOL) 325 mg tablet Take 1 tablet (325 mg total) by mouth every 4 (four) hours as needed for mild pain, Starting Thu 11/24/2022, No Print      albuterol (ProAir HFA) 90 mcg/act inhaler Inhale 2 puffs every 6 (six) hours as needed for shortness of breath, Starting Wed 10/12/2022, Normal      docusate sodium (COLACE) 100 mg capsule Take 1 capsule (100 mg total) by mouth 2 (two) times a day, Starting u 11/24/2022, No Print      ibuprofen (MOTRIN) 600 mg tablet Take 1 tablet (600 mg total) by mouth every 6 (six) hours, Starting u 11/24/2022, No Print             No discharge procedures on file      PDMP Review     None          ED Provider  Electronically Signed by           Maggie Ahumada, DO  01/17/23 0469

## 2023-05-01 ENCOUNTER — OFFICE VISIT (OUTPATIENT)
Dept: URGENT CARE | Facility: MEDICAL CENTER | Age: 28
End: 2023-05-01

## 2023-05-01 VITALS
OXYGEN SATURATION: 98 % | SYSTOLIC BLOOD PRESSURE: 125 MMHG | DIASTOLIC BLOOD PRESSURE: 86 MMHG | TEMPERATURE: 98.5 F | WEIGHT: 166 LBS | BODY MASS INDEX: 29.41 KG/M2 | RESPIRATION RATE: 20 BRPM | HEART RATE: 96 BPM

## 2023-05-01 DIAGNOSIS — R09.81 SINUS CONGESTION: ICD-10-CM

## 2023-05-01 DIAGNOSIS — H69.93 DISORDER OF BOTH EUSTACHIAN TUBES: Primary | ICD-10-CM

## 2023-05-01 RX ORDER — FEXOFENADINE HCL 180 MG/1
180 TABLET ORAL DAILY
Qty: 30 TABLET | Refills: 0 | Status: SHIPPED | OUTPATIENT
Start: 2023-05-01

## 2023-05-01 RX ORDER — FLUTICASONE PROPIONATE 50 MCG
1 SPRAY, SUSPENSION (ML) NASAL DAILY
Qty: 11.1 ML | Refills: 0 | Status: SHIPPED | OUTPATIENT
Start: 2023-05-01

## 2023-05-01 RX ORDER — METHYLPREDNISOLONE 4 MG/1
TABLET ORAL
Qty: 1 EACH | Refills: 0 | Status: SHIPPED | OUTPATIENT
Start: 2023-05-01

## 2023-05-01 NOTE — PATIENT INSTRUCTIONS
You may take over the counter Tylenol (Acetaminophen) as needed, as directed on packaging  Be sure to get plenty of rest, and drinking fluids to remain hydrated  Over the counter decongestants can be used, only as directed on the box  However if you have any history of cardiac disease or high blood pressure these should be avoided, as we caution the use of these since they can make place strain on your heart and cause increase in blood pressure  It is recommended in lieu of decongestants to use cool mist vaporizer, saline nasal spray to relieve nasal congestion  It is also important to remain hydrated and drink plenty of fluids (avoiding caffeine and alcohol)

## 2023-05-01 NOTE — PROGRESS NOTES
[facility]        NAME: Janith Apgar is a 24 y o  female  : 1995    MRN: [id number]  DATE: May 1, 2023  TIME: 6:50 PM    Assessment and Plan   Disorder of both eustachian tubes [H69 93]  1  Disorder of both eustachian tubes  fluticasone (FLONASE) 50 mcg/act nasal spray    methylPREDNISolone 4 MG tablet therapy pack    fexofenadine (ALLEGRA) 180 MG tablet      2  Sinus congestion  fexofenadine (ALLEGRA) 180 MG tablet            Patient Instructions       Follow up with PCP in 3-5 days  Proceed to  ER if symptoms worsen  Chief Complaint     Chief Complaint   Patient presents with    Cold Like Symptoms     Congestion, cough and watery eyes, chest burning  Started Friday  Using Ibu         History of Present Illness       Patient here with stuffy nose, watery eyes, sore throat, chest congestion and burning in her chest with coughing  Has not had any fevers  Eating and drinking normal  At home taking ibuprofen  Review of Systems   Review of Systems   Constitutional: Negative for chills and fever  HENT: Positive for congestion, postnasal drip, rhinorrhea, sinus pressure, sinus pain and sore throat  Negative for ear pain and trouble swallowing  Eyes: Negative for pain and visual disturbance  Respiratory: Positive for cough  Negative for shortness of breath  Non-productive cough     Cardiovascular: Negative for chest pain and palpitations  Gastrointestinal: Negative for abdominal pain, constipation, diarrhea, nausea and vomiting  Genitourinary: Negative for dysuria and hematuria  Musculoskeletal: Negative for arthralgias and back pain  Skin: Negative for color change and rash  Neurological: Positive for headaches  Negative for dizziness, seizures, syncope and light-headedness  All other systems reviewed and are negative          Current Medications       Current Outpatient Medications:     fexofenadine (ALLEGRA) 180 MG tablet, Take 1 tablet (180 mg total) by mouth daily, Disp: 30 tablet, Rfl: 0    fluticasone (FLONASE) 50 mcg/act nasal spray, 1 spray into each nostril daily, Disp: 11 1 mL, Rfl: 0    methylPREDNISolone 4 MG tablet therapy pack, Use as directed on package, Disp: 1 each, Rfl: 0    ibuprofen (MOTRIN) 600 mg tablet, Take 1 tablet (600 mg total) by mouth every 6 (six) hours, Disp: 30 tablet, Rfl: 0    Current Allergies     Allergies as of 05/01/2023 - Reviewed 05/01/2023   Allergen Reaction Noted    Pollen extract Sneezing 07/28/2016            The following portions of the patient's history were reviewed and updated as appropriate: allergies, current medications, past family history, past medical history, past social history, past surgical history and problem list      Past Medical History:   Diagnosis Date    Anemia     Anxiety     Asthma     Depression     History of transfusion     Miscarriage 09/14/2021    Mononucleosis     Positive urine drug screen 3/28/2019    Has social work consult    Substance abuse ([facility] [address] )     medical marijuana    Urogenital trichomoniasis     UTI (urinary tract infection)     Varicella     immunized       History reviewed  No pertinent surgical history  Family History   Problem Relation Age of Onset    Heart disease Mother     Hypertension Mother     Skin cancer Mother     Mental illness Mother     No Known Problems Daughter     No Known Problems Son     No Known Problems Maternal Grandmother     No Known Problems Half-Brother     No Known Problems Half-Brother     Depression Half-Sister     Anxiety disorder Half-Sister     Breast cancer Neg Hx     Colon cancer Neg Hx     Ovarian cancer Neg Hx          Medications have been verified  Objective   /86   Pulse 96   Temp 98 5 °F (36 9 °C)   Resp 20   Wt 75 3 kg (166 lb)   LMP 04/10/2023 (Approximate)   SpO2 98%   Breastfeeding No   BMI 29 41 kg/m²        Physical Exam     Physical Exam  Vitals and nursing note reviewed  Constitutional:       General: She is not in acute distress  Appearance: Normal appearance  She is normal weight  She is not ill-appearing  HENT:      Head: Normocephalic and atraumatic  Right Ear: Ear canal and external ear normal  A middle ear effusion is present  Left Ear: Ear canal and external ear normal  A middle ear effusion is present  Nose: Congestion and rhinorrhea present  Rhinorrhea is clear  Right Sinus: Maxillary sinus tenderness and frontal sinus tenderness present  Left Sinus: Maxillary sinus tenderness and frontal sinus tenderness present  Mouth/Throat:      Lips: Pink  Mouth: Mucous membranes are moist       Pharynx: Oropharynx is clear  Uvula midline  No pharyngeal swelling, oropharyngeal exudate, posterior oropharyngeal erythema or uvula swelling  Tonsils: No tonsillar exudate or tonsillar abscesses  0 on the right  0 on the left  Eyes:      Extraocular Movements: Extraocular movements intact  Conjunctiva/sclera: Conjunctivae normal       Pupils: Pupils are equal, round, and reactive to light  Cardiovascular:      Rate and Rhythm: Normal rate and regular rhythm  Pulses: Normal pulses  Heart sounds: Normal heart sounds  Pulmonary:      Effort: Pulmonary effort is normal       Breath sounds: Normal breath sounds  Abdominal:      General: Abdomen is flat  Bowel sounds are normal       Palpations: Abdomen is soft  Musculoskeletal:         General: Normal range of motion  Cervical back: Normal range of motion and neck supple  Lymphadenopathy:      Cervical: No cervical adenopathy  Skin:     General: Skin is warm  Capillary Refill: Capillary refill takes less than 2 seconds  Neurological:      General: No focal deficit present  Mental Status: She is alert and oriented to person, place, and time     Psychiatric:         Mood and Affect: Mood normal          Behavior: Behavior normal

## 2023-06-13 ENCOUNTER — OFFICE VISIT (OUTPATIENT)
Dept: OBGYN CLINIC | Facility: CLINIC | Age: 28
End: 2023-06-13
Payer: COMMERCIAL

## 2023-06-13 ENCOUNTER — TELEPHONE (OUTPATIENT)
Dept: OBGYN CLINIC | Facility: MEDICAL CENTER | Age: 28
End: 2023-06-13

## 2023-06-13 VITALS
SYSTOLIC BLOOD PRESSURE: 124 MMHG | HEIGHT: 63 IN | DIASTOLIC BLOOD PRESSURE: 70 MMHG | WEIGHT: 173 LBS | BODY MASS INDEX: 30.65 KG/M2

## 2023-06-13 DIAGNOSIS — N81.89 PELVIC FLOOR WEAKNESS IN FEMALE: ICD-10-CM

## 2023-06-13 DIAGNOSIS — M54.2 CERVICAL PAIN: Primary | ICD-10-CM

## 2023-06-13 PROBLEM — Z12.4 CERVICAL CANCER SCREENING: Status: RESOLVED | Noted: 2023-06-13 | Resolved: 2023-06-13

## 2023-06-13 PROBLEM — O36.8390 FETAL ARRHYTHMIA AFFECTING PREGNANCY, ANTEPARTUM: Status: RESOLVED | Noted: 2022-11-22 | Resolved: 2023-06-13

## 2023-06-13 PROBLEM — Z12.4 CERVICAL CANCER SCREENING: Status: ACTIVE | Noted: 2023-06-13

## 2023-06-13 PROCEDURE — 99213 OFFICE O/P EST LOW 20 MIN: CPT | Performed by: NURSE PRACTITIONER

## 2023-06-13 NOTE — TELEPHONE ENCOUNTER
Left message with patient on her voicemail to set up [facility]

## 2023-06-13 NOTE — PROGRESS NOTES
"Assessment/Plan:      Diagnoses and all orders for this visit:    Cervical pain    Pelvic floor weakness in female  -     Ambulatory Referral to Physical Therapy; Future      - reviewed nabothian cyst patient reassured nabothian cysts are common and no cause for concern  -Reviewed generalized pelvic floor weakness and recommendation for pelvic floor physical therapy  Referral placed  -Reviewed with patient recommendation for cervical cancer screening  Encouraged to schedule appointment prior to leaving office today  -Signs and symptoms to report reviewed    RTO annual exam    Subjective:     Patient ID: Yany Mauricio is a 24 y o  female  HPI P3 here with complaints of lump on cervix for about 1 week  LMP 6/6/23  Patient states she was showering and felt inside her vagina and felt a lump on her cervix around pea-sized  Lump is nonpainful  Denies vaginal discharge or odor, bowel or bladder concerns  Admits to pelvic floor weakness and painful intercourse  Denies alleviating or aggravating factors  Last Pap smear uncertain  Gardasil vaccine uncertain    Review of Systems   Constitutional: Negative for chills, fatigue and fever  Respiratory: Negative  Cardiovascular: Negative  Genitourinary: Positive for dyspareunia  Negative for decreased urine volume, difficulty urinating, dysuria, enuresis, flank pain, frequency, genital sores, hematuria, menstrual problem, pelvic pain, urgency, vaginal bleeding, vaginal discharge and vaginal pain  Lump on cervix   Neurological: Negative for headaches  Objective:  /70   Ht 5' 3\" (1 6 m)   Wt 78 5 kg (173 lb)   LMP 06/06/2023 (Exact Date)   BMI 30 65 kg/m²      Physical Exam  Vitals reviewed  Exam conducted with a chaperone present  Constitutional:       Appearance: Normal appearance  Genitourinary:     General: Normal vulva  Labia:         Right: No rash, tenderness, lesion or injury           Left: No rash, tenderness, " lesion or injury  Vagina: Normal       Cervix: Normal             Comments: Generalized pelvic floor weakness     Neurological:      Mental Status: She is alert and oriented to person, place, and time     Psychiatric:         Mood and Affect: Mood normal          Behavior: Behavior normal

## 2023-06-26 ENCOUNTER — VBI (OUTPATIENT)
Dept: ADMINISTRATIVE | Facility: OTHER | Age: 28
End: 2023-06-26

## 2023-08-07 ENCOUNTER — TELEPHONE (OUTPATIENT)
Dept: OBGYN CLINIC | Facility: MEDICAL CENTER | Age: 28
End: 2023-08-07

## 2023-08-07 NOTE — TELEPHONE ENCOUNTER
Pt just got a positive pregnancy test, PT would like a call to discuss options. We do not have any sooner apts and pt can not go to any other office outside of [facility]. Please review, thank you.

## 2023-08-08 ENCOUNTER — TELEPHONE (OUTPATIENT)
Dept: OBGYN CLINIC | Facility: MEDICAL CENTER | Age: 28
End: 2023-08-08

## 2023-08-08 NOTE — TELEPHONE ENCOUNTER
Spoke with patient regarding concerns. Patient would like to move forward with termination. Patient provided with resources. Patient made aware that she can schedule an appointment to come see the provider at anytime to follow-up. She has a yearly scheduled end of the month.

## 2023-11-15 ENCOUNTER — OFFICE VISIT (OUTPATIENT)
Dept: URGENT CARE | Facility: MEDICAL CENTER | Age: 28
End: 2023-11-15
Payer: COMMERCIAL

## 2023-11-15 VITALS
BODY MASS INDEX: 29.23 KG/M2 | TEMPERATURE: 98.3 F | DIASTOLIC BLOOD PRESSURE: 76 MMHG | OXYGEN SATURATION: 98 % | RESPIRATION RATE: 18 BRPM | HEART RATE: 72 BPM | WEIGHT: 165 LBS | SYSTOLIC BLOOD PRESSURE: 126 MMHG

## 2023-11-15 DIAGNOSIS — R09.81 SINUS CONGESTION: ICD-10-CM

## 2023-11-15 DIAGNOSIS — L50.9 URTICARIA: ICD-10-CM

## 2023-11-15 DIAGNOSIS — R05.1 ACUTE COUGH: Primary | ICD-10-CM

## 2023-11-15 PROCEDURE — S9088 SERVICES PROVIDED IN URGENT: HCPCS

## 2023-11-15 PROCEDURE — 99212 OFFICE O/P EST SF 10 MIN: CPT

## 2023-11-15 RX ORDER — FLUTICASONE PROPIONATE 50 MCG
1 SPRAY, SUSPENSION (ML) NASAL DAILY
Qty: 11.1 ML | Refills: 0 | Status: SHIPPED | OUTPATIENT
Start: 2023-11-15

## 2023-11-15 RX ORDER — METHYLPREDNISOLONE 4 MG/1
TABLET ORAL
Qty: 1 EACH | Refills: 0 | Status: SHIPPED | OUTPATIENT
Start: 2023-11-15

## 2023-11-15 RX ORDER — HYDROXYZINE HYDROCHLORIDE 25 MG/1
25 TABLET, FILM COATED ORAL EVERY 6 HOURS PRN
Qty: 30 TABLET | Refills: 0 | Status: SHIPPED | OUTPATIENT
Start: 2023-11-15

## 2023-11-15 NOTE — PATIENT INSTRUCTIONS
Please follow up with your primary provider in the next several days. Should you have any worsening of symptoms, or lack of improvement please be re-evaluated. If needed for significant concerns, consider 911 or ER evaluation. Over the counter decongestants can be used, only as directed on the box. However if you have any history of cardiac disease or high blood pressure these should be avoided, as we caution the use of these since they can make place strain on your heart and cause increase in blood pressure. It is recommended in lieu of decongestants to use cool mist vaporizer, saline nasal spray to relieve nasal congestion. It is also important to remain hydrated and drink plenty of fluids (avoiding caffeine and alcohol). The unnecessary use of antibiotics can have harmful affect, unwanted side-effects and can lead to antibiotic resistant bacteria in the future. You are being treated today for a viral illness. Viral illnesses do not require antibiotics, and are treated symptomatically. According to the Centers for Disease Control and Prevention, about one-third of antibiotic use in the outpatient setting, is not needed nor appropriate. Antibiotics treat infections caused by bacteria. But they don't treat infections caused by viruses (viral infections). For example, an antibiotic is the correct treatment for strep throat, which is caused by bacteria. But it's not the right treatment for most sore throats, which are caused by viruses. By being proactive and treating your individual symptoms, this may help you feel better.

## 2023-11-15 NOTE — LETTER
November 15, 2023     Patient: Jose Cotto   YOB: 1995   Date of Visit: 11/15/2023       To Whom it May Concern:    Juan Lewis was seen in my clinic on 11/15/2023. She may return to work on 11/16/2023 . If you have any questions or concerns, please don't hesitate to call.          Sincerely,          CHANDNI Huitron        CC: No Recipients

## 2023-11-15 NOTE — PROGRESS NOTES
[facility]        NAME: Marissa Mac is a 34 y.o. female  : 1995    MRN: [id number]  DATE: November 15, 2023  TIME: 10:13 AM    Assessment and Plan   Acute cough [R05.1]  1. Acute cough  methylPREDNISolone 4 MG tablet therapy pack      2. Sinus congestion  methylPREDNISolone 4 MG tablet therapy pack    fluticasone (FLONASE) 50 mcg/act nasal spray      3. Urticaria  methylPREDNISolone 4 MG tablet therapy pack    hydrOXYzine HCL (ATARAX) 25 mg tablet            Patient Instructions       Follow up with PCP in 3-5 days. Proceed to  ER if symptoms worsen. Chief Complaint     Chief Complaint   Patient presents with   • Shortness of Breath   • Cough     Productive cough X 1 week    • Urticaria     B/L legs itching and burning         History of Present Illness       Patient here with cough, shortness of breath x9 days. Cough productive with green/yellow mucus. Also reports itching/burning of b/l legs which she states started Saturday. She did take benadryl for this. Last night had another episode of itching/hives and took benadryl again. She has not had any fevers. She was also taking ibuprofen for her headaches. Review of Systems   Review of Systems   Constitutional:  Negative for appetite change, chills, fatigue and fever. HENT:  Positive for congestion, rhinorrhea, sinus pressure and sinus pain. Negative for ear pain, sore throat and trouble swallowing. Respiratory:  Positive for cough and shortness of breath. Cardiovascular:  Negative for chest pain and palpitations. Gastrointestinal:  Negative for abdominal pain, constipation, diarrhea, nausea and vomiting. Stomach discomfort x over a month. Musculoskeletal:  Negative for arthralgias and back pain. Skin:  Negative for color change and rash. Neurological:  Negative for dizziness, light-headedness and headaches. All other systems reviewed and are negative.         Current Medications       Current Outpatient Medications:   •  fluticasone (FLONASE) 50 mcg/act nasal spray, 1 spray into each nostril daily, Disp: 11.1 mL, Rfl: 0  •  hydrOXYzine HCL (ATARAX) 25 mg tablet, Take 1 tablet (25 mg total) by mouth every 6 (six) hours as needed for itching, Disp: 30 tablet, Rfl: 0  •  methylPREDNISolone 4 MG tablet therapy pack, Use as directed on package, Disp: 1 each, Rfl: 0    Current Allergies     Allergies as of 11/15/2023 - Reviewed 11/15/2023   Allergen Reaction Noted   • Pollen extract Sneezing 07/28/2016            The following portions of the patient's history were reviewed and updated as appropriate: allergies, current medications, past family history, past medical history, past social history, past surgical history and problem list.     Past Medical History:   Diagnosis Date   • Anemia    • Anxiety    • Asthma    • Depression    • History of transfusion    • Miscarriage 09/14/2021   • Mononucleosis    • Positive urine drug screen 3/28/2019    Has social work consult   • Substance abuse ([facility])     medical marijuana   • Urogenital trichomoniasis    • UTI (urinary tract infection)    • Varicella     immunized       History reviewed. No pertinent surgical history. Family History   Problem Relation Age of Onset   • Heart disease Mother    • Hypertension Mother    • Skin cancer Mother    • Mental illness Mother    • No Known Problems Daughter    • No Known Problems Son    • No Known Problems Maternal Grandmother    • No Known Problems Half-Brother    • No Known Problems Half-Brother    • Depression Half-Sister    • Anxiety disorder Half-Sister    • Breast cancer Neg Hx    • Colon cancer Neg Hx    • Ovarian cancer Neg Hx          Medications have been verified. Objective   /76   Pulse 72   Temp 98.3 °F (36.8 °C)   Resp 18   Wt 74.8 kg (165 lb)   SpO2 98%   BMI 29.23 kg/m²        Physical Exam     Physical Exam  Vitals and nursing note reviewed.    Constitutional:       General: She is not in acute distress. Appearance: Normal appearance. She is normal weight. She is not ill-appearing. HENT:      Head: Normocephalic and atraumatic. Right Ear: Ear canal and external ear normal. A middle ear effusion is present. Left Ear: Tympanic membrane, ear canal and external ear normal.      Nose: Congestion and rhinorrhea present. Rhinorrhea is clear. Right Sinus: Frontal sinus tenderness present. No maxillary sinus tenderness. Left Sinus: Frontal sinus tenderness present. No maxillary sinus tenderness. Mouth/Throat:      Lips: Pink. Mouth: Mucous membranes are moist.      Pharynx: Oropharynx is clear. Uvula midline. No pharyngeal swelling, oropharyngeal exudate, posterior oropharyngeal erythema or uvula swelling. Tonsils: No tonsillar exudate or tonsillar abscesses. 0 on the right. 0 on the left. Eyes:      Extraocular Movements: Extraocular movements intact. Conjunctiva/sclera: Conjunctivae normal.      Pupils: Pupils are equal, round, and reactive to light. Cardiovascular:      Rate and Rhythm: Normal rate and regular rhythm. Pulses: Normal pulses. Heart sounds: Normal heart sounds, S1 normal and S2 normal. No murmur heard. Pulmonary:      Effort: Pulmonary effort is normal.      Breath sounds: Wheezing present. Comments: Faint wheezing hear throughout. Abdominal:      General: Abdomen is flat. Bowel sounds are normal.      Palpations: Abdomen is soft. Musculoskeletal:         General: Normal range of motion. Cervical back: Full passive range of motion without pain, normal range of motion and neck supple. Lymphadenopathy:      Cervical: Cervical adenopathy present. Skin:     General: Skin is warm and dry. Capillary Refill: Capillary refill takes less than 2 seconds. Findings: Rash present. Rash is urticarial.          Neurological:      General: No focal deficit present.       Mental Status: She is alert and oriented to person, place, and time.    Psychiatric:         Mood and Affect: Mood normal.         Behavior: Behavior normal.

## 2024-02-12 ENCOUNTER — OFFICE VISIT (OUTPATIENT)
Dept: URGENT CARE | Facility: MEDICAL CENTER | Age: 29
End: 2024-02-12
Payer: COMMERCIAL

## 2024-02-12 VITALS
TEMPERATURE: 98.4 F | WEIGHT: 181.6 LBS | HEART RATE: 102 BPM | OXYGEN SATURATION: 97 % | DIASTOLIC BLOOD PRESSURE: 80 MMHG | RESPIRATION RATE: 20 BRPM | SYSTOLIC BLOOD PRESSURE: 118 MMHG | BODY MASS INDEX: 32.17 KG/M2

## 2024-02-12 DIAGNOSIS — R21 RASH: ICD-10-CM

## 2024-02-12 DIAGNOSIS — R42 VERTIGO: Primary | ICD-10-CM

## 2024-02-12 DIAGNOSIS — Z20.822 ENCOUNTER FOR LABORATORY TESTING FOR COVID-19 VIRUS: ICD-10-CM

## 2024-02-12 PROCEDURE — 87635 SARS-COV-2 COVID-19 AMP PRB: CPT | Performed by: PHYSICIAN ASSISTANT

## 2024-02-12 PROCEDURE — S9088 SERVICES PROVIDED IN URGENT: HCPCS | Performed by: PHYSICIAN ASSISTANT

## 2024-02-12 PROCEDURE — 99213 OFFICE O/P EST LOW 20 MIN: CPT | Performed by: PHYSICIAN ASSISTANT

## 2024-02-12 RX ORDER — HYDROXYZINE HYDROCHLORIDE 25 MG/1
25 TABLET, FILM COATED ORAL EVERY 6 HOURS PRN
Qty: 90 TABLET | Refills: 0 | Status: SHIPPED | OUTPATIENT
Start: 2024-02-12

## 2024-02-12 NOTE — PROGRESS NOTES
[facility]        NAME: Skyla Loving is a 28 y.o. female  : 1995    MRN: [id number]  DATE: 2024  TIME: 11:36 AM    Assessment and Plan   Vertigo [R42]  1. Vertigo        2. Encounter for laboratory testing for COVID-19 virus  COVID Only- Office Collect      3. Rash  Ambulatory Referral to Allergy    hydrOXYzine HCL (ATARAX) 25 mg tablet            Patient Instructions     Medications as prescribed  Vitamin D3 2000 IU daily  Vitamin C 1000mg twice per day  Multivitamin daily  Fluids and rest  Follow up with PCP in 3-5 days.  Proceed to  ER if symptoms worsen.    Chief Complaint     Chief Complaint   Patient presents with    Dizziness     Pt here for feeling dizzy, daughter has fever and tested positive Covid.         History of Present Illness       States dizziness was her only symptom with her prior COVID infection. Additionally would like an allergist referral and refill for Atarax. Reports intermittent itching rash to b/l extremities. Rash is not present today.     Dizziness  This is a new problem. The current episode started today. The problem has been gradually improving. Pertinent negatives include no abdominal pain, chills, congestion, coughing, fever, headaches, myalgias, nausea, rash, sore throat or vomiting. Exacerbated by: movement. She has tried nothing for the symptoms.       Review of Systems   Review of Systems   Constitutional:  Negative for chills and fever.   HENT:  Positive for ear pain (pressure). Negative for congestion, ear discharge, hearing loss, postnasal drip, rhinorrhea, sinus pressure, sinus pain, sore throat, tinnitus and trouble swallowing.    Respiratory:  Negative for cough and shortness of breath.    Gastrointestinal:  Negative for abdominal pain, constipation, diarrhea, nausea and vomiting.   Musculoskeletal:  Negative for myalgias.   Skin:  Negative for rash.   Neurological:  Positive for dizziness. Negative for headaches.         Current  Medications       Current Outpatient Medications:     hydrOXYzine HCL (ATARAX) 25 mg tablet, Take 1 tablet (25 mg total) by mouth every 6 (six) hours as needed for itching, Disp: 90 tablet, Rfl: 0    fluticasone (FLONASE) 50 mcg/act nasal spray, 1 spray into each nostril daily (Patient not taking: Reported on 2/12/2024), Disp: 11.1 mL, Rfl: 0    hydrOXYzine HCL (ATARAX) 25 mg tablet, Take 1 tablet (25 mg total) by mouth every 6 (six) hours as needed for itching (Patient not taking: Reported on 2/12/2024), Disp: 30 tablet, Rfl: 0    methylPREDNISolone 4 MG tablet therapy pack, Use as directed on package (Patient not taking: Reported on 2/12/2024), Disp: 1 each, Rfl: 0    Current Allergies     Allergies as of 02/12/2024 - Reviewed 02/12/2024   Allergen Reaction Noted    Pollen extract Sneezing 07/28/2016            The following portions of the patient's history were reviewed and updated as appropriate: allergies, current medications, past family history, past medical history, past social history, past surgical history and problem list.     Past Medical History:   Diagnosis Date    Anemia     Anxiety     Asthma     Depression     History of transfusion     Miscarriage 09/14/2021    Mononucleosis     Positive urine drug screen 3/28/2019    Has social work consult    Substance abuse ([facility])     medical marijuana    Urogenital trichomoniasis     UTI (urinary tract infection)     Varicella     immunized       History reviewed. No pertinent surgical history.    Family History   Problem Relation Age of Onset    Heart disease Mother     Hypertension Mother     Skin cancer Mother     Mental illness Mother     No Known Problems Daughter     No Known Problems Son     No Known Problems Maternal Grandmother     No Known Problems Half-Brother     No Known Problems Half-Brother     Depression Half-Sister     Anxiety disorder Half-Sister     Breast cancer Neg Hx     Colon cancer Neg Hx     Ovarian cancer Neg Hx          Medications  have been verified.        Objective   /80   Pulse 102   Temp 98.4 °F (36.9 °C) (Temporal)   Resp 20   Wt 82.4 kg (181 lb 9.6 oz)   LMP 01/12/2024 (Approximate)   SpO2 97%   Breastfeeding No   BMI 32.17 kg/m²   Patient's last menstrual period was 01/12/2024 (approximate).       Physical Exam     Physical Exam  Vitals reviewed.   Constitutional:       General: She is not in acute distress.     Appearance: She is well-developed. She is not diaphoretic.   HENT:      Head: Normocephalic and atraumatic.      Right Ear: Ear canal and external ear normal. A middle ear effusion is present.      Left Ear: Ear canal and external ear normal. A middle ear effusion is present.      Nose: Nose normal.      Mouth/Throat:      Pharynx: No oropharyngeal exudate or posterior oropharyngeal erythema.   Cardiovascular:      Rate and Rhythm: Normal rate and regular rhythm.      Heart sounds: Normal heart sounds. No murmur heard.     No friction rub. No gallop.   Pulmonary:      Effort: Pulmonary effort is normal. No respiratory distress.      Breath sounds: Normal breath sounds. No wheezing, rhonchi or rales.   Lymphadenopathy:      Cervical: No cervical adenopathy.   Skin:     General: Skin is warm.      Findings: No rash.   Neurological:      Mental Status: She is alert.   Psychiatric:         Behavior: Behavior normal.         Thought Content: Thought content normal.         Judgment: Judgment normal.

## 2024-02-12 NOTE — PATIENT INSTRUCTIONS
Medications as prescribed  Vitamin D3 2000 IU daily  Vitamin C 1000mg twice per day  Multivitamin daily  Fluids and rest  Follow up with PCP in 3-5 days.  Proceed to  ER if symptoms worsen.    COVID-19 Home Care Guidelines    Your healthcare provider and/or public health staff have evaluated you and have determined that you do not need to remain in the hospital at this time.  At this time you can be isolated at home where you will be monitored by staff from your local or state health department. You should carefully follow the prevention and isolation steps below until a healthcare provider or local or state health department says that you can return to your normal activities.      Stay home except to get medical care    People who are mildly ill with COVID-19 are able to isolate at home during their illness. You should restrict activities outside your home, except for getting medical care. Do not go to work, school, or public areas. Avoid using public transportation, ride-sharing, or taxis.    Separate yourself from other people and animals in your home    People: As much as possible, you should stay in a specific room and away from other people in your home. Also, you should use a separate bathroom, if available.  Animals: You should restrict contact with pets and other animals while you are sick with COVID-19, just like you would around other people. Although there have not been reports of pets or other animals becoming sick with COVID-19, it is still recommended that people sick with COVID-19 limit contact with animals until more information is known about the virus. When possible, have another member of your household care for your animals while you are sick. If you are sick with COVID-19, avoid contact with your pet, including petting, snuggling, being kissed or licked, and sharing food. If you must care for your pet or be around animals while you are sick, wash your hands before and after you interact with  pets and wear a facemask. See COVID-19 and Animals for more information.    Call ahead before visiting your doctor    If you have a medical appointment, call the healthcare provider and tell them that you have or may have COVID-19. This will help the healthcare provider’s office take steps to keep other people from getting infected or exposed.    Wear a facemask    You should wear a facemask when you are around other people (e.g., sharing a room or vehicle) or pets and before you enter a healthcare provider’s office. If you are not able to wear a facemask (for example, because it causes trouble breathing), then people who live with you should not stay in the same room with you, or they should wear a facemask if they enter your room.    Cover your coughs and sneezes    Cover your mouth and nose with a tissue when you cough or sneeze. Throw used tissues in a lined trash can. Immediately wash your hands with soap and water for at least 20 seconds or, if soap and water are not available, clean your hands with an alcohol-based hand  that contains at least 60% alcohol.    Clean your hands often    Wash your hands often with soap and water for at least 20 seconds, especially after blowing your nose, coughing, or sneezing; going to the bathroom; and before eating or preparing food. If soap and water are not readily available, use an alcohol-based hand  with at least 60% alcohol, covering all surfaces of your hands and rubbing them together until they feel dry.  Soap and water are the best option if hands are visibly dirty. Avoid touching your eyes, nose, and mouth with unwashed hands.    Avoid sharing personal household items    You should not share dishes, drinking glasses, cups, eating utensils, towels, or bedding with other people or pets in your home. After using these items, they should be washed thoroughly with soap and water.    Clean all “high-touch” surfaces everyday    High touch surfaces include  counters, tabletops, doorknobs, bathroom fixtures, toilets, phones, keyboards, tablets, and bedside tables. Also, clean any surfaces that may have blood, stool, or body fluids on them. Use a household cleaning spray or wipe, according to the label instructions. Labels contain instructions for safe and effective use of the cleaning product including precautions you should take when applying the product, such as wearing gloves and making sure you have good ventilation during use of the product.    Monitor your symptoms    Seek prompt medical attention if your illness is worsening (e.g., difficulty breathing). Before seeking care, call your healthcare provider and tell them that you have, or are being evaluated for, COVID-19. Put on a facemask before you enter the facility. These steps will help the healthcare provider’s office to keep other people in the office or waiting room from getting infected or exposed. Ask your healthcare provider to call the local or [facility] health department. Persons who are placed under active monitoring or facilitated self-monitoring should follow instructions provided by their local health department or occupational health professionals, as appropriate.  If you have a medical emergency and need to call 911, notify the dispatch personnel that you have, or are being evaluated for COVID-19. If possible, put on a facemask before emergency medical services arrive.    Discontinuing home isolation    Patients with confirmed COVID-19 should remain under home isolation precautions until the following conditions are met:   They have had no fever for at least 24 hours (that is one full day of no fever without the use medicine that reduces fevers)  AND  other symptoms have improved (for example, when their cough or shortness of breath have improved)  AND  If had mild or moderate illness, at least 10 days have passed since their symptoms first appeared or if severe illness (needed oxygen) or  immunosuppressed, at least 20 days have passed since symptoms first appeared  Patients with confirmed COVID-19 should also notify close contacts (including their workplace) and ask that they self-quarantine. Currently, close contact is defined as being within 6 feet for 15 minutes or more from the period 24 hours starting 48 hours before symptom onset to the time at which the patient went into isolation.  Close contacts of patients diagnosed with COVID-19 should be instructed by the patient to self-quarantine for 14 days from the last time of their last contact with the patient.     Source: https://www.cdc.gov/coronavirus/2019-ncov/hcp/guidance-prevent-spread.html

## 2024-02-12 NOTE — LETTER
February 12, 2024     Patient: Skyla Loving   YOB: 1995   Date of Visit: 2/12/2024       To Whom It May Concern:    It is my medical opinion that Skyla Loving may return if COVID test is negative and patient has been fever free for 24 hours without the use of a fever reducing agent.  If COVID test is positive, patient may return on 2/17/2024 and when fever free for 24 hours without the use of a fever reducing agent.  Upon return, the patient must then adhere to strict masking for an additional 5 days.      If you have any questions or concerns, please don't hesitate to call.         Sincerely,        Cortney Valle PA-C    CC: No Recipients

## 2024-02-13 LAB — SARS-COV-2 RNA RESP QL NAA+PROBE: NEGATIVE

## 2024-03-26 NOTE — ANESTHESIA PREPROCEDURE EVALUATION
Consider obtaining \"The Breather\" device on [employer].        Procedure:  LABOR ANALGESIA    Relevant Problems   ANESTHESIA (within normal limits)      CARDIO (within normal limits)      ENDO (within normal limits)      GI/HEPATIC (within normal limits)      /RENAL (within normal limits)      GYN   (+) 39 weeks gestation of pregnancy      HEMATOLOGY   (+) Anemia      MUSCULOSKELETAL (within normal limits)      NEURO/PSYCH (within normal limits)      PULMONARY   (+) Maternal asthma complicating pregnancy        Physical Exam    Airway    Mallampati score: II  TM Distance: >3 FB  Neck ROM: full     Dental   No notable dental hx     Cardiovascular  Rhythm: regular, Rate: normal, Cardiovascular exam normal    Pulmonary  Pulmonary exam normal Breath sounds clear to auscultation,     Other Findings        Anesthesia Plan  ASA Score- 2     Anesthesia Type- epidural with ASA Monitors  Additional Monitors:   Airway Plan:           Plan Factors-Exercise tolerance (METS): >4 METS  Chart reviewed  Existing labs reviewed  Patient summary reviewed  Patient is a current smoker  Induction-     Postoperative Plan-     Informed Consent- Anesthetic plan and risks discussed with patient

## 2024-05-31 ENCOUNTER — VBI (OUTPATIENT)
Dept: ADMINISTRATIVE | Facility: OTHER | Age: 29
End: 2024-05-31

## 2025-05-01 ENCOUNTER — TELEPHONE (OUTPATIENT)
Age: 30
End: 2025-05-01

## 2025-05-19 ENCOUNTER — TELEPHONE (OUTPATIENT)
Dept: OBGYN CLINIC | Facility: MEDICAL CENTER | Age: 30
End: 2025-05-19

## 2025-05-19 NOTE — TELEPHONE ENCOUNTER
Patient called to ask about scheduling an appointment, she went to [facility] in December 2024 and was told she was having a miscarriage, she was bleeding for 2 months, patient called us this morning stating that she has stopped bleeding and she has a pregnancy belly with movement, she is asking for an appointment to clarify how far along she might be.  Reached out to supervisors and they found an appointment for 05/21, I called the patient and she took it.

## 2025-05-21 ENCOUNTER — TELEPHONE (OUTPATIENT)
Dept: OBGYN CLINIC | Facility: MEDICAL CENTER | Age: 30
End: 2025-05-21

## 2025-05-21 ENCOUNTER — OFFICE VISIT (OUTPATIENT)
Dept: OBGYN CLINIC | Facility: MEDICAL CENTER | Age: 30
End: 2025-05-21

## 2025-05-21 ENCOUNTER — INITIAL PRENATAL (OUTPATIENT)
Dept: OBGYN CLINIC | Facility: MEDICAL CENTER | Age: 30
End: 2025-05-21

## 2025-05-21 VITALS
DIASTOLIC BLOOD PRESSURE: 70 MMHG | HEIGHT: 62 IN | BODY MASS INDEX: 31.1 KG/M2 | SYSTOLIC BLOOD PRESSURE: 110 MMHG | WEIGHT: 169 LBS

## 2025-05-21 DIAGNOSIS — Z32.01 PREGNANCY EXAMINATION OR TEST, POSITIVE RESULT: Primary | ICD-10-CM

## 2025-05-21 DIAGNOSIS — Z34.83 PRENATAL CARE, SUBSEQUENT PREGNANCY IN THIRD TRIMESTER: Primary | ICD-10-CM

## 2025-05-21 DIAGNOSIS — Z76.89 ENCOUNTER FOR SOCIAL WORK INTERVENTION: ICD-10-CM

## 2025-05-21 DIAGNOSIS — O99.340 ANXIETY DURING PREGNANCY: ICD-10-CM

## 2025-05-21 DIAGNOSIS — K59.00 CONSTIPATION, UNSPECIFIED CONSTIPATION TYPE: ICD-10-CM

## 2025-05-21 DIAGNOSIS — Z72.0 CURRENT EVERY DAY NICOTINE VAPING: ICD-10-CM

## 2025-05-21 DIAGNOSIS — F41.9 ANXIETY: ICD-10-CM

## 2025-05-21 DIAGNOSIS — F41.9 ANXIETY DURING PREGNANCY: ICD-10-CM

## 2025-05-21 DIAGNOSIS — Z71.6 ENCOUNTER FOR SMOKING CESSATION COUNSELING: ICD-10-CM

## 2025-05-21 RX ORDER — SENNOSIDES 8.6 MG
650 CAPSULE ORAL EVERY 8 HOURS PRN
COMMUNITY

## 2025-05-21 RX ORDER — SERTRALINE HYDROCHLORIDE 25 MG/1
25 TABLET, FILM COATED ORAL DAILY
Qty: 90 TABLET | Refills: 0 | Status: SHIPPED | OUTPATIENT
Start: 2025-05-21

## 2025-05-21 RX ORDER — MELATONIN/PYRIDOXINE HCL (B6) 2.5MG-10MG
TABLET, EXTENDED RELEASE ORAL
COMMUNITY

## 2025-05-21 RX ORDER — DOCUSATE SODIUM 100 MG/1
100 CAPSULE, LIQUID FILLED ORAL 2 TIMES DAILY
Qty: 90 CAPSULE | Refills: 0 | Status: SHIPPED | OUTPATIENT
Start: 2025-05-21

## 2025-05-21 NOTE — PROGRESS NOTES
OB INTAKE INTERVIEW May 22, 2025    Patient is 29 y.o. who presents for OB intake at 33w2d.  She is unaccompanied during this encounter.  The father of her baby (Tip) is involved in the pregnancy      No LMP recorded (lmp unknown). Patient is pregnant.  Ultrasound: Measured 33 weeks 2 days on 2025  Estimated Date of Delivery: 25 established by dating ultrasound due to unknown LMP.    Signs/Symptoms of Pregnancy  Current pregnancy symptoms: discomfort related to sciatica  Constipation yes - hemorrhoids  Headaches yes  Cramping/spotting no  PICA cravings no    Diabetes  If patient has 1 or more, please order early 1 hour GTT  History of GDM no  BMI >35 no  History of PCOS or current metformin use no  History of LGA/macrosomic infant (4000g/9lbs) yes - 4065g    If patient has 2 or more, please order early 1 hour GTT  BMI>30 yes  AMA no  First degree relative with type 2 diabetes no  History of chronic HTN, hyperlipidemia, elevated A1C no  High risk race (, , ,  or ) no    Hypertension  History of of chronic HTN no  History of gestational HTN no  History of preeclampsia, eclampsia, or HELLP syndrome no  History of diabetes no  History of lupus, sjogrens syndrome, kidney disease no    Thyroid  History of thyroid disease no    Bleeding Disorder or Hx of DVT (Factor V, antithrombin III, prothrombin gene mutation, protein C and S Ag, lupus anticoagulant, anticardiolipin, beta-2 glycoprotein): no    OB/GYN:  History of abnormal pap smear n/a  Date of last pap smear : Patient reports never had it  History of HPV n/a  History of Herpes/HSV no  History of other STI (gonorrhea, chlamydia, trich) no  History of prior  yes  History of prior  no  History of  delivery prior to 36 weeks 6 days no  History of blood transfusion yes -   Ok for blood transfusion yes    Social determinants:  History of tobacco/nicotine use: yes  Currently using  tobacco/nicotine: yes - vapes daily    MRSA Screening:   Does the pt have a hx of MRSA? no    Immunizations:  History of Varicella or Vaccination 2003  Influenza vaccine given this season NO   Discussed Tdap vaccine YES   COVID Vaccine NO - Declined     Genetic/M:  Do you or your partner have a history of any of the following in yourselves or first degree relatives?  Cystic fibrosis no  Spinal muscular atrophy no  Hemoglobinopathy/Sickle Cell/Thalassemia no  Fragile X Intellectual Disability no    Appointment for anatomy scan at [facility] has not been scheduled.  Referral provided today.    Interview education  [facility] Pregnancy Essentials Book reviewed, discussed and attached to their AVS YES     Prenatal lab work scripts YES      Aspirin/Preeclampsia Screen    Risk Level Risk Factor Recommendation   LOW Prior Uncomplicated full-term delivery yes No Aspirin recommendation        MODERATE Nulliparity no Recommend low-dose aspirin if     BMI>30 yes - currently 33w 2 or more moderate risk factors    Family History Preeclampsia (mother/sister) no     35yr old or greater no     Black Race, Concern for SDOH/Low Socioeconomic yes     IVF Pregnancy  no     Personal History Risks (low birth weight, prior adverse preg outcome, >10yr preg interval) no         HIGH History of Preeclampsia no Recommend low-dose aspirin if     Multifetal gestation no 1 or more high risk factors    Chronic HTN no     Type 1 or 2 Diabetes no     Renal Disease no     Autoimmune Disease  no      Contraindications to ASA therapy:  NSAID/ ASA allergy: no  Nasal polyps: no  Asthma with history of ASA induced bronchospasm: no  Relative contraindications:  History of GI bleed: no  Active peptic ulcer disease: no  Severe hepatic dysfunction: no    The patient has a history now or in prior pregnancy notable for: none    Details that I feel the provider should be aware of: Skyla came in for her OB intake at 33w2d. No PNC. Patient reports thought lost  pregnancy in December since she bled for several weeks straight. States started to feel movement suddenly and that is when patient called to get eval. Patient c/o discomfort related to sciatica, constipation and HA. Patient declined PT referral for sciatica pain but will reach out if changed mind. Patient reports constipatio has always been an issue. Care advise provided. HA resolves with Tylenol. Safe medications to take during pregnancy and warning signs reviewed. Patient with h/o  x3. Prenatal panel and early 1 hour glucose ordered today. [facility] referral placed. Message sent to [facility] to get patient scheduled ASAP for formal dating and anatomy scan. +SDOH. Patient agreeable to SW referral. Patient currently vaping nicotine every day. Cessation discussed and patient open to smoking cessation referral. EPDS at today's visit at 15. Patient reports increased anxiety. Patient states has been on Zoloft previously and would like to restart. Discussed with provider and script sent to patient's preferred pharmacy. Patient considering  this time around as maternal request. Patient would also like to get tubal ligation at the same time if possible.     PN1 visit scheduled. The patient was oriented to our practice, the navigator role, reviewed delivering physicians and [facility] for delivery. All questions were answered.    Interviewed by: Cailin KHOURY RN

## 2025-05-21 NOTE — PATIENT INSTRUCTIONS
Congratulations!! Please review our Pregnancy Essential Guide and [facility] L&D Virtual tour from our networks website.    [facility] Pregnancy Essentials Guide  [facility] ([email or web address])     [facility] ([employer])

## 2025-05-21 NOTE — PROGRESS NOTES
"Pregnancy Confirmation Visit  [facility] [address], [place], [place]    Assessment/Plan:  29 y.o.  presenting with missed menses.      No LMP recorded (lmp unknown). Patient is pregnant.  EDC - ?    @ ??    Sonogram EDC  33w2d      Final EDC 25    by   Sonogram           - Continue/start prenatal vitamin  - We reviewed her current medications and discussed which are safe to continue in pregnancy  - We briefly discussed options for aneuploidy screening, to be discussed further at the prenatal intake  - Schedule prenatal intake with RN and initial prenatal visit; prenatal labs will be ordered during the prenatal intake      Subjective:    CC: Missed period    Skyla Loving is a 29 y.o.  who presents with missed menses.  No LMP recorded (lmp unknown). Patient is pregnant.    Patient notes that this pregnancy was un planned and very desired.  She was not using contraception at the time of conception. She reports she is no idea of her LMP and that she has regular menses. She has has +  vaginal bleeding since her LMP. X 2 months     Objective:  /70   Ht 5' 2\" (1.575 m)   Wt 76.7 kg (169 lb)   LMP  (LMP Unknown)   BMI 30.91 kg/m²     Physical Exam:  General: Well appearing, no distress  CV: Regular rate  Respiratory: Unlabored breathing  Abdomen: Soft, nontender  Extremities: Without edema  Mood and Affect: Appropriate    Pelvic Ultrasound  [place] IUP  Biometry was done     HC - 33w3d  AC  33w3d  BPD -   33w   FL - 33w0d    MAGGIE - 16      Placenta is on the left  side.                    "

## 2025-05-21 NOTE — TELEPHONE ENCOUNTER
Called patient to schedule out the rest of her OB appointments. Unable to leave message as voice mail box is full. Patient may be offered July 10th @ 4:00 with Alexa Stout in [place] office for her 40 weeks visit and she may be offered 7/14 with Adam Lim at 10:00 at our [place] location for her 41 week appointment. Sent MyChart for patient to contact office to schedule.

## 2025-05-22 ENCOUNTER — TELEPHONE (OUTPATIENT)
Dept: PERINATAL CARE | Facility: OTHER | Age: 30
End: 2025-05-22

## 2025-05-22 ENCOUNTER — PATIENT OUTREACH (OUTPATIENT)
Dept: OBGYN CLINIC | Facility: MEDICAL CENTER | Age: 30
End: 2025-05-22

## 2025-05-22 NOTE — PROGRESS NOTES
SW referred for SDOH (food, housing, transportation, utilities) and late start to [facility]. Patient is currently , 55l7mFO with an MARII of 25. Patient also had an elevated EPDS score of 15 yesterday. She was restarted on Zoloft.     Per notes from Dr. Vera & OB Navigator, patient was seen at [facility] ED in Dec 2024 and believed she was having a miscarriage. Had bleeding for several weeks afterwards, but suddenly felt fetal movement. She took a pregnancy test at the end of March, which was positive. Contacted office to schedule an intake early May, next available was yesterday.     SW called patient today to introduce role & complete assessment. Patient did not answer and VM was full, so SW could not leave a message. LJ sent patient a Within[employer] message requesting a call back.

## 2025-05-22 NOTE — TELEPHONE ENCOUNTER
Called pt to schedule detailed ultrasound as a NP as patient is late to prenatal care. Unable to lvm. Sent [employer] message.

## 2025-05-27 ENCOUNTER — ANCILLARY ORDERS (OUTPATIENT)
Dept: OBGYN CLINIC | Facility: MEDICAL CENTER | Age: 30
End: 2025-05-27

## 2025-05-27 DIAGNOSIS — Z3A.34 34 WEEKS GESTATION OF PREGNANCY: Primary | ICD-10-CM

## 2025-05-28 ENCOUNTER — ROUTINE PRENATAL (OUTPATIENT)
Dept: PERINATAL CARE | Facility: OTHER | Age: 30
End: 2025-05-28
Payer: COMMERCIAL

## 2025-05-28 ENCOUNTER — ANCILLARY PROCEDURE (OUTPATIENT)
Dept: PERINATAL CARE | Facility: OTHER | Age: 30
End: 2025-05-28
Attending: OBSTETRICS & GYNECOLOGY
Payer: COMMERCIAL

## 2025-05-28 VITALS
SYSTOLIC BLOOD PRESSURE: 110 MMHG | WEIGHT: 174.8 LBS | HEIGHT: 62 IN | HEART RATE: 94 BPM | BODY MASS INDEX: 32.17 KG/M2 | DIASTOLIC BLOOD PRESSURE: 72 MMHG

## 2025-05-28 DIAGNOSIS — Z3A.34 34 WEEKS GESTATION OF PREGNANCY: Primary | ICD-10-CM

## 2025-05-28 DIAGNOSIS — Z3A.34 34 WEEKS GESTATION OF PREGNANCY: ICD-10-CM

## 2025-05-28 PROCEDURE — 76811 OB US DETAILED SNGL FETUS: CPT

## 2025-05-28 PROCEDURE — 76811 OB US DETAILED SNGL FETUS: CPT | Performed by: OBSTETRICS & GYNECOLOGY

## 2025-05-28 PROCEDURE — 99212 OFFICE O/P EST SF 10 MIN: CPT | Performed by: OBSTETRICS & GYNECOLOGY

## 2025-05-28 NOTE — PROGRESS NOTES
Skyla presents today for establishment of EDC and fetal anatomic evaluation given late prenatal care and unknown LMP.  She has a history of 3 previous term vaginal deliveries.    On exam today, the patient appears well, in no acute distress, and denies any complaints.    The fetal anatomic survey is complete. There is no sonographic evidence of fetal abnormalities at this time. Good fetal movement and tone are seen. The amniotic fluid volume appears normal.  The patient was informed of today's findings and all of her questions were answered. The limitations of ultrasound were reviewed.    We discussed follow-up in detail, and I recommend she return in 4 weeks for a growth evaluation given late prenatal care    Thank you very much for allowing us to participate in the care of this very nice patient. Should you have any questions, please do not hesitate to contact our office.

## 2025-05-29 ENCOUNTER — PATIENT OUTREACH (OUTPATIENT)
Dept: OBGYN CLINIC | Facility: MEDICAL CENTER | Age: 30
End: 2025-05-29

## 2025-05-29 NOTE — PROGRESS NOTES
Per chart review, patient read [employer] message sent last week but did not reply or call SW back. SW placed additional call to patient today (SDOH referral, late start to PNC). No answer. VM box is still full, so SW was unable to leave a message.     Due to lack of response from patient, SW sent UTR letter via [employer]. SW closing referral at this time, please re-refer as needed.

## 2025-05-29 NOTE — LETTER
May 29, 2025    Skyla Loving  [address] [address]        Richard Crum,      I hope you are doing well. I am a  with [facility]. I am reaching out because I have made a few attempts to contact you by phone. Unfortunately, I have not been able to reach you! I apologize if I have been calling at a bad time.       If you are interested in social work services or resources, I kindly request that you contact me at [phone] so that I can assist with your care needs. My normal hours are Monday through Friday, 8:00am to 4:00/4:30pm.      Take care,      Yeimy Lamb LMSW

## 2025-06-04 ENCOUNTER — APPOINTMENT (OUTPATIENT)
Dept: LAB | Facility: MEDICAL CENTER | Age: 30
End: 2025-06-04
Payer: COMMERCIAL

## 2025-06-04 ENCOUNTER — INITIAL PRENATAL (OUTPATIENT)
Dept: OBGYN CLINIC | Facility: MEDICAL CENTER | Age: 30
End: 2025-06-04
Payer: COMMERCIAL

## 2025-06-04 VITALS — SYSTOLIC BLOOD PRESSURE: 120 MMHG | BODY MASS INDEX: 31.46 KG/M2 | WEIGHT: 172 LBS | DIASTOLIC BLOOD PRESSURE: 76 MMHG

## 2025-06-04 DIAGNOSIS — Z3A.34 34 WEEKS GESTATION OF PREGNANCY: Primary | ICD-10-CM

## 2025-06-04 DIAGNOSIS — O09.30 INSUFFICIENT ANTEPARTUM CARE: ICD-10-CM

## 2025-06-04 DIAGNOSIS — Z23 ENCOUNTER FOR IMMUNIZATION: ICD-10-CM

## 2025-06-04 DIAGNOSIS — N89.8 VAGINAL DISCHARGE: ICD-10-CM

## 2025-06-04 DIAGNOSIS — K64.9 HEMORRHOIDS, UNSPECIFIED HEMORRHOID TYPE: ICD-10-CM

## 2025-06-04 DIAGNOSIS — Z32.01 PREGNANCY EXAMINATION OR TEST, POSITIVE RESULT: ICD-10-CM

## 2025-06-04 DIAGNOSIS — M79.662 PAIN OF LEFT CALF: ICD-10-CM

## 2025-06-04 DIAGNOSIS — Z30.2 REQUEST FOR STERILIZATION: ICD-10-CM

## 2025-06-04 LAB
ABO GROUP BLD: NORMAL
BACTERIA UR QL AUTO: ABNORMAL /HPF
BASOPHILS # BLD AUTO: 0.01 THOUSANDS/ÂΜL (ref 0–0.1)
BASOPHILS NFR BLD AUTO: 0 % (ref 0–1)
BILIRUB UR QL STRIP: NEGATIVE
BLD GP AB SCN SERPL QL: NEGATIVE
CLARITY UR: ABNORMAL
COLOR UR: YELLOW
EOSINOPHIL # BLD AUTO: 0.06 THOUSAND/ÂΜL (ref 0–0.61)
EOSINOPHIL NFR BLD AUTO: 1 % (ref 0–6)
ERYTHROCYTE [DISTWIDTH] IN BLOOD BY AUTOMATED COUNT: 15.7 % (ref 11.6–15.1)
FERRITIN SERPL-MCNC: 6 NG/ML (ref 30–307)
GLUCOSE UR STRIP-MCNC: NEGATIVE MG/DL
HCT VFR BLD AUTO: 31.2 % (ref 34.8–46.1)
HGB BLD-MCNC: 9.3 G/DL (ref 11.5–15.4)
HGB UR QL STRIP.AUTO: NEGATIVE
IMM GRANULOCYTES # BLD AUTO: 0.04 THOUSAND/UL (ref 0–0.2)
IMM GRANULOCYTES NFR BLD AUTO: 1 % (ref 0–2)
KETONES UR STRIP-MCNC: NEGATIVE MG/DL
LEUKOCYTE ESTERASE UR QL STRIP: ABNORMAL
LYMPHOCYTES # BLD AUTO: 1.97 THOUSANDS/ÂΜL (ref 0.6–4.47)
LYMPHOCYTES NFR BLD AUTO: 27 % (ref 14–44)
MCH RBC QN AUTO: 23.8 PG (ref 26.8–34.3)
MCHC RBC AUTO-ENTMCNC: 29.8 G/DL (ref 31.4–37.4)
MCV RBC AUTO: 80 FL (ref 82–98)
MONOCYTES # BLD AUTO: 0.55 THOUSAND/ÂΜL (ref 0.17–1.22)
MONOCYTES NFR BLD AUTO: 7 % (ref 4–12)
MUCOUS THREADS UR QL AUTO: ABNORMAL
NEUTROPHILS # BLD AUTO: 4.79 THOUSANDS/ÂΜL (ref 1.85–7.62)
NEUTS SEG NFR BLD AUTO: 64 % (ref 43–75)
NITRITE UR QL STRIP: NEGATIVE
NON-SQ EPI CELLS URNS QL MICRO: ABNORMAL /HPF
NRBC BLD AUTO-RTO: 0 /100 WBCS
PH UR STRIP.AUTO: 6 [PH]
PLATELET # BLD AUTO: 243 THOUSANDS/UL (ref 149–390)
PMV BLD AUTO: 10.6 FL (ref 8.9–12.7)
PROT UR STRIP-MCNC: ABNORMAL MG/DL
RBC # BLD AUTO: 3.91 MILLION/UL (ref 3.81–5.12)
RBC #/AREA URNS AUTO: ABNORMAL /HPF
RH BLD: POSITIVE
RUBV IGG SERPL IA-ACNC: 25.4 IU/ML
SP GR UR STRIP.AUTO: 1.03 (ref 1–1.03)
SPECIMEN EXPIRATION DATE: NORMAL
UROBILINOGEN UR STRIP-ACNC: 2 MG/DL
WBC # BLD AUTO: 7.42 THOUSAND/UL (ref 4.31–10.16)
WBC #/AREA URNS AUTO: ABNORMAL /HPF

## 2025-06-04 PROCEDURE — 87491 CHLMYD TRACH DNA AMP PROBE: CPT | Performed by: OBSTETRICS & GYNECOLOGY

## 2025-06-04 PROCEDURE — 99213 OFFICE O/P EST LOW 20 MIN: CPT | Performed by: OBSTETRICS & GYNECOLOGY

## 2025-06-04 PROCEDURE — 87340 HEPATITIS B SURFACE AG IA: CPT

## 2025-06-04 PROCEDURE — 87086 URINE CULTURE/COLONY COUNT: CPT

## 2025-06-04 PROCEDURE — 87660 TRICHOMONAS VAGIN DIR PROBE: CPT | Performed by: OBSTETRICS & GYNECOLOGY

## 2025-06-04 PROCEDURE — 36415 COLL VENOUS BLD VENIPUNCTURE: CPT

## 2025-06-04 PROCEDURE — 82728 ASSAY OF FERRITIN: CPT

## 2025-06-04 PROCEDURE — 86850 RBC ANTIBODY SCREEN: CPT

## 2025-06-04 PROCEDURE — 86900 BLOOD TYPING SEROLOGIC ABO: CPT

## 2025-06-04 PROCEDURE — 81001 URINALYSIS AUTO W/SCOPE: CPT

## 2025-06-04 PROCEDURE — 87389 HIV-1 AG W/HIV-1&-2 AB AG IA: CPT

## 2025-06-04 PROCEDURE — 83020 HEMOGLOBIN ELECTROPHORESIS: CPT

## 2025-06-04 PROCEDURE — 86780 TREPONEMA PALLIDUM: CPT

## 2025-06-04 PROCEDURE — 86901 BLOOD TYPING SEROLOGIC RH(D): CPT

## 2025-06-04 PROCEDURE — 86706 HEP B SURFACE ANTIBODY: CPT

## 2025-06-04 PROCEDURE — 90715 TDAP VACCINE 7 YRS/> IM: CPT

## 2025-06-04 PROCEDURE — 87591 N.GONORRHOEAE DNA AMP PROB: CPT | Performed by: OBSTETRICS & GYNECOLOGY

## 2025-06-04 PROCEDURE — 87480 CANDIDA DNA DIR PROBE: CPT | Performed by: OBSTETRICS & GYNECOLOGY

## 2025-06-04 PROCEDURE — 86762 RUBELLA ANTIBODY: CPT

## 2025-06-04 PROCEDURE — 90471 IMMUNIZATION ADMIN: CPT

## 2025-06-04 PROCEDURE — 85025 COMPLETE CBC W/AUTO DIFF WBC: CPT

## 2025-06-04 PROCEDURE — 86803 HEPATITIS C AB TEST: CPT

## 2025-06-04 PROCEDURE — 87510 GARDNER VAG DNA DIR PROBE: CPT | Performed by: OBSTETRICS & GYNECOLOGY

## 2025-06-04 RX ORDER — HYDROCORTISONE 25 MG/G
CREAM TOPICAL 2 TIMES DAILY
Qty: 28 G | Refills: 1 | Status: SHIPPED | OUTPATIENT
Start: 2025-06-04

## 2025-06-04 RX ORDER — FLUCONAZOLE 150 MG/1
150 TABLET ORAL ONCE
Qty: 1 TABLET | Refills: 0 | Status: SHIPPED | OUTPATIENT
Start: 2025-06-04 | End: 2025-06-04

## 2025-06-04 NOTE — ASSESSMENT & PLAN NOTE
MA 31  discussed and signed today    Aware if   could  do IUD postplacental - states would  want this  but  signed MA 31 because if has C/S  would  want tubal done

## 2025-06-04 NOTE — PROGRESS NOTES
Name: Skyla Loving      : 1995      MRN: [id number]  Encounter Provider: Alexa Stout MD  Encounter Date: 2025   Encounter department: [facility]  :  Assessment & Plan  Vaginal discharge    Orders:    Chlamydia/GC amplified DNA by PCR    VAGINOSIS DNA PROBE    Pain of left calf    Orders:    VAS VENOUS DUPLEX -LOWER LIMB UNILATERAL; Future  low level of  suspicion but  given continuous pain  - duplex ordered   34 weeks gestation of pregnancy       dating from MFM scan - see report  on working EDC  which  was  changed in  her  chart    FKC and  Labor precautions reviewed   Encouraged to get labs  done  as she has not done this    Follow  up in 2 weeks   Desires  IOL as she has  logistic reasons to be  able to plan  timing of delivery   Encounter for immunization    Orders:    Tdap Vaccine greater than or equal to 8yo    Request for sterilization       MA 31  discussed and signed today    Aware if   could  do IUD postplacental - states would  want this  but  signed MA 31 because if has C/S  would  want tubal done   Insufficient antepartum care         Has not  spoken to   - their number was provided today  in order to have  assistance if  needed      History of Present Illness   HPI  Skyla Loving is a 29 y.o. female who presents for initial OB visit   +FM / NO lof or  VB, no contractions   +vaginal discharge , hemorroids and  left calf pain  No SOB   History obtained from: patient    Review of Systems   All other systems reviewed and are negative.         Objective   /76   Wt 78 kg (172 lb)   LMP  (LMP Unknown)   BMI 31.46 kg/m²      Physical Exam  Vitals reviewed.   Constitutional:       Appearance: Normal appearance.   HENT:      Head: Normocephalic and atraumatic.      Nose: Nose normal.     Eyes:      Conjunctiva/sclera: Conjunctivae normal.     Pulmonary:      Effort: Pulmonary effort is normal.   Abdominal:       Palpations: Abdomen is soft.      Comments: Gravid  non tender     Genitourinary:     General: Normal vulva.      Vagina: Vaginal discharge present.      Cervix: Normal.      Uterus: Enlarged.       Comments: Thick chunky discharge      Neurological:      General: No focal deficit present.      Mental Status: She is alert and oriented to person, place, and time.     Psychiatric:         Mood and Affect: Mood normal.         Behavior: Behavior normal.

## 2025-06-05 ENCOUNTER — RESULTS FOLLOW-UP (OUTPATIENT)
Dept: OBGYN CLINIC | Facility: MEDICAL CENTER | Age: 30
End: 2025-06-05

## 2025-06-05 DIAGNOSIS — B96.89 BV (BACTERIAL VAGINOSIS): Primary | ICD-10-CM

## 2025-06-05 DIAGNOSIS — N76.0 BV (BACTERIAL VAGINOSIS): Primary | ICD-10-CM

## 2025-06-05 DIAGNOSIS — B37.9 CANDIDA INFECTION: ICD-10-CM

## 2025-06-05 LAB
C TRACH DNA SPEC QL NAA+PROBE: NEGATIVE
CANDIDA RRNA VAG QL PROBE: DETECTED
G VAGINALIS RRNA GENITAL QL PROBE: DETECTED
HBV SURFACE AB SER-ACNC: 9.29 MIU/ML
HBV SURFACE AG SER QL: NORMAL
HCV AB SER QL: NORMAL
HIV 1+2 AB+HIV1 P24 AG SERPL QL IA: NORMAL
N GONORRHOEA DNA SPEC QL NAA+PROBE: NEGATIVE
T VAGINALIS RRNA GENITAL QL PROBE: NOT DETECTED
TREPONEMA PALLIDUM IGG+IGM AB [PRESENCE] IN SERUM OR PLASMA BY IMMUNOASSAY: NORMAL

## 2025-06-05 RX ORDER — FLUCONAZOLE 150 MG/1
150 TABLET ORAL ONCE
Qty: 1 TABLET | Refills: 0 | Status: SHIPPED | OUTPATIENT
Start: 2025-06-05 | End: 2025-06-05

## 2025-06-05 RX ORDER — METRONIDAZOLE 500 MG/1
500 TABLET ORAL EVERY 12 HOURS SCHEDULED
Qty: 14 TABLET | Refills: 0 | Status: SHIPPED | OUTPATIENT
Start: 2025-06-05 | End: 2025-06-12

## 2025-06-06 ENCOUNTER — RESULTS FOLLOW-UP (OUTPATIENT)
Dept: OBGYN CLINIC | Facility: CLINIC | Age: 30
End: 2025-06-06

## 2025-06-06 DIAGNOSIS — D50.8 IRON DEFICIENCY ANEMIA SECONDARY TO INADEQUATE DIETARY IRON INTAKE: Primary | ICD-10-CM

## 2025-06-06 LAB
BACTERIA UR CULT: ABNORMAL
BACTERIA UR CULT: ABNORMAL
HGB A MFR BLD: 2.2 % (ref 1.8–3.2)
HGB A MFR BLD: 97.8 % (ref 96.4–98.8)
HGB F MFR BLD: 0 % (ref 0–2)
HGB FRACT BLD-IMP: NORMAL
HGB S MFR BLD: 0 %

## 2025-06-06 RX ORDER — SODIUM CHLORIDE 9 MG/ML
20 INJECTION, SOLUTION INTRAVENOUS ONCE
Status: CANCELLED | OUTPATIENT
Start: 2025-06-06

## 2025-06-09 PROBLEM — Z3A.35 35 WEEKS GESTATION OF PREGNANCY: Status: ACTIVE | Noted: 2025-06-09

## 2025-06-09 PROBLEM — D50.8 IRON DEFICIENCY ANEMIA SECONDARY TO INADEQUATE DIETARY IRON INTAKE: Status: ACTIVE | Noted: 2022-11-22

## 2025-06-11 DIAGNOSIS — D50.8 IRON DEFICIENCY ANEMIA SECONDARY TO INADEQUATE DIETARY IRON INTAKE: Primary | ICD-10-CM

## 2025-06-11 RX ORDER — SODIUM CHLORIDE 9 MG/ML
20 INJECTION, SOLUTION INTRAVENOUS ONCE
OUTPATIENT
Start: 2025-06-23

## 2025-06-19 ENCOUNTER — TELEPHONE (OUTPATIENT)
Dept: INFUSION CENTER | Facility: HOSPITAL | Age: 30
End: 2025-06-19

## 2025-06-19 NOTE — TELEPHONE ENCOUNTER
Call forwarded to voicemail. Message left to remind patient of upcoming appointment on 6/23/25 at 1200. In voicemail patient informed to call back with any questions or concerns.

## 2025-06-23 ENCOUNTER — HOSPITAL ENCOUNTER (OUTPATIENT)
Dept: INFUSION CENTER | Facility: HOSPITAL | Age: 30
Discharge: HOME | End: 2025-06-23
Attending: OBSTETRICS & GYNECOLOGY
Payer: COMMERCIAL

## 2025-06-23 VITALS
OXYGEN SATURATION: 97 % | DIASTOLIC BLOOD PRESSURE: 66 MMHG | RESPIRATION RATE: 16 BRPM | TEMPERATURE: 97.7 F | SYSTOLIC BLOOD PRESSURE: 124 MMHG | HEART RATE: 90 BPM

## 2025-06-23 DIAGNOSIS — D50.8 IRON DEFICIENCY ANEMIA SECONDARY TO INADEQUATE DIETARY IRON INTAKE: Primary | ICD-10-CM

## 2025-06-23 PROCEDURE — 96365 THER/PROPH/DIAG IV INF INIT: CPT

## 2025-06-23 RX ORDER — SODIUM CHLORIDE 9 MG/ML
20 INJECTION, SOLUTION INTRAVENOUS ONCE
Status: COMPLETED | OUTPATIENT
Start: 2025-06-23 | End: 2025-06-23

## 2025-06-23 RX ORDER — SODIUM CHLORIDE 9 MG/ML
20 INJECTION, SOLUTION INTRAVENOUS ONCE
Status: CANCELLED | OUTPATIENT
Start: 2025-06-25

## 2025-06-23 RX ADMIN — IRON SUCROSE 200 MG: 20 INJECTION, SOLUTION INTRAVENOUS at 11:54

## 2025-06-23 RX ADMIN — SODIUM CHLORIDE 20 ML/HR: 0.9 INJECTION, SOLUTION INTRAVENOUS at 11:54

## 2025-06-23 NOTE — PROGRESS NOTES
Skyla Loving  tolerated treatment well with no complications.      Skyla Loving is aware of future appt on 6/25/25 at 1100.     AVS declined, patient uses MyChart.

## 2025-06-23 NOTE — PLAN OF CARE
Problem: Potential for Falls  Goal: Patient will remain free of falls  Description: INTERVENTIONS:  - Educate patient/family on patient safety including physical limitations  - Instruct patient to call for assistance with activity   - Consider consulting OT/PT to assist with strengthening/mobility based on AM PAC & JH-HLM score  - Consult OT/PT to assist with strengthening/mobility   - Keep Call bell within reach  - Keep bed low and locked with side rails adjusted as appropriate  - Keep care items and personal belongings within reach  - Initiate and maintain comfort rounds  - Consider moving patient to room near nurses station  Outcome: Progressing     Problem: INFECTION - ADULT  Goal: Absence or prevention of progression during hospitalization  Description: INTERVENTIONS:  - Assess and monitor for signs and symptoms of infection  - Monitor lab/diagnostic results  - Monitor all insertion sites, i.e. indwelling lines, tubes, and drains  - Monitor endotracheal if appropriate and nasal secretions for changes in amount and color  - [place] appropriate cooling/warming therapies per order  - Administer medications as ordered  - Instruct and encourage patient and family to use good hand hygiene technique  - Identify and instruct in appropriate isolation precautions for identified infection/condition  Outcome: Progressing     Problem: Knowledge Deficit  Goal: Patient/family/caregiver demonstrates understanding of disease process, treatment plan, medications, and discharge instructions  Description: Complete learning assessment and assess knowledge base.  Interventions:  - Provide teaching at level of understanding  - Provide teaching via preferred learning methods  Outcome: Progressing

## 2025-06-24 ENCOUNTER — ROUTINE PRENATAL (OUTPATIENT)
Dept: OBGYN CLINIC | Facility: MEDICAL CENTER | Age: 30
End: 2025-06-24
Payer: COMMERCIAL

## 2025-06-24 VITALS — WEIGHT: 181 LBS | SYSTOLIC BLOOD PRESSURE: 110 MMHG | BODY MASS INDEX: 33.11 KG/M2 | DIASTOLIC BLOOD PRESSURE: 66 MMHG

## 2025-06-24 DIAGNOSIS — O09.30 INSUFFICIENT ANTEPARTUM CARE: ICD-10-CM

## 2025-06-24 DIAGNOSIS — Z30.2 REQUEST FOR STERILIZATION: ICD-10-CM

## 2025-06-24 DIAGNOSIS — D50.8 IRON DEFICIENCY ANEMIA SECONDARY TO INADEQUATE DIETARY IRON INTAKE: ICD-10-CM

## 2025-06-24 DIAGNOSIS — Z34.93 THIRD TRIMESTER PREGNANCY: Primary | ICD-10-CM

## 2025-06-24 DIAGNOSIS — Z3A.37 37 WEEKS GESTATION OF PREGNANCY: ICD-10-CM

## 2025-06-24 PROCEDURE — 99214 OFFICE O/P EST MOD 30 MIN: CPT | Performed by: OBSTETRICS & GYNECOLOGY

## 2025-06-24 PROCEDURE — 87150 DNA/RNA AMPLIFIED PROBE: CPT | Performed by: OBSTETRICS & GYNECOLOGY

## 2025-06-24 NOTE — PROGRESS NOTES
Assessment  29 y.o.  at 37w2d presenting for routine prenatal visit.     Plan  Diagnoses and all orders for this visit:    Third trimester pregnancy  37 weeks gestation of pregnancy  -     Labor precautions  - FKC  - Strep B DNA probe, amplification  - Return in 1wk for PN    Request for sterilization  - MA31 signed     Insufficient antepartum care  - Late dx     Iron deficiency anemia secondary to inadequate dietary iron intake  - Ongoing venofer infusions scheduled through MARII    ____________________________________________________________        Subjective    Skyla Loving is a 29 y.o.  at 37w2d who presents for routine prenatal visit. She is without complaint. She has some irregular contractions. Denies loss of fluid, or vaginal bleeding. She feels regular fetal movements.     Pregnancy Problems:  Problem List[1]      Objective  /66   Wt 82.1 kg (181 lb)   LMP  (LMP Unknown)   BMI 33.11 kg/m²     FHT: 128 BPM   Uterine Size: size equals dates   Presentations: cephalic   Pelvic Exam:     Dilation: 3cm    Effacement: 50%    Station:  -2    Consistency: soft    Position: middle     Physical Exam:  Physical Exam  Constitutional:       General: She is not in acute distress.     Appearance: Normal appearance. She is well-developed. She is not ill-appearing, toxic-appearing or diaphoretic.   HENT:      Head: Normocephalic and atraumatic.     Eyes:      General: No scleral icterus.        Right eye: No discharge.         Left eye: No discharge.      Conjunctiva/sclera: Conjunctivae normal.     Pulmonary:      Effort: Pulmonary effort is normal. No accessory muscle usage or respiratory distress.   Abdominal:      General: There is distension (gravid).      Tenderness: There is no abdominal tenderness. There is no guarding or rebound.     Skin:     General: Skin is warm and dry.      Coloration: Skin is not jaundiced.      Findings: No bruising, erythema or rash.     Neurological:      Mental  Status: She is alert.     Psychiatric:         Mood and Affect: Mood normal.         Behavior: Behavior normal.         Thought Content: Thought content normal.         Judgment: Judgment normal.                [1]   Patient Active Problem List  Diagnosis    Iron deficiency anemia secondary to inadequate dietary iron intake    Pelvic floor weakness in female    Request for sterilization    Insufficient antepartum care    37 weeks gestation of pregnancy

## 2025-06-25 ENCOUNTER — HOSPITAL ENCOUNTER (OUTPATIENT)
Facility: HOSPITAL | Age: 30
Discharge: HOME | End: 2025-06-25
Attending: OBSTETRICS & GYNECOLOGY | Admitting: OBSTETRICS & GYNECOLOGY
Payer: COMMERCIAL

## 2025-06-25 ENCOUNTER — HOSPITAL ENCOUNTER (OUTPATIENT)
Dept: INFUSION CENTER | Facility: HOSPITAL | Age: 30
Discharge: HOME | End: 2025-06-25
Attending: OBSTETRICS & GYNECOLOGY
Payer: COMMERCIAL

## 2025-06-25 ENCOUNTER — NURSE TRIAGE (OUTPATIENT)
Age: 30
End: 2025-06-25

## 2025-06-25 VITALS
BODY MASS INDEX: 33.31 KG/M2 | WEIGHT: 181 LBS | HEART RATE: 107 BPM | TEMPERATURE: 97.9 F | RESPIRATION RATE: 18 BRPM | DIASTOLIC BLOOD PRESSURE: 74 MMHG | SYSTOLIC BLOOD PRESSURE: 130 MMHG | HEIGHT: 62 IN

## 2025-06-25 VITALS
RESPIRATION RATE: 16 BRPM | SYSTOLIC BLOOD PRESSURE: 123 MMHG | TEMPERATURE: 97.3 F | HEART RATE: 110 BPM | OXYGEN SATURATION: 97 % | DIASTOLIC BLOOD PRESSURE: 73 MMHG

## 2025-06-25 DIAGNOSIS — D50.8 IRON DEFICIENCY ANEMIA SECONDARY TO INADEQUATE DIETARY IRON INTAKE: Primary | ICD-10-CM

## 2025-06-25 PROBLEM — O47.9 UTERINE CONTRACTIONS: Status: ACTIVE | Noted: 2025-06-25

## 2025-06-25 PROCEDURE — 99212 OFFICE O/P EST SF 10 MIN: CPT

## 2025-06-25 PROCEDURE — NC001 PR NO CHARGE: Performed by: OBSTETRICS & GYNECOLOGY

## 2025-06-25 PROCEDURE — 76815 OB US LIMITED FETUS(S): CPT

## 2025-06-25 PROCEDURE — 59025 FETAL NON-STRESS TEST: CPT

## 2025-06-25 PROCEDURE — 96365 THER/PROPH/DIAG IV INF INIT: CPT

## 2025-06-25 RX ORDER — SODIUM CHLORIDE 9 MG/ML
20 INJECTION, SOLUTION INTRAVENOUS ONCE
Status: COMPLETED | OUTPATIENT
Start: 2025-06-25 | End: 2025-06-25

## 2025-06-25 RX ORDER — SODIUM CHLORIDE 9 MG/ML
20 INJECTION, SOLUTION INTRAVENOUS ONCE
Status: CANCELLED | OUTPATIENT
Start: 2025-06-30

## 2025-06-25 RX ADMIN — SODIUM CHLORIDE 20 ML/HR: 0.9 INJECTION, SOLUTION INTRAVENOUS at 10:38

## 2025-06-25 RX ADMIN — IRON SUCROSE 200 MG: 20 INJECTION, SOLUTION INTRAVENOUS at 10:38

## 2025-06-25 NOTE — TELEPHONE ENCOUNTER
Patient reports she is able to have someone drive her to L&D this evening, will arrive at [facility] around 8PM.    ESC to on Call and Charge RN to update.

## 2025-06-25 NOTE — PROGRESS NOTES
Skyla Ordaz Fabienne tolerated Venofer treatment well with no complications.      Skyla Sushmashelby Loving is aware of future appt on 6/30/25 at 1200.     AVS declined, patient uses Mychart.

## 2025-06-25 NOTE — TELEPHONE ENCOUNTER
"REASON FOR CONVERSATION: Rupture of Membranes    SYMPTOMS: leaking of fluid     OTHER HEALTH INFORMATION: Patient 37w3d  with leaking of clear fluid from the vagina. Denies large gush, noticed increased moisture last night but today has noticed clear liquid leaking with standing up/position changes. Occasional 4-5/10 lower abdominal cramping, denies contractions. 7/10 low back pain. Denies vaginal bleeding, has been feeling normal fetal movement. Office visit yesterday 3/50/-2 on exam.     PROTOCOL DISPOSITION: Go to  Now    CARE ADVICE PROVIDED: Go to L&D for evaluation. Patient hesitant to go to [place], she is currently in [place] and asked if she could go to ER there. Advised patient that there is no OBGYN at [facility], that she has to report to [place] for evaluation. Patient states she will review with her fiance to see if he can drive her to [facility]. Advised patient to return call if symptoms change or worsen prior to evaluation.     ESC Vaishali TARIQ  -- agree with recommendation     PRACTICE FOLLOW-UP: none          Reason for Disposition   Leakage of fluid from vagina  (Exception: Patient is uncertain, but thinks it might be urine incontinence.)    Answer Assessment - Initial Assessment Questions  1. ONSET: \"When did you notice the fluid coming out of your vagina?\"         Leaking of clear fluid from vagina, started increasing last night with standing or changing position   2. CONTRACTIONS: \"Are you having any contractions?\" If Yes, ask: \"Describe the contractions that you are having.\" (e.g., duration, frequency, regularity, severity)      Occasional lower abdominal cramping 4-5/10  3. MARII: \"What date are you expecting to deliver?\"      37w3d  4. PARITY: \"Have you had a baby before?\" If Yes, ask: \"How long did the labor last?\"        5. FETAL MOVEMENT: \"Has the baby's movement decreased or changed significantly from normal?\"      Normal movement   6. OTHER SYMPTOMS: \"Do " "you have any other symptoms?\" (e.g., abdomen pain, fever, hand or face swelling, vaginal bleeding)      Pelvic pressure, 7/10 low back pain    Protocols used: Pregnancy - Rupture of Membranes Suspected-Adult-[place]    "

## 2025-06-26 LAB — GP B STREP DNA SPEC QL NAA+PROBE: NEGATIVE

## 2025-06-26 NOTE — PROGRESS NOTES
"L&D Triage Note - OB/GYN  Skyla Loving 29 y.o. female MRN: [id number]  Unit/Bed#: -01 Encounter: [id number]    Patient is seen by [facility]    ASSESSMENT  Skyla Loving is a 29 y.o.  at 37w3d presenting with irregular cramping and leaking of fluid after exam yesterday. Rupture workup negative. SVE unchanged from exam in office on 25, 3/50/2.  NST reactive and reassuring.  Irregular contractions and irritability.  Declining 2h recheck. Stable for discharge. Return precautions given    PLAN  #1. Pregnancy at 37 weeks gestation:   Continue routine prenatal care  Return precautions given      Discharge instructions  Patient instructed to call if experiencing worsening contractions, vaginal bleeding, loss of fluid or decreased fetal movement.  Will follow up with OBGYN on 25.    D/w Dr. Toussaint-Foster  ______________    SUBJECTIVE    MARII: Estimated Date of Delivery: 25    HPI:  29 y.o.  37w3d presents with complaint of irregular cramping and increased watery vaginal discharge since her exam in the office.  She reports period like cramping.  She is unsure of the frequency, but it is irregular.  She has felt like she has had increased watery discharge since her cervical exam without gushes of fluid or continued leaking.  Reports normal fetal movement and denies vaginal bleeding.  Denies chest pain, shortness of breath, lower extremity pain or swelling.      Her obstetrical history is significant for 3 prior vaginal deliveries    ROS:  Constitutional: Negative  Respiratory: Negative  Cardiovascular: Negative    Gastrointestinal: Negative    OBJECTIVE:    Vitals:  /74   Pulse (!) 107   Temp 97.9 °F (36.6 °C) (Temporal)   Resp 18   Ht 5' 2\" (1.575 m)   Wt 82.1 kg (181 lb)   LMP  (LMP Unknown)   BMI 33.11 kg/m²   There is no height or weight on file to calculate BMI.    Physical Exam  Vitals reviewed. Exam conducted with a chaperone present.   Constitutional:       " General: She is not in acute distress.     Appearance: Normal appearance.   HENT:      Head: Normocephalic.      Mouth/Throat:      Pharynx: Oropharynx is clear.     Eyes:      General: No scleral icterus.     Conjunctiva/sclera: Conjunctivae normal.       Cardiovascular:      Rate and Rhythm: Normal rate.   Pulmonary:      Effort: Pulmonary effort is normal.   Abdominal:      Palpations: Abdomen is soft.      Tenderness: There is no abdominal tenderness. There is no guarding.     Musculoskeletal:         General: No tenderness.      Right lower leg: No edema.      Left lower leg: No edema.     Skin:     General: Skin is warm and dry.      Coloration: Skin is not jaundiced.     Neurological:      Mental Status: She is alert.     Psychiatric:         Mood and Affect: Mood normal.         Behavior: Behavior normal.         Speculum exam:  Normal external female genitalia  Cervix fully visualized and visibly dilated 2cm  Physiologic discharge  No bleeding, pooling, abnormal discharge, or lesions noted    Microscopy:     Infection:   - No clue cells    - No hyphae   - No trichomonads present    Membrane status   - No ferning   - Negative nitrazene   - No pooling     SVE:  3/50/-2    FHT:  Baseline Rate (FHR): 120 bpm  Variability: Moderate  Accelerations: 15 x 15 or greater  Decelerations: None    TOCO:   Contraction Frequency (minutes): 2-4, irritability  Contraction Duration (seconds): 60-90  Contraction Intensity: Mild    IMAGING:      TAUS   MAGGIE      - Q1 7.11 cm     - Q2 7.19 cm     - Q3 2.69 cm     - Q4 6.18 cm     - Total: 23.17 cm   Placenta: posterior   Presentation: vertex            Labs: No results found for this or any previous visit (from the past 24 hours).        Ct Beckett MD  6/25/2025  8:36 PM

## 2025-06-26 NOTE — PROCEDURES
Skyla Loving, a  at 37w4d with an MARII of 2025, by Ultrasound, was seen at [facility] for the following procedure(s): $Procedure Type: MAGGIE, NST]    Nonstress Test  Reason for NST: Routine  Variability: Moderate  Decelerations: None  Accelerations: Yes  Acoustic Stimulator: No  Baseline: 120 BPM  Uterine Irritability: Yes  Contractions: Irregular    4 Quadrant MAGGIE  MAGGIE Q1 (cm): 7.1 cm  MAGGIE Q2 (cm): 7.2 cm  MAGGIE Q3 (cm): 2.7 cm  MAGGIE Q4 (cm): 6.2 cm  MAGGIE TOTAL (cm): 23.2 cm        Interpretation  Nonstress Test Interpretation: Reactive  Overall Impression: Reassuring      Ct Beckett MD   PGY-2, OBGYN  2025  6:18 AM

## 2025-06-28 ENCOUNTER — HOSPITAL ENCOUNTER (INPATIENT)
Facility: HOSPITAL | Age: 30
LOS: 2 days | Discharge: HOME | End: 2025-06-30
Attending: OBSTETRICS & GYNECOLOGY | Admitting: OBSTETRICS & GYNECOLOGY
Payer: COMMERCIAL

## 2025-06-28 ENCOUNTER — ANESTHESIA (INPATIENT)
Dept: ANESTHESIOLOGY | Facility: HOSPITAL | Age: 30
End: 2025-06-28
Payer: COMMERCIAL

## 2025-06-28 ENCOUNTER — NURSE TRIAGE (OUTPATIENT)
Dept: OTHER | Facility: OTHER | Age: 30
End: 2025-06-28

## 2025-06-28 ENCOUNTER — ANESTHESIA EVENT (INPATIENT)
Dept: ANESTHESIOLOGY | Facility: HOSPITAL | Age: 30
End: 2025-06-28
Payer: COMMERCIAL

## 2025-06-28 DIAGNOSIS — Z3A.37 37 WEEKS GESTATION OF PREGNANCY: Primary | ICD-10-CM

## 2025-06-28 DIAGNOSIS — Z13.9 ENCOUNTER FOR SCREENING INVOLVING SOCIAL DETERMINANTS OF HEALTH (SDOH): ICD-10-CM

## 2025-06-28 PROBLEM — Z37.9 NORMAL LABOR: Status: ACTIVE | Noted: 2025-06-28

## 2025-06-28 LAB
ABO GROUP BLD: NORMAL
ALBUMIN SERPL BCG-MCNC: 3.3 G/DL (ref 3.5–5)
ALP SERPL-CCNC: 105 U/L (ref 34–104)
ALT SERPL W P-5'-P-CCNC: 8 U/L (ref 7–52)
ANION GAP SERPL CALCULATED.3IONS-SCNC: 9 MMOL/L (ref 4–13)
AST SERPL W P-5'-P-CCNC: 12 U/L (ref 13–39)
BASE EXCESS BLDCOA CALC-SCNC: -3.8 MMOL/L (ref 3–11)
BASE EXCESS BLDCOV CALC-SCNC: -0.7 MMOL/L (ref 1–9)
BILIRUB SERPL-MCNC: 0.33 MG/DL (ref 0.2–1)
BLD GP AB SCN SERPL QL: NEGATIVE
BUN SERPL-MCNC: 8 MG/DL (ref 5–25)
CALCIUM ALBUM COR SERPL-MCNC: 9.4 MG/DL (ref 8.3–10.1)
CALCIUM SERPL-MCNC: 8.8 MG/DL (ref 8.4–10.2)
CHLORIDE SERPL-SCNC: 107 MMOL/L (ref 96–108)
CO2 SERPL-SCNC: 21 MMOL/L (ref 21–32)
CREAT SERPL-MCNC: 0.47 MG/DL (ref 0.6–1.3)
ERYTHROCYTE [DISTWIDTH] IN BLOOD BY AUTOMATED COUNT: 17.9 % (ref 11.6–15.1)
GFR SERPL CREATININE-BSD FRML MDRD: 135 ML/MIN/1.73SQ M
GLUCOSE SERPL-MCNC: 105 MG/DL (ref 65–140)
HCO3 BLDCOA-SCNC: 25.2 MMOL/L (ref 17.3–27.3)
HCO3 BLDCOV-SCNC: 22.6 MMOL/L (ref 12.2–28.6)
HCT VFR BLD AUTO: 30.2 % (ref 34.8–46.1)
HGB BLD-MCNC: 9.4 G/DL (ref 11.5–15.4)
MCH RBC QN AUTO: 23.7 PG (ref 26.8–34.3)
MCHC RBC AUTO-ENTMCNC: 31.1 G/DL (ref 31.4–37.4)
MCV RBC AUTO: 76 FL (ref 82–98)
O2 CT VFR BLDCOA CALC: 3.5 ML/DL
OXYHGB MFR BLDCOA: 15.1 %
OXYHGB MFR BLDCOV: 92.6 %
PCO2 BLDCOA: 61.4 MM[HG] (ref 30–60)
PCO2 BLDCOV: 33.9 MM HG (ref 27–43)
PH BLDCOA: 7.23 [PH] (ref 7.23–7.43)
PH BLDCOV: 7.44 [PH] (ref 7.19–7.49)
PLATELET # BLD AUTO: 223 THOUSANDS/UL (ref 149–390)
PMV BLD AUTO: 10.1 FL (ref 8.9–12.7)
PO2 BLDCOA: 11.1 MM HG (ref 5–25)
PO2 BLDCOV: 48.3 MM HG (ref 15–45)
POTASSIUM SERPL-SCNC: 3.9 MMOL/L (ref 3.5–5.3)
PROT SERPL-MCNC: 6.1 G/DL (ref 6.4–8.4)
RBC # BLD AUTO: 3.97 MILLION/UL (ref 3.81–5.12)
RH BLD: POSITIVE
SAO2 % BLDCOV: 21.1 ML/DL
SODIUM SERPL-SCNC: 137 MMOL/L (ref 135–147)
SPECIMEN EXPIRATION DATE: NORMAL
WBC # BLD AUTO: 8.01 THOUSAND/UL (ref 4.31–10.16)

## 2025-06-28 PROCEDURE — 99211 OFF/OP EST MAY X REQ PHY/QHP: CPT

## 2025-06-28 PROCEDURE — 80053 COMPREHEN METABOLIC PANEL: CPT

## 2025-06-28 PROCEDURE — 59409 OBSTETRICAL CARE: CPT | Performed by: OBSTETRICS & GYNECOLOGY

## 2025-06-28 PROCEDURE — 86850 RBC ANTIBODY SCREEN: CPT

## 2025-06-28 PROCEDURE — 85027 COMPLETE CBC AUTOMATED: CPT

## 2025-06-28 PROCEDURE — 4A1HXCZ MONITORING OF PRODUCTS OF CONCEPTION, CARDIAC RATE, EXTERNAL APPROACH: ICD-10-PCS | Performed by: OBSTETRICS & GYNECOLOGY

## 2025-06-28 PROCEDURE — NC001 PR NO CHARGE: Performed by: OBSTETRICS & GYNECOLOGY

## 2025-06-28 PROCEDURE — 86900 BLOOD TYPING SEROLOGIC ABO: CPT

## 2025-06-28 PROCEDURE — 0HQ9XZZ REPAIR PERINEUM SKIN, EXTERNAL APPROACH: ICD-10-PCS | Performed by: OBSTETRICS & GYNECOLOGY

## 2025-06-28 PROCEDURE — 58300 INSERT INTRAUTERINE DEVICE: CPT | Performed by: OBSTETRICS & GYNECOLOGY

## 2025-06-28 PROCEDURE — 86780 TREPONEMA PALLIDUM: CPT

## 2025-06-28 PROCEDURE — 82805 BLOOD GASES W/O2 SATURATION: CPT | Performed by: OBSTETRICS & GYNECOLOGY

## 2025-06-28 PROCEDURE — 6A550ZT PHERESIS OF CORD BLOOD STEM CELLS, SINGLE: ICD-10-PCS | Performed by: OBSTETRICS & GYNECOLOGY

## 2025-06-28 PROCEDURE — 86901 BLOOD TYPING SEROLOGIC RH(D): CPT

## 2025-06-28 RX ORDER — BUPIVACAINE HYDROCHLORIDE 2.5 MG/ML
30 INJECTION, SOLUTION EPIDURAL; INFILTRATION; INTRACAUDAL; PERINEURAL ONCE AS NEEDED
Status: DISCONTINUED | OUTPATIENT
Start: 2025-06-28 | End: 2025-06-29

## 2025-06-28 RX ORDER — LIDOCAINE HYDROCHLORIDE AND EPINEPHRINE 15; 5 MG/ML; UG/ML
INJECTION, SOLUTION EPIDURAL
Status: COMPLETED | OUTPATIENT
Start: 2025-06-28 | End: 2025-06-28

## 2025-06-28 RX ORDER — OXYTOCIN/0.9 % SODIUM CHLORIDE 30/500 ML
PLASTIC BAG, INJECTION (ML) INTRAVENOUS
Status: COMPLETED
Start: 2025-06-28 | End: 2025-06-28

## 2025-06-28 RX ORDER — SODIUM CHLORIDE, SODIUM LACTATE, POTASSIUM CHLORIDE, CALCIUM CHLORIDE 600; 310; 30; 20 MG/100ML; MG/100ML; MG/100ML; MG/100ML
125 INJECTION, SOLUTION INTRAVENOUS CONTINUOUS
Status: DISCONTINUED | OUTPATIENT
Start: 2025-06-28 | End: 2025-06-29

## 2025-06-28 RX ORDER — ONDANSETRON 2 MG/ML
4 INJECTION INTRAMUSCULAR; INTRAVENOUS EVERY 6 HOURS PRN
Status: DISCONTINUED | OUTPATIENT
Start: 2025-06-28 | End: 2025-06-29

## 2025-06-28 RX ADMIN — Medication 250 MILLI-UNITS: at 23:37

## 2025-06-28 RX ADMIN — ROPIVACAINE HYDROCHLORIDE: 2 INJECTION, SOLUTION EPIDURAL; INFILTRATION at 20:12

## 2025-06-28 RX ADMIN — LIDOCAINE HYDROCHLORIDE AND EPINEPHRINE 3 ML: 15; 5 INJECTION, SOLUTION EPIDURAL; INFILTRATION; INTRACAUDAL; PERINEURAL at 19:53

## 2025-06-28 RX ADMIN — LEVONORGESTREL 1 INTRA UTERINE DEVICE: 52 INTRAUTERINE DEVICE INTRAUTERINE at 23:37

## 2025-06-28 RX ADMIN — SODIUM CHLORIDE, SODIUM LACTATE, POTASSIUM CHLORIDE, AND CALCIUM CHLORIDE 1000 ML: .6; .31; .03; .02 INJECTION, SOLUTION INTRAVENOUS at 18:39

## 2025-06-28 RX ADMIN — SODIUM CHLORIDE, SODIUM LACTATE, POTASSIUM CHLORIDE, AND CALCIUM CHLORIDE 999 ML/HR: .6; .31; .03; .02 INJECTION, SOLUTION INTRAVENOUS at 18:56

## 2025-06-28 NOTE — TELEPHONE ENCOUNTER
"Regardin weeks pregnant/contractions/fall  ----- Message from Daksha OAKLEY sent at 2025  3:32 PM EDT -----  \"I am 37 weeks pregnant and I am having contractions. I also had a minor fall\"    "

## 2025-06-28 NOTE — ASSESSMENT & PLAN NOTE
Admit to OB/GYN service  Follow up CBC, RPR, Blood Type  EFW: 7lbs by cali  VTX by transabdominal ultrasound   GBS neg status  Analgesia and/or epidural at patient request  Anticipate

## 2025-06-28 NOTE — ANESTHESIA PREPROCEDURE EVALUATION
Procedure:  LABOR ANALGESIA    Relevant Problems   ANESTHESIA (within normal limits)      CARDIO (within normal limits)      ENDO (within normal limits)      GI/HEPATIC (within normal limits)      /RENAL (within normal limits)      GYN   (+) 37 weeks gestation of pregnancy      HEMATOLOGY   (+) Iron deficiency anemia secondary to inadequate dietary iron intake      MUSCULOSKELETAL (within normal limits)      NEURO/PSYCH (within normal limits)        Physical Exam    Airway     Mallampati score: II  TM Distance: >3 FB  Neck ROM: full      Cardiovascular  Rhythm: regular, Rate: normalCardiovascular exam normal    Dental   No notable dental hx     Pulmonary  Pulmonary exam normal Breath sounds clear to auscultation    Neurological      Other Findings        Anesthesia Plan  ASA Score- 2     Anesthesia Type- epidural with ASA Monitors.         Additional Monitors:     Airway Plan:            Plan Factors-Exercise tolerance (METS): >4 METS.    Chart reviewed.   Existing labs reviewed. Patient summary reviewed.    Patient is a current smoker.              Induction-     Postoperative Plan-     Perioperative Resuscitation Plan - Level 1 - Full Code.       Informed Consent- Anesthetic plan and risks discussed with patient.

## 2025-06-28 NOTE — TELEPHONE ENCOUNTER
"REASON FOR CONVERSATION: Fall    SYMPTOMS: Fall from bed due to feet being asleep approx 2.5 hours ago.  Foot injury.    Also contractions since midnight, approx 10-15 minutes apart and lasting 30 seconds.    OTHER HEALTH INFORMATION:  37 w 6d    PROTOCOL DISPOSITION: Go to LD Now    CARE ADVICE PROVIDED: Advised to go to L &D.    PRACTICE FOLLOW-UP: N/A          Reason for Disposition   [1] Pregnant 20 or more weeks AND [2] fall to ground or floor (e.g., walking and tripped)    Answer Assessment - Initial Assessment Questions  1. MECHANISM: \"How did the fall happen?\"        Fell from bed due to feet being asleep    2. DOMESTIC VIOLENCE SCREENING: \"Did you fall because someone pushed you or tried to hurt you?\"        Denies    3. ONSET: \"When did the fall happen?\" (e.g., minutes, hours, or days ago)        2.5 hours ago    4. LOCATION: \"What part of the body hit the ground?\" (e.g., back, buttocks, head, hips, knees, hands, head, stomach)        Left side    5. INJURY: \"Did you get hurt when you fell? Are there any obvious injuries?\" If Yes, ask: \"What does the injury look like?\"        Left foot    6. PAIN: \"Is there any pain?\" If Yes, ask: \"How bad is the pain?\" (e.g., Scale 1-10; or mild,         5/10 contractions    7. SIZE: For cuts, bruises, or swelling, ask: \"How large is it?\" (e.g., inches or centimeters)        Denies    8. FETAL MOVEMENT: \"Has the baby's movement decreased or changed significantly from         + fetal movement    9. TETANUS: For any breaks in the skin, ask: \"When was the last tetanus booster?\"          N/A    10. OTHER SYMPTOMS: \"Do you have any other symptoms?\" (e.g., abdomen pain, contractions, leaking of fluid from vagina or concerned water broke, vaginal bleeding)          Contractions throughout night, since approx midnight  10-15 minutes lasting 30 seconds    Thicker discharge  Does not think water broke, no bleeding    Protocols used: Pregnancy - Fall-Adult-AH    "

## 2025-06-28 NOTE — H&P
H & P- Obstetrics   Skyla Loving 29 y.o. female MRN: [id number]  Unit/Bed#: -01 Encounter: [id number]      Assessment/Plan:    Skyla is a 29 y.o.  at 37w6d admitted for labor    SVE: Cervical Dilation: 5-6  Cervical Effacement: 60  Cervical Consistency: Soft  Fetal Station: -2  Presentation: Vertex  Method: Manual  OB Examiner: Nile    Assessment & Plan  Normal labor  Admit to OB/GYN service  Follow up CBC, RPR, Blood Type  EFW: 7lbs by cali  VTX by transabdominal ultrasound   GBS neg status  Analgesia and/or epidural at patient request  Anticipate     Iron deficiency anemia secondary to inadequate dietary iron intake  Admit hgb 9.4  Request for sterilization  MA- signed if C/S, Postplacental IUD if vaginal   Insufficient antepartum care  Did not know she was pregnant  37 weeks gestation of pregnancy            Patient of: [facility]  This patient will be an INPATIENT  and I certify the anticipated length of stay is >2 Midnights  Discussed with Dr. Lawton      SUBJECTIVE:    Chief Complaint: contractions     HPI: Skyla Loving is a 29 y.o.  with an MARII of 2025, by Ultrasound at 37w6d who is being admitted for labor . She complains of uterine contractions, occurring every 5 minutes, has no LOF, and reports no VB. She states she has felt good FM.. This pregnancy is complicated by the issues above. All other review of systems is negative.       Pregnancy Plan:  Pregnancy: Toussaint  Fetal sex: Male  Support person: Shasha Whelan     Delivery Plans  Planned delivery method: Vaginal  Planned delivery location: [facility] L&D  Planned anesthesia: Epidural  Acceptable blood products: All     Post-Delivery Plans  Feeding intentions: Commercial Milk Formula  Circumcision requested: Provider Performed  Cord blood plans: Do Not Collect  Planned adoption: Not Applicable  Planned birth control: I.U.D.      Problem List[1]    OB History    Para Term  AB Living    7 3 3 0 3 3   SAB IAB Ectopic Multiple Live Births   3 0 0 0 3      # Outcome Date GA Lbr Devyn/2nd Weight Sex Type Anes PTL Lv   7 Current            6 Term 11/23/22 39w2d 01:05 / 00:04 4065 g (8 lb 15.4 oz) F Vag-Spont EPI N CAROL   5 SAB 09/2022 14w0d   F SAB      4 SAB 05/2021 8w0d    SAB      3 SAB 02/2021           2 Term 09/10/19 41w1d / 01:48 3884 g (8 lb 9 oz) M Vag-Spont EPI N CAROL   1 Term 12/10/16 40w3d / 01:47 3780 g (8 lb 5.3 oz) F Vag-Spont EPI N CAROL       Past Medical History[2]    Past Surgical History[3]    Social History     Tobacco Use    Smoking status: Never    Smokeless tobacco: Current   Substance Use Topics    Alcohol use: Not Currently       Allergies[4]      Medications Prior to Admission:     acetaminophen (TYLENOL) 650 mg CR tablet    docusate sodium (COLACE) 100 mg capsule    hydrocortisone (ANUSOL-HC) 2.5 % rectal cream    sertraline (Zoloft) 25 mg tablet    MELATONIN-MAGNESIUM CITRATE PO    Melatonin-Pyridoxine ER (Melatonex) 3-10 MG TBCR        OBJECTIVE:  Vitals:  Temp:  [98.6 °F (37 °C)] 98.6 °F (37 °C)  HR:  [107] 107  Resp:  [17] 17  SpO2:  [98 %] 98 %  Body mass index is 33.11 kg/m².     Physical Exam:  General: Well appearing, no distress  Respiratory: Unlabored breathing  Cardiovascular: Regular rate.  Abdomen: Soft, gravid, nontender  Fundal Height: Appropriate for gestational age.  Extremities: Warm and well perfused.  Non tender.  Psychiatric: Behavioral normal        FHT:  Baseline Rate (FHR): 110 bpm  Variability: Moderate  Accelerations: 15 x 15 or greater  Decelerations: None    TOCO:   Contraction Frequency (minutes): 3-6  Contraction Duration (seconds): 30-60  Contraction Intensity: Mild      Prenatal Labs: Blood Type:   Lab Results   Component Value Date/Time    ABO Grouping O 06/28/2025 06:05 PM     , D (Rh type):   Lab Results   Component Value Date/Time    Rh Factor Positive 06/28/2025 06:05 PM     , Antibody Screen:   Lab Results   Component Value Date/Time     "Antibody Screen Negative 06/28/2025 06:05 PM    , HCT/HGB:   Lab Results   Component Value Date/Time    Hematocrit 30.2 (L) 06/28/2025 06:05 PM    Hemoglobin 9.4 (L) 06/28/2025 06:05 PM      , Platelets:   Lab Results   Component Value Date/Time    Platelets 223 06/28/2025 06:05 PM      , 1 hour Glucola: Did not perform  , Varicella: No results found for: \"VARICELLAIGG\"    , Rubella:   Lab Results   Component Value Date/Time    Rubella IgG Quant 25.4 06/04/2025 11:51 AM        , Syphilis: neg 2025     , Urine Culture/Screen:   Lab Results   Component Value Date/Time    Urine Culture >100,000 cfu/ml Lactobacillus species (A) 06/04/2025 11:51 AM    Urine Culture 10,000-19,000 cfu/ml 06/04/2025 11:51 AM       , Hep B:   Lab Results   Component Value Date/Time    Hepatitis B Surface Ag Non-reactive 06/04/2025 11:51 AM     , Hep C:   Lab Results   Component Value Date/Time    Hepatitis C Ab Non-reactive 06/04/2025 11:51 AM      , HIV: normal 6 2025    , Chlamydia:   Lab Results   Component Value Date/Time    Chlamydia trachomatis, DNA Probe Negative 06/04/2025 11:22 AM     , Gonorrhea:   Lab Results   Component Value Date/Time    N gonorrhoeae, DNA Probe Negative 06/04/2025 11:22 AM     , Group B Strep:    Lab Results   Component Value Date/Time    Strep Grp B PCR Negative 06/24/2025 10:35 AM      ,       Ángela Dowd,   6/28/2025  7:25 PM        Portions of the record may have been created with voice recognition software.  Occasional wrong word or \"sound a like\" substitutions may have occurred due to the inherent limitations of voice recognition software.  Read the chart carefully and recognize, using context, where substitutions have occurred         [1]   Patient Active Problem List  Diagnosis    Iron deficiency anemia secondary to inadequate dietary iron intake    Pelvic floor weakness in female    Request for sterilization    Insufficient antepartum care    37 weeks gestation of pregnancy    Uterine contractions    " Normal labor   [2]   Past Medical History:  Diagnosis Date    Anemia     Anxiety     Asthma     Depression     History of transfusion     Miscarriage 09/14/2021    Mononucleosis     Positive urine drug screen 3/28/2019    Has social work consult    Substance abuse (HCC)     medical marijuana    Urogenital trichomoniasis     UTI (urinary tract infection)     Varicella     immunized   [3] No past surgical history on file.  [4]   Allergies  Allergen Reactions    Pollen Extract Sneezing

## 2025-06-28 NOTE — PLAN OF CARE
Problem: PAIN - ADULT  Goal: Verbalizes/displays adequate comfort level or baseline comfort level  Description: Interventions:  - Encourage patient to monitor pain and request assistance  - Assess pain using appropriate pain scale  - Administer analgesics as ordered based on type and severity of pain and evaluate response  - Implement non-pharmacological measures as appropriate and evaluate response  - Consider cultural and social influences on pain and pain management  - Notify physician/advanced practitioner if interventions unsuccessful or patient reports new pain  - Educate patient/family on pain management process including their role and importance of  reporting pain   - Provide non-pharmacologic/complimentary pain relief interventions  Outcome: Progressing     Problem: INFECTION - ADULT  Goal: Absence or prevention of progression during hospitalization  Description: INTERVENTIONS:  - Assess and monitor for signs and symptoms of infection  - Monitor lab/diagnostic results  - Monitor all insertion sites, i.e. indwelling lines, tubes, and drains  - Monitor endotracheal if appropriate and nasal secretions for changes in amount and color  - [place] appropriate cooling/warming therapies per order  - Administer medications as ordered  - Instruct and encourage patient and family to use good hand hygiene technique  - Identify and instruct in appropriate isolation precautions for identified infection/condition  Outcome: Progressing  Goal: Absence of fever/infection during neutropenic period  Description: INTERVENTIONS:  - Monitor WBC  - Perform strict hand hygiene  - Limit to healthy visitors only  - No plants, dried, fresh or silk flowers with acuna in patient room  Outcome: Progressing     Problem: SAFETY ADULT  Goal: Patient will remain free of falls  Description: INTERVENTIONS:  - Educate patient/family on patient safety including physical limitations  - Instruct patient to call for assistance with activity   -  Consider consulting OT/PT to assist with strengthening/mobility based on AM PAC & JH-HLM score  - Consult OT/PT to assist with strengthening/mobility   - Keep Call bell within reach  - Keep bed low and locked with side rails adjusted as appropriate  - Keep care items and personal belongings within reach  - Initiate and maintain comfort rounds  - Make Fall Risk Sign visible to staff  - Apply yellow socks and bracelet for high fall risk patients  - Consider moving patient to room near nurses station  Outcome: Progressing  Goal: Maintain or return to baseline ADL function  Description: INTERVENTIONS:  -  Assess patient's ability to carry out ADLs; assess patient's baseline for ADL function and identify physical deficits which impact ability to perform ADLs (bathing, care of mouth/teeth, toileting, grooming, dressing, etc.)  - Assess/evaluate cause of self-care deficits   - Assess range of motion  - Assess patient's mobility; develop plan if impaired  - Assess patient's need for assistive devices and provide as appropriate  - Encourage maximum independence but intervene and supervise when necessary  - Involve family in performance of ADLs  - Assess for home care needs following discharge   - Consider OT consult to assist with ADL evaluation and planning for discharge  - Provide patient education as appropriate  - Monitor functional capacity and physical performance, use of AM PAC & JH-HLM   - Monitor gait, balance and fatigue with ambulation    Outcome: Progressing  Goal: Maintains/Returns to pre admission functional level  Description: INTERVENTIONS:  - Perform AM-PAC 6 Click Basic Mobility/ Daily Activity assessment daily.  - Set and communicate daily mobility goal to care team and patient/family/caregiver.   - Collaborate with rehabilitation services on mobility goals if consulted  - Out of bed for toileting  - Record patient progress and toleration of activity level   Outcome: Progressing     Problem: DISCHARGE  PLANNING  Goal: Discharge to home or other facility with appropriate resources  Description: INTERVENTIONS:  - Identify barriers to discharge w/patient and caregiver  - Arrange for needed discharge resources and transportation as appropriate  - Identify discharge learning needs (meds, wound care, etc.)  - Arrange for interpretive services to assist at discharge as needed  - Refer to Case Management Department for coordinating discharge planning if the patient needs post-hospital services based on physician/advanced practitioner order or complex needs related to functional status, cognitive ability, or social support system  Outcome: Progressing     Problem: Knowledge Deficit  Goal: Patient/family/caregiver demonstrates understanding of disease process, treatment plan, medications, and discharge instructions  Description: Complete learning assessment and assess knowledge base.  Interventions:  - Provide teaching at level of understanding  - Provide teaching via preferred learning methods  Outcome: Progressing     Problem: ANTEPARTUM  Goal: Maintain pregnancy as long as maternal and/or fetal condition is stable  Description: INTERVENTIONS:  - Maternal surveillance  - Fetal surveillance  - Monitor uterine activity  - Medications as ordered  - Bedrest  Outcome: Progressing     Problem: BIRTH - VAGINAL/ SECTION  Goal: Fetal and maternal status remain reassuring during the birth process  Description: INTERVENTIONS:  - Monitor vital signs  - Monitor fetal heart rate  - Monitor uterine activity  - Monitor labor progression (vaginal delivery)  - DVT prophylaxis  - Antibiotic prophylaxis  Outcome: Progressing  Goal: Emotionally satisfying birthing experience for mother/fetus  Description: Interventions:  - Assess, plan, implement and evaluate the nursing care given to the patient in labor  - Advocate the philosophy that each childbirth experience is a unique experience and support the family's chosen level of  involvement and control during the labor process   - Actively participate in both the patient's and family's teaching of the birth process  - Consider cultural, Adventist and age-specific factors and plan care for the patient in labor  Outcome: Progressing

## 2025-06-29 PROBLEM — R03.0 ELEVATED BLOOD PRESSURE READING WITHOUT DIAGNOSIS OF HYPERTENSION: Status: ACTIVE | Noted: 2025-06-29

## 2025-06-29 LAB — TREPONEMA PALLIDUM IGG+IGM AB [PRESENCE] IN SERUM OR PLASMA BY IMMUNOASSAY: NORMAL

## 2025-06-29 PROCEDURE — 99024 POSTOP FOLLOW-UP VISIT: CPT | Performed by: OBSTETRICS & GYNECOLOGY

## 2025-06-29 PROCEDURE — NC001 PR NO CHARGE: Performed by: STUDENT IN AN ORGANIZED HEALTH CARE EDUCATION/TRAINING PROGRAM

## 2025-06-29 RX ORDER — SIMETHICONE 80 MG
80 TABLET,CHEWABLE ORAL 4 TIMES DAILY PRN
Status: DISCONTINUED | OUTPATIENT
Start: 2025-06-29 | End: 2025-06-30 | Stop reason: HOSPADM

## 2025-06-29 RX ORDER — IBUPROFEN 600 MG/1
600 TABLET, FILM COATED ORAL EVERY 6 HOURS SCHEDULED
Status: DISCONTINUED | OUTPATIENT
Start: 2025-06-29 | End: 2025-06-30 | Stop reason: HOSPADM

## 2025-06-29 RX ORDER — SENNOSIDES 8.6 MG
1 TABLET ORAL DAILY
Status: DISCONTINUED | OUTPATIENT
Start: 2025-06-29 | End: 2025-06-30 | Stop reason: HOSPADM

## 2025-06-29 RX ORDER — DIPHENHYDRAMINE HCL 25 MG
25 TABLET ORAL EVERY 6 HOURS PRN
Status: DISCONTINUED | OUTPATIENT
Start: 2025-06-29 | End: 2025-06-30 | Stop reason: HOSPADM

## 2025-06-29 RX ORDER — CALCIUM CARBONATE 500 MG/1
1000 TABLET, CHEWABLE ORAL DAILY PRN
Status: DISCONTINUED | OUTPATIENT
Start: 2025-06-29 | End: 2025-06-30 | Stop reason: HOSPADM

## 2025-06-29 RX ORDER — ONDANSETRON 2 MG/ML
4 INJECTION INTRAMUSCULAR; INTRAVENOUS EVERY 8 HOURS PRN
Status: DISCONTINUED | OUTPATIENT
Start: 2025-06-29 | End: 2025-06-30 | Stop reason: HOSPADM

## 2025-06-29 RX ORDER — BENZOCAINE/MENTHOL 6 MG-10 MG
1 LOZENGE MUCOUS MEMBRANE DAILY PRN
Status: DISCONTINUED | OUTPATIENT
Start: 2025-06-29 | End: 2025-06-30 | Stop reason: HOSPADM

## 2025-06-29 RX ORDER — HYDROCORTISONE 25 MG/G
CREAM TOPICAL 2 TIMES DAILY
Status: DISCONTINUED | OUTPATIENT
Start: 2025-06-29 | End: 2025-06-30 | Stop reason: HOSPADM

## 2025-06-29 RX ORDER — ACETAMINOPHEN 325 MG/1
650 TABLET ORAL EVERY 6 HOURS SCHEDULED
Status: DISCONTINUED | OUTPATIENT
Start: 2025-06-29 | End: 2025-06-30 | Stop reason: HOSPADM

## 2025-06-29 RX ORDER — OXYTOCIN/0.9 % SODIUM CHLORIDE 30/500 ML
250 PLASTIC BAG, INJECTION (ML) INTRAVENOUS ONCE
Status: COMPLETED | OUTPATIENT
Start: 2025-06-29 | End: 2025-06-28

## 2025-06-29 RX ADMIN — BENZOCAINE AND LEVOMENTHOL 1 APPLICATION: 200; 5 SPRAY TOPICAL at 23:11

## 2025-06-29 RX ADMIN — IBUPROFEN 600 MG: 600 TABLET, FILM COATED ORAL at 23:01

## 2025-06-29 RX ADMIN — IBUPROFEN 600 MG: 600 TABLET, FILM COATED ORAL at 09:28

## 2025-06-29 RX ADMIN — WITCH HAZEL 1 PAD: 500 SOLUTION RECTAL; TOPICAL at 23:11

## 2025-06-29 RX ADMIN — ACETAMINOPHEN 650 MG: 325 TABLET ORAL at 09:28

## 2025-06-29 RX ADMIN — ACETAMINOPHEN 650 MG: 325 TABLET ORAL at 23:01

## 2025-06-29 RX ADMIN — IBUPROFEN 600 MG: 600 TABLET, FILM COATED ORAL at 01:33

## 2025-06-29 RX ADMIN — SENNOSIDES 8.6 MG: 8.6 TABLET, FILM COATED ORAL at 09:28

## 2025-06-29 RX ADMIN — SERTRALINE HYDROCHLORIDE 25 MG: 50 TABLET ORAL at 09:28

## 2025-06-29 RX ADMIN — ACETAMINOPHEN 650 MG: 325 TABLET ORAL at 03:43

## 2025-06-29 NOTE — L&D DELIVERY NOTE
Vaginal Delivery Summary - OB/GYN   Skyla Loving 29 y.o. female MRN: [id number]  Unit/Bed#: -01 Encounter: [id number]    Pre-delivery Diagnosis:   Pregnancy at 37w6d  Normal labor  Iron deficiency anemia   Insufficient prenatal care       Post-delivery Diagnosis: same, delivered    Procedure: Spontaneous Vaginal Delivery, repair of 1st degree laceration, insertion of postplacental mirena    Attending Physician: Dr. Lawton  Resident Physician: Ángela Dowd DO, PGY-II      Anesthesia: Epidural    QBL: 21cc    Complications: none apparent    Specimens:   1. Arterial and venous cord gases  2. Cord blood  3. Segment of umbilical cord  4. Placenta to storage     Findings:  1. Viable male on 2025 at 2333, with APGARS of 8 and 9 at 1 and 5 minutes respectively. Weight pending at time of dictation for skin to skin bonding.  2. Spontaneous delivery of intact placenta at 2337  3. 1 degree laceration repaired with 4-0 Vicryl     Gases:  Umbilical Artery  Recent Labs     25  2336   PHCART 7.231   BECART -3.8*       Umbilical Vein  Recent Labs     25  2336   PHCVEN 7.441   BECVEN -0.7*           Brief history and labor course:  Skyla Loving is a 29 y.o. [id number] 38w0d . She presented to labor and delivery for labor. Her pregnancy was complicated by BEV, insufficient prenatal care. On exam in triage she was noted to be 5.5/60/-2. She was admitted for labor. She received an epidural and progressed to active labor. She Amniotomy was performed  for  clear fluid. She progressed to complete and began pushing    Description of delivery:    Patient delivered a viable male , wt pending as mother is doing skin to skin bonding. The fetal vertex delivered HUSSEIN position spontaneously. There was a single nuchal cord which was reduced. The left anterior shoulder delivered atraumatically with maternal expulsive forces and the assistance of gentle downward traction. The right posterior shoulder  delivered with maternal expulsive forces and the assistance of gentle upward traction. The remainder of the fetus delivered spontaneously.        Upon delivery, the infant was placed on the mothers abdomen and the cord was doubly clamped and cut. Delayed cord clamping was achieved. The infant was noted to cry spontaneously and was moving all extremities appropriately. There was no evidence for injury. Awaiting nurse resuscitators evaluated the . Arterial and venous cord blood gases and cord blood was collected for analysis. These were promptly sent to the lab. In the immediate post-partum, active management of the 3rd stage of labor was performed with massage, the administration of 30 units of IV pitocin, and gentle traction on the umbilical cord. The placenta delivered spontaneously and was noted to have a centrally inserted 3 vessel cord.  The placenta was sent to storage.    The mirena IUD was grasped and inserted and palpated at the fundus of the uterus. Strings were trimmed near the introitus.    Laceration Repair  The vagina, cervix, perineum, and rectum were inspected and there was noted to be a first degree laceration, repaired using a 4-0 vicryl in a running subcuticular fashion.    At the conclusion of the procedure, all needle, sponge, and instrument counts were noted to be correct. Dr. Lawton was present and participated in all key portions of the case.    Disposition:  The patient and the  both tolerated the procedure well and are recovering in labor and delivery room       Ángela Dowd DO  Obstetrics and Gynecology PGY-II  2025  12:00 AM

## 2025-06-29 NOTE — DISCHARGE SUMMARY
Discharge Summary - OB/GYN   Name: Skyla Loving 29 y.o. female I MRN: [id number]  Unit/Bed#: -01 I Date of Admission: 2025   Date of Service: 2025 I Hospital Day: 1    ADMISSION  Patient of: [facility]  Admission Date: 2025   Admitting Attending: Dr. Yumiko Voss DO  Admitting Diagnoses: Problem List[1]    DELIVERY  Delivery Method: Vaginal, Spontaneous   Delivery Date and Time: 2025 11:33 PM  Delivery Attending: Yumiko Voss    DISCHARGE  Discharge Date: 25  Discharge Attending: Dr. Yumiko Voss DO  Discharge Diagnosis:   Same, Delivered    Clinical course: Admission to Delivery  Skyla Loving is a 29 y.o. [id number] 38w0d . She presented to labor and delivery for labor. Her pregnancy was complicated by BEV, insufficient prenatal care. On exam in triage she was noted to be 5.5/60/-2. She was admitted for labor. She received an epidural and progressed to active labor. She Amniotomy was performed  for  clear fluid. She progressed to complete and began pushing    Delivery  Route of Delivery: Vaginal, Spontaneous    Anesthesia: Epidural,   QBL: Non-Surgical QBL (mL): 21        Delivery: Vaginal, Spontaneous at 2025 11:33 PM    Apgar scores: 8  and 9  at 1 and 5 minutes, respectively    Clinical Course: Post-Delivery:  The post delivery course was unremarkable.    On the day of discharge, the patient was ambulating, voiding spontaneously, tolerating oral intake, and hemodynamically stable. She was able to reasonably perform all ADLs. She had appropriate bowel function. Pain was well-controlled. She was discharged home on postpartum/postop day #2 without complications. Patient was instructed to follow up with her OB as an outpatient and was given appropriate warnings to call her provider with problems or concerns.    Pertinent lab findings included:   Blood type O+.     Last three Hgb values:  Lab Results   Component Value Date    HGB 9.4 (L)  2025    HGB 9.3 (L) 2025    HGB 10.0 (L) 2023        Problem-specific follow-up plans included the following:  Assessment & Plan   (spontaneous vaginal delivery)  Routine postpartum care  Encourage ambulation  Encourage familial bonding  Lactation support as needed  Pain: Motrin/Tylenol around the clock, oxycodone if needed  Postpartum Contraceptive plan: postplacental mirena placed      Iron deficiency anemia secondary to inadequate dietary iron intake  Admit hgb 9.4  Insufficient antepartum care  Did not know she was pregnant  Elevated blood pressure reading without diagnosis of hypertension  (2025) 142/88  BP monitoring  Check for signs and symptoms of preeclampsia      Discharge med list:  Contraception: PP Mirena     Medication List      ASK your doctor about these medications     acetaminophen 650 mg CR tablet; Commonly known as: TYLENOL   docusate sodium 100 mg capsule; Commonly known as: COLACE; Take 1   capsule (100 mg total) by mouth 2 (two) times a day   hydrocortisone 2.5 % rectal cream; Commonly known as: ANUSOL-HC; Apply   topically 2 (two) times a day   Melatonex 3-10 MG Tbcr; Generic drug: Melatonin-Pyridoxine ER   MELATONIN-MAGNESIUM CITRATE PO   sertraline 25 mg tablet; Commonly known as: Zoloft; Take 1 tablet (25 mg   total) by mouth daily       Condition at discharge:   good     Disposition:   Home    Planned Readmission:   No         [1]   Patient Active Problem List  Diagnosis    Iron deficiency anemia secondary to inadequate dietary iron intake    Pelvic floor weakness in female    Request for sterilization    Insufficient antepartum care    37 weeks gestation of pregnancy    Uterine contractions    Normal labor

## 2025-06-29 NOTE — ANESTHESIA PROCEDURE NOTES
Epidural Block    Patient location during procedure: OB/L&D  Start time: 6/28/2025 7:51 PM  Reason for block: procedure for pain  Staffing  Performed by: Tanvir Blair MD  Authorized by: Tanvir Blair MD    Preanesthetic Checklist  Completed: patient identified, IV checked, site marked, risks and benefits discussed, surgical consent, monitors and equipment checked, pre-op evaluation and timeout performed  Epidural  Patient position: sitting  Prep: ChloraPrep  Sedation Level: no sedation  Patient monitoring: frequent blood pressure checks, continuous pulse oximetry and heart rate  Approach: midline  Location: lumbar, L3-4  Injection technique: HAROON saline  Needle  Needle type: Tuohy   Needle gauge: 18 G  Needle insertion depth: 5 cm  Catheter type: multi-orifice  Catheter size: 20 G  Catheter at skin depth: 11 cm  Catheter securement method: stabilization device and clear occlusive dressing  Test dose: negativelidocaine-epinephrine (XYLOCAINE-MPF/EPINEPHRINE) 1.5 %-1:200,000 injection 3 mL - Epidural   3 mL - 6/28/2025 7:53:00 PM  Assessment  Sensory level: T10  Number of attempts: 1negative aspiration for CSF, negative aspiration for heme and no paresthesia on injection  patient tolerated the procedure well with no immediate complications

## 2025-06-29 NOTE — ASSESSMENT & PLAN NOTE
Routine postpartum care  Encourage ambulation  Encourage familial bonding  Lactation support as needed  Pain: Motrin/Tylenol around the clock, oxycodone if needed  Postpartum Contraceptive plan: postplacental mirena placed

## 2025-06-29 NOTE — CASE MANAGEMENT
Case Management Mom/Baby/NICU Assessment   & Discharge Planning Note    Patient name Skyla Loving  Location /-01 MRN [id number]  : 1995 Date 2025       Current Admission Date: 2025  Current Admission Diagnosis: (spontaneous vaginal delivery)   Patient Active Problem List    Diagnosis Date Noted     (spontaneous vaginal delivery) 2025    Uterine contractions 2025    37 weeks gestation of pregnancy 2025    Request for sterilization 2025    Insufficient antepartum care 2025    Pelvic floor weakness in female 2023    Iron deficiency anemia secondary to inadequate dietary iron intake 2022      LOS (days): 1  Geometric Mean LOS (GMLOS) (days):   Days to GMLOS:   OBJECTIVE:    Risk of Unplanned Readmission Score: 5.04         Current admission status: Inpatient     Preferred Pharmacy:   [id number] - [address] [address]  Phone: [phone] Fax: [phone]    Primary Care Provider: No primary care provider on file.    Primary Insurance: [employer]  Secondary Insurance:     ASSESSMENT:  Mom/Baby/NICU Assessment:  Assessment completed with: MOB  Mental Status: Alert  Reason for consult: Social Issues  Mother of the Baby’s (MOB) name: Skyla Loving  Age of Mom: 29  Father of the Baby’s (FOB) name: Tip Carl  Alternate emergency contact (name/phone/relationship): N/A  Baby’s name: Zana Carl  Baby’s gender: Male  Medical insurance provider for Baby: Other ([facility] [place], [place])  Plan and interventions to secure baby’s insurance ( that baby is covered for the first 30 days under MOB; footprint card = ID/proof of life until SSN card received): MOB verbalizes that they will contact their insurance to add baby ASAP.  Other children (of MOB and/or FOB; include gender and ages): 3 other children aged 8,5,2  Household members/MOB lives with:  S.O./partner, Other child/children  Will this be the living arrangements for you and baby after discharge?: Yes  Support system: Family, Friends  Are you currently working with any community programs (such as [facility] [place]?): No  Are you interested in a referral for a parenting education/community support agency?: No  Do you have all the necessary baby supplies?: Yes  Method of feeding: Bottle/Formula  Does MOB have a breast pump?: No, not needed  Government assistance programs currently accessing: SNAP/Food stamps  Government assistance programs needed/resource information provided: WIC (Has an appointment scheduled with [place])  Childcare arrangements planned: MOB  MOB’s Employment/Schooling: Unemployed  FOB’s Employment/Schooling: Work FT  Does patient have a history of substance use disorder?: No  Urine Drug Screen results for MOB: N/A  Urine Drug Screen results for Baby: Pending  Was a referral placed to Childline?: No  Children & Youth (CYS) or Department of Child Protection & Permanency (DCP&P) Involvement: None  Does patient have a mental health diagnosis?: No  Current legal concerns: None  Domestic/Intimate Partner Violence History: No  Typical means of transportation used: Family/friend  Durable medical equipment (DME) needed:: None    DISCHARGE DETAILS:  Discharge Planning:  [facility]: [facility]  Pediatrician planned: TBD, reviewing options  Discharge Transportation Plan: Family vehicle  NICU Resources: N/A  Discharge disposition for baby: Home with parent(s)/family    CM consulted for SDOH.  CM met with MOB, FOB sleeping at bedside.  BENITO denies concerns with utilities, housing, or food insecurity.  BENITO receives SNAP and has an appointment for [place].  MOB and FOB do not drive, family and friends assist with transportation and will drive them home at time of DC.  CM provided transportation resources should they be needed in the future.   BENITO reports limited PNC due to not knowing she  was pregnant.  MOB denies further needs at this time, CM department remains available.

## 2025-06-29 NOTE — PROGRESS NOTES
Progress Note - OB/GYN   Name: Skyla Loving 29 y.o. female I MRN: [id number]  Unit/Bed#: -01 I Date of Admission: 2025   Date of Service: 2025 I Hospital Day: 1     Assessment & Plan   (spontaneous vaginal delivery)  Routine postpartum care  Encourage ambulation  Encourage familial bonding  Lactation support as needed  Pain: Motrin/Tylenol around the clock, oxycodone if needed  Postpartum Contraceptive plan: postplacental mirena placed      Iron deficiency anemia secondary to inadequate dietary iron intake  Admit hgb 9.4  Request for sterilization  MA- signed if C/S, Postplacental IUD if vaginal   Insufficient antepartum care  Did not know she was pregnant  37 weeks gestation of pregnancy      OB L&D Progress Note  Subjective   She is feeling well and has no complaints. She is voiding, passing gas, lochia wnl.     Objective :  Temp:  [98.6 °F (37 °C)] 98.6 °F (37 °C)  HR:  [] 111  BP: (111-134)/(55-81) 122/76  Resp:  [17] 17  SpO2:  [96 %-98 %] 96 %    Physical Exam:   GEN: appears well, alert and oriented x 3, pleasant and cooperative   CV: Regular rate  RESP: non labored breathing  ABDOMEN: soft, no tenderness, no distention, Uterine fundus firm and non-tender, -2 cm below the umbilicus  EXTREMITIES: non-tender  NEURO Alert and oriented to person, place, and time.         Lab Results: I have reviewed the following results:    Ángela Dowd, DO   Obstetrics & Gynecology PGY-II  25  5:17 AM

## 2025-06-29 NOTE — OB LABOR/OXYTOCIN SAFETY PROGRESS
Labor Progress Note - Skyla Loving 29 y.o. female MRN: [id number]    Unit/Bed#: -01 Encounter: [id number]       Contraction Frequency (minutes): 3-4  Contraction Intensity: Mild  Uterine Activity Characteristics: Irregular  Cervical Dilation: 10  Dilation Complete Date: 06/28/25  Dilation Complete Time: 2323  Cervical Effacement: 100  Fetal Station: 1  Baseline Rate (FHR): 115 bpm  Fetal Heart Rate (FHT): 120 BPM  FHR Category: cat1                Vital Signs:   Vitals:    06/28/25 2253   BP: 124/81   Pulse: 94   Resp:    Temp:    SpO2:        Notes/comments:     SVE as a above. Will begin pushing    Ángela DO [place] 6/28/2025 11:24 PM

## 2025-06-29 NOTE — OB LABOR/OXYTOCIN SAFETY PROGRESS
Labor Progress Note - Skyla Loving 29 y.o. female MRN: [id number]    Unit/Bed#: -01 Encounter: [id number]       Contraction Frequency (minutes): 3-6  Contraction Intensity: Mild  Uterine Activity Characteristics: Irregular  Cervical Dilation: 7-8        Cervical Effacement: 90  Fetal Station: 0  Baseline Rate (FHR): 110 bpm  Fetal Heart Rate (FHT): 120 BPM  FHR Category: cat 1               Vital Signs:   Vitals:    06/28/25 2007   BP: 111/55   Pulse: 94   Resp:    Temp:    SpO2:        Notes/comments:     Patient comfortable with her epidural. SVE in active labor. Segun Dowd DO 6/28/2025 8:29 PM

## 2025-06-29 NOTE — OB LABOR/OXYTOCIN SAFETY PROGRESS
Labor Progress Note - Skyla Loving 29 y.o. female MRN: [id number]    Unit/Bed#: -01 Encounter: [id number]       Contraction Frequency (minutes): 5-7  Contraction Intensity: Mild  Uterine Activity Characteristics: Irregular  Cervical Dilation: 7-8        Cervical Effacement: 90  Fetal Station: 0  Baseline Rate (FHR): 110 bpm  Fetal Heart Rate (FHT): 120 BPM  FHR Category: cat 1               Vital Signs:   Vitals:    06/28/25 2052   BP: 120/75   Pulse: (!) 110   Resp:    Temp:    SpO2:        Notes/comments:     SVE as above. Continue position changes in sidelying release    Ángela Dowd DO 6/28/2025 10:02 PM

## 2025-06-29 NOTE — ANESTHESIA POSTPROCEDURE EVALUATION
Post-Op Assessment Note    CV Status:  Stable    Pain management: adequate      Post-op block assessment: catheter intact   Mental Status:  Alert and awake   Hydration Status:  Euvolemic   PONV Controlled:  Controlled   Airway Patency:  Patent     Post Op Vitals Reviewed: Yes    No anethesia notable event occurred.    Staff: Anesthesiologist           Last Filed PACU Vitals:  Vitals Value Taken Time   Temp     Pulse 90 06/29/25 00:37   /57 06/29/25 00:37   Resp     SpO2     Vitals shown include unfiled device data.

## 2025-06-30 ENCOUNTER — HOSPITAL ENCOUNTER (OUTPATIENT)
Dept: INFUSION CENTER | Facility: HOSPITAL | Age: 30
Discharge: HOME | End: 2025-06-30
Attending: OBSTETRICS & GYNECOLOGY

## 2025-06-30 ENCOUNTER — TELEPHONE (OUTPATIENT)
Dept: OBGYN CLINIC | Facility: MEDICAL CENTER | Age: 30
End: 2025-06-30

## 2025-06-30 VITALS
SYSTOLIC BLOOD PRESSURE: 115 MMHG | HEIGHT: 62 IN | HEART RATE: 64 BPM | BODY MASS INDEX: 33.11 KG/M2 | DIASTOLIC BLOOD PRESSURE: 64 MMHG | OXYGEN SATURATION: 97 % | RESPIRATION RATE: 18 BRPM | TEMPERATURE: 97.8 F

## 2025-06-30 PROCEDURE — 99024 POSTOP FOLLOW-UP VISIT: CPT | Performed by: STUDENT IN AN ORGANIZED HEALTH CARE EDUCATION/TRAINING PROGRAM

## 2025-06-30 RX ORDER — BENZOCAINE/MENTHOL 6 MG-10 MG
1 LOZENGE MUCOUS MEMBRANE DAILY PRN
Start: 2025-06-30

## 2025-06-30 RX ADMIN — IBUPROFEN 600 MG: 600 TABLET, FILM COATED ORAL at 08:06

## 2025-06-30 RX ADMIN — SENNOSIDES 8.6 MG: 8.6 TABLET, FILM COATED ORAL at 08:46

## 2025-06-30 RX ADMIN — SERTRALINE HYDROCHLORIDE 25 MG: 50 TABLET ORAL at 08:46

## 2025-06-30 RX ADMIN — ACETAMINOPHEN 650 MG: 325 TABLET ORAL at 08:05

## 2025-06-30 NOTE — PLAN OF CARE
Problem: PAIN - ADULT  Goal: Verbalizes/displays adequate comfort level or baseline comfort level  Description: Interventions:  - Encourage patient to monitor pain and request assistance  - Assess pain using appropriate pain scale  - Administer analgesics as ordered based on type and severity of pain and evaluate response  - Implement non-pharmacological measures as appropriate and evaluate response  - Consider cultural and social influences on pain and pain management  - Notify physician/advanced practitioner if interventions unsuccessful or patient reports new pain  - Educate patient/family on pain management process including their role and importance of  reporting pain   - Provide non-pharmacologic/complimentary pain relief interventions  Outcome: Progressing     Problem: INFECTION - ADULT  Goal: Absence or prevention of progression during hospitalization  Description: INTERVENTIONS:  - Assess and monitor for signs and symptoms of infection  - Monitor lab/diagnostic results  - Monitor all insertion sites, i.e. indwelling lines, tubes, and drains  - Monitor endotracheal if appropriate and nasal secretions for changes in amount and color  - Gordon appropriate cooling/warming therapies per order  - Administer medications as ordered  - Instruct and encourage patient and family to use good hand hygiene technique  - Identify and instruct in appropriate isolation precautions for identified infection/condition  Outcome: Progressing  Goal: Absence of fever/infection during neutropenic period  Description: INTERVENTIONS:  - Monitor WBC  - Perform strict hand hygiene  - Limit to healthy visitors only  - No plants, dried, fresh or silk flowers with acuna in patient room  Outcome: Progressing     Problem: SAFETY ADULT  Goal: Patient will remain free of falls  Description: INTERVENTIONS:  - Educate patient/family on patient safety including physical limitations  - Instruct patient to call for assistance with activity   -  Consider consulting OT/PT to assist with strengthening/mobility based on AM PAC & JH-HLM score  - Consult OT/PT to assist with strengthening/mobility   - Keep Call bell within reach  - Keep bed low and locked with side rails adjusted as appropriate  - Keep care items and personal belongings within reach  - Initiate and maintain comfort rounds  - Make Fall Risk Sign visible to staff  - Offer Toileting every    Hours, in advance of need  - Initiate/Maintain   alarm  - Obtain necessary fall risk management equipment:     - Apply yellow socks and bracelet for high fall risk patients  - Consider moving patient to room near nurses station  Outcome: Progressing  Goal: Maintain or return to baseline ADL function  Description: INTERVENTIONS:  -  Assess patient's ability to carry out ADLs; assess patient's baseline for ADL function and identify physical deficits which impact ability to perform ADLs (bathing, care of mouth/teeth, toileting, grooming, dressing, etc.)  - Assess/evaluate cause of self-care deficits   - Assess range of motion  - Assess patient's mobility; develop plan if impaired  - Assess patient's need for assistive devices and provide as appropriate  - Encourage maximum independence but intervene and supervise when necessary  - Involve family in performance of ADLs  - Assess for home care needs following discharge   - Consider OT consult to assist with ADL evaluation and planning for discharge  - Provide patient education as appropriate  - Monitor functional capacity and physical performance, use of AM PAC & JH-HLM   - Monitor gait, balance and fatigue with ambulation    Outcome: Progressing  Goal: Maintains/Returns to pre admission functional level  Description: INTERVENTIONS:  - Perform AM-PAC 6 Click Basic Mobility/ Daily Activity assessment daily.  - Set and communicate daily mobility goal to care team and patient/family/caregiver.   - Collaborate with rehabilitation services on mobility goals if  consulted  - Perform Range of Motion    times a day.  - Reposition patient every    hours.  - Dangle patient    times a day  - Stand patient    times a day  - Ambulate patient      times a day  - Out of bed to chair    times a day   - Out of bed for meals    times a day  - Out of bed for toileting  - Record patient progress and toleration of activity level   Outcome: Progressing     Problem: DISCHARGE PLANNING  Goal: Discharge to home or other facility with appropriate resources  Description: INTERVENTIONS:  - Identify barriers to discharge w/patient and caregiver  - Arrange for needed discharge resources and transportation as appropriate  - Identify discharge learning needs (meds, wound care, etc.)  - Arrange for interpretive services to assist at discharge as needed  - Refer to Case Management Department for coordinating discharge planning if the patient needs post-hospital services based on physician/advanced practitioner order or complex needs related to functional status, cognitive ability, or social support system  Outcome: Progressing     Problem: Knowledge Deficit  Goal: Patient/family/caregiver demonstrates understanding of disease process, treatment plan, medications, and discharge instructions  Description: Complete learning assessment and assess knowledge base.  Interventions:  - Provide teaching at level of understanding  - Provide teaching via preferred learning methods  Outcome: Progressing

## 2025-06-30 NOTE — TELEPHONE ENCOUNTER
Who called:STAFF     Is the patient Pregnant ? No  If so, How many weeks?     Reason for the Call: Patient is going home today  after  Delivery needs [facility] appt    Action Taken: Mail box was full, will send a [employer] message    Outcome/Plan/ Recommendations: Patient is going home today  after  Delivery needs [facility] appt

## 2025-06-30 NOTE — PROGRESS NOTES
Reviewed discharge instructions with pt. Verbalized understanding. Pt has save your life magnet. Reviewed, verbalized understanding.

## 2025-06-30 NOTE — DISCHARGE INSTR - AVS FIRST PAGE
Self Care After Delivery   Postpartum Care Resources  [facility] Baby & Me Support Center:  Baby and Me is a support center designed to promote the physical and mental health of families. [facility] Baby & Me Support [facility] delivers personalized and compassionate care that includes prenatal and  classes, Support Groups, Lactation Support Services, Post-Partum Emotional Wellness Services, Activities/Exercise    [facility] [place] [address]  [phone]  [email or web address]    [facility]  Physical Therapy at [facility]’s team of trained female therapists offer a wide variety of services designed to address the special healthcare needs of women. Our specially trained clinicians work with you to address your concerns and come beside you to support and encourage you through the healing process. Our offices are designed to keep your sensitive treatments private at all times. We are here to ensure your comfort and mentor you through our pelvic floor therapy programs at your pace.  [email or web address]    The postpartum period  is the period of time from delivery to about 6 weeks. During this time you may experience many physical and emotional changes. It is important to understand what is normal and when you need to call your healthcare provider. It is also important to know how to care for yourself during this time.  Call your local emergency number (911 in the [place]) for any of the following:   You see or hear things that are not there, or have thoughts of harming yourself.  You soak through 1 pad in 15 minutes, have blurry vision, clammy or pale skin, and feel faint.  You faint or lose consciousness.  You have trouble breathing.  You cough up blood.    Seek care immediately if:   Your heart is beating faster than usual.  You have a  bad headache or changes in your vision.  Your episiotomy or  incision is red, swollen, bleeding, or draining pus.  You have severe abdominal pain.    Call your doctor or obstetrician if:   Your leg is painful, red, and larger than usual.  You soak through 1 or more pads in an hour, or pass blood clots larger than a quarter from your vagina.  You have a fever.  You have new or worsening pain in your abdomen or vagina.  You continue to have depression 1 to 2 weeks after you deliver.  You have trouble sleeping.  You have foul-smelling discharge from your vagina.  You have pain or burning when you urinate.  You do not have a bowel movement for 3 days or more.  You have nausea or are vomiting.  You have hard lumps or red streaks over your breasts.  You have cracked nipples or bleed from your nipples.  You have questions or concerns about your condition or care.    Physical changes:  The following are normal changes after you give birth:  Pain in the area between your anus and vagina  Breast pain  Constipation or hemorrhoids  Hot or cold flashes  Vaginal bleeding or discharge  Mild to moderate abdominal cramping  Difficulty controlling bowel movements or urine    Emotional changes:  A drop in hormone levels after you deliver may cause changes in your emotions. You may feel irritable, sad, or anxious. You may cry easily or for no reason. You may also feel depressed. Depression that continues can be a sign of postpartum depression, a condition that can be treated. Treatment may include talk therapy, medicines, or both. Healthcare providers will ask how you are feeling and if you have any depression. These talks can happen during appointments for your medical care. Providers can help you find ways to care for yourself. Talk to your providers about the following:  When emotional changes or depression started, and if it is getting worse over time  Problems you are having with daily activities or sleep  If anything  makes you feel worse, or makes you feel better  Support you have from friends, family, or others    Breast care for non-breastfeeding mothers:  Milk will fill your breasts naturally. Do the following to help stop your milk from filling your breasts and causing pain:  Wear a bra with support at all times.  A sports bra or a tight-fitting bra will help stop your milk from coming in.  Apply ice on each breast for 15 to 20 minutes every hour or as directed.  Use an ice pack, or put crushed ice in a plastic bag. Cover it with a towel before you apply it to your breast. Ice helps your milk ducts shrink.  Keep your breasts away from warm water.  Warm water will make it easier for milk to fill your breasts. Stand with your breasts away from warm water in the shower.  Limit how much you touch your breasts.  This will prevent them from filling with milk.    Perineum care:  Your perineum is the area between your rectum and vagina. It is normal to have swelling and pain in this area after you give birth. If you had an episiotomy, your healthcare provider may give you special instructions.  Clean your perineum after you use the bathroom.  This may prevent infection and help with healing. Use a spray bottle with warm water to clean your perineum. You may also gently spray warm water against your perineum when you urinate. Always wipe front to back.  Take a sitz bath as directed.  A sitz bath may help relieve swelling and pain. Fill your bath tub or bucket with water up to your hips and sit in the water. Use cold water for 2 days after you deliver. Then use warm water. Ask your healthcare provider for more information about a sitz bath.  Apply ice packs for the first 24 hours or as directed.  Use a plastic glove filled with ice or buy an ice pack. Wrap the ice pack or plastic glove in a small towel or wash cloth. Place the ice pack on your perineum for 20 minutes at a time.  Sit on a donut-shaped pillow.  This may relieve pressure  on your perineum when you sit.  Use wipes that contain medicine or take pills as directed.  Your healthcare provider may tell you to use witch hazel pads. You can place witch hazel pads in the refrigerator before you apply them to your perineum. Your provider may also tell you to take NSAIDs. Ask him or her how often to take pills or use the wipes.  Do not go swimming or take tub baths for 4 to 6 weeks or as directed.  This will help prevent an infection in your vagina or uterus.    Bowel and bladder care:  It may take 3 to 5 days to have a bowel movement after your delivery. You can do the following to prevent or manage constipation, and get control of your bowel or bladder:  Take stool softeners as directed.  A stool softener is medicine that will make your bowel movements softer. This may prevent or relieve constipation. A stool softener may also make bowel movements less painful.  Drink plenty of liquids.  Ask how much liquid to drink each day and which liquids are best for you. Liquids may help prevent constipation.  Eat foods high in fiber.  Examples include fruits, vegetables, grains, beans, and lentils. Ask your healthcare provider how much fiber you need each day. Fiber may prevent constipation.         Do Kegel exercises as directed.  Kegel exercises will help strengthen the muscles that control bowel movements and urination. Ask your healthcare provider for more information on Kegel exercises.  Apply cold compresses or medicine to hemorrhoids as directed.  This may relieve swelling and pain. Your healthcare provider may tell you to apply ice or wipes that contain medicine to your hemorrhoids. He or she may also tell you to use a sitz bath. Ask your provider for more information on how to manage hemorrhoids.    Nutrition:  Good nutrition is important in the postpartum period. It will help you return to a healthy weight, increase your energy levels, and prevent constipation. It will also help you get enough  nutrients and calories if you are going to breastfeed.  Eat a variety of healthy foods.  Healthy foods include fruits, vegetables, whole-grain breads, low-fat dairy products, beans, lean meats, and fish. You may need 500 to 700 extra calories each day if you breastfeed. You may also need extra protein.         Limit foods with added sugar and high amounts of fat.  These foods are high in calories and low in healthy nutrients. Read food labels so you know how much sugar and fat is in the food you want to eat.  Drink 8 to 10 glasses of water per day.  Water will help you make plenty of milk. It will also help prevent constipation. Drink a glass of water regularly.  Take vitamins as directed.  Ask your healthcare provider what vitamins you need.  Limit caffeine and alcohol if you are breastfeeding.  Caffeine and alcohol can get into your breast milk. Ask your healthcare provider if you can drink alcohol or caffeine.    Rest and sleep:  You may feel very tired in the postpartum period. Enough sleep will help you heal. The following may help you get sleep and rest:  Nap when you can.  Get rest during this time.  Limit visitors.  Many people may want to see you. Ask friends or family to visit on different days. This will give you time to rest.  Do not plan too much for one day.  Put off household chores so that you have time to rest. Gradually do more each day.  Ask for help from family, friends, or neighbors.  Ask them to help you with laundry, cleaning, or errands.     Exercise after delivery:  Wait until your healthcare provider says it is okay to exercise. Exercise can help you lose weight, increase your energy levels, and manage your mood. It can also prevent constipation and blood clots. Start with gentle exercises such as walking. Do more as you have more energy. You may need to avoid abdominal exercises for 1 to 2 weeks after you deliver. Talk to your healthcare provider about an exercise plan that is right for  you.       Sexual activity after delivery:   Do not have sex until your healthcare provider says it is okay. You may need to wait 4 to 6 weeks before you have sex. This may prevent infection and allow time to heal.  Your menstrual cycle may begin as soon as 3 weeks after you deliver. Your period may be delayed if you breastfeed. You can become pregnant before you get your first postpartum period. Talk to your healthcare provider about birth control that is right for you. Some types of birth control are not safe during breastfeeding.    For support and more information:  Join a support group for new mothers. Ask for help from family and friends with chores and errands.  Office of Women's Health,  Department of Health and Human Services  [address] [place] [address]  [address] [place] [address]  Phone: [phone]  Web Address: www.womenshealth.gov    [facility] Postpartum Care  [address] [place] [address]  Web Address: [email or web address]    Follow up with your doctor or obstetrician as directed:  You will need to follow up within 2 to 6 weeks of delivery. Write down your questions so you remember to ask them at your visits.    © Copyright [employer] 2022 Information is for End User's use only and may not be sold, redistributed or otherwise used for commercial purposes. All illustrations and images included in CareNotes® are the copyrighted property of [employer] or [employer]  The above information is an  only. It is not intended as medical advice for individual conditions or treatments. Talk to your doctor, nurse or pharmacist before following any medical regimen to see if it is safe and effective for you.      Breastfeeding   Benefits of breastfeeding  Are less likely to develop allergies  Have  increased protection against bacterial meningitis  Have fewer middle ear infections  Have fewer learning disabilities  Have fewer behavioral difficulties  Have higher IQ's  Have lower rates of respiratory illness  Have lower obesity  Are less likely to develop juvenile diabetes and some childhood cancers  Are less likely to die from Sudden Infant Death Syndrome  A healthier baby means fewer visits to the doctor and the pharmacy.  Breastfeeding is environmentally friendly. There is nothing to throw away.   Breastfeeding is free; formula can cost over $1000 for the first year of life.   There is less vaginal bleeding immediately after delivery and a faster return to your pre-pregnant size.  Mothers who breastfeed a lifetime total of 2 years have:  A 40% decreased risk of breast cancer   A decreased risk of ovarian cancer  Lower rates of osteoporosis, diabetes and heart disease.    Importance of exclusive breastfeeding  By providing the ideal food for the healthy growth and development of infants, exclusive  infants receive only breast milk.   Exclusive breastfeeding for the first 6 months is very important to improve an infant's health and reduce childhood illness.     Exclusive breastfeeding for the first 6 months  The American Academy of Pediatrics recommends exclusive breastfeeding for the first six months and continued breastfeeding with supplementation of solids for the next 6 months.     Early initiation of breastfeeding  Women who feed their infants within the first hour of birth is referred to as “early initiation of breastfeeding” and ensures that the  receives colostrum.   Colostrum is rich in antibodies and essential nutrients.     Rooming-In  May stabilize 's breathing and heart rate  Reduce crying  Improve ability for  to maintain temperature and blood sugar levels  Early stimulation of baby's immune system  Calming effects  Easier and faster bonding   Rooming-in promotes  getting to know your .  Rooming-in makes breastfeeding easier.  Women who room-in with their newborns make more milk and produce a good milk supply earlier.  Becomes easier to recognize baby's feeding cues  Infants have longer breastfeeding sessions.  Women who room-in with their newborns are more likely to exclusively breastfeed compared to women who are  from their newborns.   Skin-to-Skin  Skin to skin contact should happen regardless of a woman's feeding preference or the mode of delivery.  After the delivery of your baby, it's important that a healthy mother and her baby have uninterrupted skin-to-skin contact.  Skin to skin contact should occur immediately after birth for a least one-two hours and until after the first feeding for breastfeeding mothers.  Skin-to-skin contact means placing dried, unclothes newborns on their mother's bare chest, with warm blankets covering the baby's back.  All routine procedures such as maternal and  assessments can take place during skin-to-skin contact or can be delayed until after the sensitive period immediately after birth.     Postpartum Birth Control Options   Almost half of all pregnancies are not planned, which can sometimes be a happy surprise, but other times can be a stressful situation for a woman or family. Birth control can be an empowering tool for women to take control of their family planning so that they can have healthy pregnancies in the future if and when they want. We recommend at least 6 months, ideally 18 months, between delivery to conception of the next pregnancy.     Implant  The birth control implant, brand name Nexplanon, is a small matchstick sized device that is placed under the skin of your upper arm. This releases a small amount of hormone daily that prevents pregnancy for up to 3 years of use. You can have the implant inserted after delivery prior to being discharged from the hospital or at any time in your OBGYN office  depending on insurance coverage.  Pros  - Less than 1 out of 100 women will get pregnant in 1 year using this method  - Once it is placed you do not have to do anything else to prevent pregnancy, like remembering to take a daily pill   - Once removed, the implant is immediately reversible and you will return to your normal ability to get pregnant  - While there is a theoretical risk of low milk supply when these methods are started right after birth, there is no research to support this theory. The implant is considered safe for use in breastfeeding mothers  Cons  - Most common side effects of the implant include changes in your period- which can be heavier, lighter, spotting in between periods or stopping your periods all together- it depends on the person  - Other side effects include headaches and acne    Intrauterine Device (IUD)  An intrauterine device (IUD) is a small T shaped device that is placed into the uterus. The IUD comes in 2 forms: hormonal and non-hormonal  - The non-hormonal IUD or copper IUD has been approved up to 10 years of use   - The hormonal IUD comes in forms that are approved for 3-6 years of use    This device can be placed immediately following delivery, 4 weeks after delivery or any time following that in your OBGYN office depending on insurance coverage.  Pros  - Less than 1 out of 100 women will get pregnant in 1 year using this method  - Once it is placed you do not have to do anything else to not get pregnant, like remembering to take a daily pill  - Many women have lighter periods or no periods at all on the hormonal IUD.   - Once removed, the IUD is immediately reversible and you will return to your normal ability to get pregnant  - While there is a theoretical risk of low milk supply when these methods are started right after birth, there is no research to support this theory. The IUD is considered safe for use in breastfeeding mothers  Cons  - Possibility of IUD falling out of  "the uterus   - Occurs in approximately 2-5% of women   - If your IUD is placed immediately after delivery the risk of it falling out is slightly higher at 10-27%  - Hormonal IUD can result in irregular spotting in the first 3-6 months that usually normalizes  - Non-hormonal IUD may cause heavier and more crampy periods in the first few months of use, which tends to improve after the first year of use  - Other side effects of the hormonal IUD include headaches and acne    Birth Control Shot  The birth control shot, brand name Depo Provera, is given every 3 months to prevent pregnancy. This injection can be received before being discharged from the hospital, following the delivery of your child or at any time at your OBGYN office.   Pros  - About 4 out of 100 women will get pregnant in 1 year using this method  - You do not need to remember to take a daily pill but you do need to remember to get the injection at your OBGYN office every 3 months  - While there is a theoretical risk of low milk supply when this birth control are started right after birth, there is no research to support this theory. The birth control shot is considered safe for use in breastfeeding mothers.  Cons  - Most common side effect of the birth control injection is irregular bleeding; this usually normalizes after 1 year of use  - After your last shot, sometimes your ability to get pregnant can be delayed by as long as 1 year  - Weight gain, headaches, and mood changes are side effects that some women may experience  - Decreased bone mineral density can occur with long-term use; however, this is reversible once you stop using the injection and does not increase your lifetime risk of osteoporosis    Progesterone-Only Pills  The \"mini pill\" is a pill that must be taken once daily at the same time each day. This can be started as soon as you go home from the hospital after delivery, or at any time.  Pros  - About 8 out of 100 women will get " pregnant in 1 year using this method  - You have complete control of when to start and stop taking the pill  - Many women have lighter periods or no periods at all while taking this pill  - While there is a theoretical risk of low milk supply when this birth control are started right after birth, there is no research to support this theory. The progesterone only pill is considered safe for use in breastfeeding mothers  Cons  - You must take the pill at the same time every day. If you miss one day, you need another form of pregnancy prevention for at least 7 days  - Some people have irregular, unscheduled bleeding while taking the pill  - Other side effects include headaches and acne    Combined Hormonal Contraceptives (Pill, Patch, Ring)  These methods all contain 2 hormones, estrogen and progesterone. The pill is taken once daily, the patch is worn on the skin and changed once weekly, the ring is placed vaginally and changed once monthly. These methods can be started 4-6 weeks after giving birth.  Pros  - About 8 out of 100 women will get pregnant in 1 year using this method  - You have complete control of when to start and stop using these methods  - Many women have lighter, less painful, regular periods while taking this pill  Cons  - Recommend against use while breastfeeding as estrogen can decrease milk supply  - You must take the pill at the same time every day. If you miss one day, you need another form of pregnancy prevention for at least 7 days    Male/Female condoms, withdrawal, fertility awareness  These are methods that you either buy over the counter or do yourself. Condoms and withdrawal must be used with every sexual act. Fertility awareness must be assessed every day. Condoms and withdrawal can be used immediately after delivery once your doctor has performed your postpartum office exam. Fertility awareness can be started after return of your period.  Pros  - You have complete control of when to  start and stop using these methods  - No issue with use and breastfeeding  Cons  - About 15-25 out of 100 women will get pregnant in 1 year using this method  - Condoms and withdrawal must be used with every sexual act  - Fertility awareness must be assessed every day

## 2025-06-30 NOTE — PLAN OF CARE
Problem: PAIN - ADULT  Goal: Verbalizes/displays adequate comfort level or baseline comfort level  Description: Interventions:  - Encourage patient to monitor pain and request assistance  - Assess pain using appropriate pain scale  - Administer analgesics as ordered based on type and severity of pain and evaluate response  - Implement non-pharmacological measures as appropriate and evaluate response  - Consider cultural and social influences on pain and pain management  - Notify physician/advanced practitioner if interventions unsuccessful or patient reports new pain  - Educate patient/family on pain management process including their role and importance of  reporting pain   - Provide non-pharmacologic/complimentary pain relief interventions  Outcome: Progressing     Problem: INFECTION - ADULT  Goal: Absence or prevention of progression during hospitalization  Description: INTERVENTIONS:  - Assess and monitor for signs and symptoms of infection  - Monitor lab/diagnostic results  - Monitor all insertion sites, i.e. indwelling lines, tubes, and drains  - Monitor endotracheal if appropriate and nasal secretions for changes in amount and color  - Delaplane appropriate cooling/warming therapies per order  - Administer medications as ordered  - Instruct and encourage patient and family to use good hand hygiene technique  - Identify and instruct in appropriate isolation precautions for identified infection/condition  Outcome: Progressing  Goal: Absence of fever/infection during neutropenic period  Description: INTERVENTIONS:  - Monitor WBC  - Perform strict hand hygiene  - Limit to healthy visitors only  - No plants, dried, fresh or silk flowers with acuna in patient room  Outcome: Progressing     Problem: SAFETY ADULT  Goal: Patient will remain free of falls  Description: INTERVENTIONS:  - Educate patient/family on patient safety including physical limitations  - Instruct patient to call for assistance with activity   -  Consider consulting OT/PT to assist with strengthening/mobility based on AM PAC & JH-HLM score  - Consult OT/PT to assist with strengthening/mobility   - Keep Call bell within reach  - Keep bed low and locked with side rails adjusted as appropriate  - Keep care items and personal belongings within reach  - Initiate and maintain comfort rounds  - Make Fall Risk Sign visible to staff  - Apply yellow socks and bracelet for high fall risk patients  - Consider moving patient to room near nurses station  Outcome: Progressing  Goal: Maintain or return to baseline ADL function  Description: INTERVENTIONS:  -  Assess patient's ability to carry out ADLs; assess patient's baseline for ADL function and identify physical deficits which impact ability to perform ADLs (bathing, care of mouth/teeth, toileting, grooming, dressing, etc.)  - Assess/evaluate cause of self-care deficits   - Assess range of motion  - Assess patient's mobility; develop plan if impaired  - Assess patient's need for assistive devices and provide as appropriate  - Encourage maximum independence but intervene and supervise when necessary  - Involve family in performance of ADLs  - Assess for home care needs following discharge   - Consider OT consult to assist with ADL evaluation and planning for discharge  - Provide patient education as appropriate  - Monitor functional capacity and physical performance, use of AM PAC & JH-HLM   - Monitor gait, balance and fatigue with ambulation    Outcome: Progressing  Goal: Maintains/Returns to pre admission functional level  Description: INTERVENTIONS:  - Perform AM-PAC 6 Click Basic Mobility/ Daily Activity assessment daily.  - Set and communicate daily mobility goal to care team and patient/family/caregiver.   - Collaborate with rehabilitation services on mobility goals if consulted  - Out of bed for toileting  - Record patient progress and toleration of activity level   Outcome: Progressing     Problem: DISCHARGE  PLANNING  Goal: Discharge to home or other facility with appropriate resources  Description: INTERVENTIONS:  - Identify barriers to discharge w/patient and caregiver  - Arrange for needed discharge resources and transportation as appropriate  - Identify discharge learning needs (meds, wound care, etc.)  - Arrange for interpretive services to assist at discharge as needed  - Refer to Case Management Department for coordinating discharge planning if the patient needs post-hospital services based on physician/advanced practitioner order or complex needs related to functional status, cognitive ability, or social support system  Outcome: Progressing     Problem: Knowledge Deficit  Goal: Patient/family/caregiver demonstrates understanding of disease process, treatment plan, medications, and discharge instructions  Description: Complete learning assessment and assess knowledge base.  Interventions:  - Provide teaching at level of understanding  - Provide teaching via preferred learning methods  Outcome: Progressing

## 2025-07-01 ENCOUNTER — TELEPHONE (OUTPATIENT)
Dept: OBGYN CLINIC | Facility: MEDICAL CENTER | Age: 30
End: 2025-07-01

## 2025-07-01 NOTE — UTILIZATION REVIEW
"NOTIFICATION OF INPATIENT ADMISSION   MATERNITY/DELIVERY AUTHORIZATION REQUEST   SERVICING FACILITY:   [facility]  Parent Child Health - L&D, Ruth, NICU  [address] [place] [address]  Tax ID: [id number]  NPI: [id number] ATTENDING PROVIDER:  Attending Name and NPI#: Yumiko Wilde Do [[id number]]  Address: [address] [place] [address]  Phone: [phone]     ADMISSION INFORMATION:  Place of Service: Inpatient [facility]  Place of Service Code: 21  Inpatient Admission Date/Time: 25  6:25 PM  Discharge Date/Time: 2025  2:22 PM  Admitting Diagnosis Code/Description:  Abdominal pain affecting pregnancy [O26.899, R10.9]   Mother: Skyla Loving 1997 Estimated Date of Delivery: 25  Delivering clinician: Yumiko Wilde   OB History          7    Para   4    Term   4       0    AB   3    Living   4         SAB   3    IAB   0    Ectopic   0    Multiple   0    Live Births   4                Name & MRN:   Information for the patient's :  Leydi, Richmondkoby Muniz [[id number]]   Ruth Delivery Information:  Sex: male  Delivered 2025 11:33 PM by Vaginal, Spontaneous; Gestational Age: 37w6d    Ruth Measurements:  Weight: 7 lb 11.8 oz (3510 g);  Height: 20\"    APGAR 1 minute 5 minutes 10 minutes   Totals: 8 9       UTILIZATION REVIEW CONTACT:  Maritza Paige, Utilization   Network Utilization Review Department  Phone: [phone]  Fax [phone]  Email: [email or web address]  Contact for approvals/pending authorizations, clinical reviews, and discharge.   PHYSICIAN ADVISORY SERVICES:  Medical Necessity Denial & Ajpn-jh-Vwdk Review  Phone: [phone]  Fax: [phone]  Email: [email or web address]   DISCHARGE SUPPORT TEAM:  For Patients Discharge Needs & Updates  Phone: [phone] opt. 2 Fax: [phone]  Email: [email or web address]      "

## 2025-07-01 NOTE — TELEPHONE ENCOUNTER
OB nurse navigator attempted to get in contact with patient for post partum assessment call but unable to complete call at this time. Patient unavailable. Unable to leave message VM is full. [facility] message sent.    Please assist patient with scheduling 3-6 week postpartum visit.  .

## 2025-07-02 ENCOUNTER — HOSPITAL ENCOUNTER (OUTPATIENT)
Dept: INFUSION CENTER | Facility: HOSPITAL | Age: 30
Discharge: HOME | End: 2025-07-02
Attending: OBSTETRICS & GYNECOLOGY

## 2025-07-07 LAB — PLACENTA IN STORAGE: NORMAL

## 2025-07-10 ENCOUNTER — HOSPITAL ENCOUNTER (OUTPATIENT)
Dept: INFUSION CENTER | Facility: HOSPITAL | Age: 30
End: 2025-07-10
Attending: OBSTETRICS & GYNECOLOGY